# Patient Record
Sex: FEMALE | Race: WHITE | HISPANIC OR LATINO | Employment: OTHER | URBAN - METROPOLITAN AREA
[De-identification: names, ages, dates, MRNs, and addresses within clinical notes are randomized per-mention and may not be internally consistent; named-entity substitution may affect disease eponyms.]

---

## 2017-08-23 ENCOUNTER — HOSPITAL ENCOUNTER (EMERGENCY)
Facility: HOSPITAL | Age: 42
Discharge: HOME | End: 2017-08-24
Attending: EMERGENCY MEDICINE | Admitting: EMERGENCY MEDICINE

## 2017-08-23 DIAGNOSIS — M79.18 MUSCULOSKELETAL PAIN: Primary | ICD-10-CM

## 2017-08-23 DIAGNOSIS — R51.9 OCCIPITAL HEADACHE: ICD-10-CM

## 2017-08-23 PROCEDURE — 96372 THER/PROPH/DIAG INJ SC/IM: CPT

## 2017-08-23 RX ORDER — KETOROLAC TROMETHAMINE 30 MG/ML
30 INJECTION, SOLUTION INTRAMUSCULAR; INTRAVENOUS ONCE
Status: COMPLETED | OUTPATIENT
Start: 2017-08-23 | End: 2017-08-23

## 2017-08-23 RX ORDER — METHOCARBAMOL 500 MG/1
1000 TABLET, FILM COATED ORAL ONCE
Status: COMPLETED | OUTPATIENT
Start: 2017-08-23 | End: 2017-08-23

## 2017-08-23 RX ORDER — NAPROXEN 500 MG/1
500 TABLET ORAL 2 TIMES DAILY PRN
Qty: 20 TABLET | Refills: 0 | Status: SHIPPED | OUTPATIENT
Start: 2017-08-23 | End: 2017-11-02

## 2017-08-23 RX ORDER — METHOCARBAMOL 500 MG/1
1000 TABLET, FILM COATED ORAL EVERY 8 HOURS PRN
Qty: 30 TABLET | Refills: 0 | Status: SHIPPED | OUTPATIENT
Start: 2017-08-23 | End: 2017-11-02

## 2017-08-23 RX ADMIN — KETOROLAC TROMETHAMINE 30 MG: 30 INJECTION, SOLUTION INTRAMUSCULAR at 23:40

## 2017-08-23 RX ADMIN — METHOCARBAMOL 1000 MG: 500 TABLET ORAL at 23:38

## 2017-08-24 VITALS
SYSTOLIC BLOOD PRESSURE: 115 MMHG | TEMPERATURE: 97.9 F | OXYGEN SATURATION: 98 % | DIASTOLIC BLOOD PRESSURE: 62 MMHG | RESPIRATION RATE: 16 BRPM | HEART RATE: 70 BPM

## 2017-08-24 PROCEDURE — 99283 EMERGENCY DEPT VISIT LOW MDM: CPT

## 2017-09-07 ENCOUNTER — GENERIC CONVERSION - ENCOUNTER (OUTPATIENT)
Dept: OTHER | Facility: OTHER | Age: 42
End: 2017-09-07

## 2017-09-07 DIAGNOSIS — R53.83 OTHER FATIGUE: ICD-10-CM

## 2017-09-07 DIAGNOSIS — R51 HEADACHE(784.0): ICD-10-CM

## 2017-09-08 ENCOUNTER — TRANSCRIBE ORDERS (OUTPATIENT)
Dept: LAB | Facility: CLINIC | Age: 42
End: 2017-09-08

## 2017-09-08 ENCOUNTER — APPOINTMENT (OUTPATIENT)
Dept: LAB | Facility: CLINIC | Age: 42
End: 2017-09-08

## 2017-09-08 DIAGNOSIS — R51 HEADACHE(784.0): ICD-10-CM

## 2017-09-08 DIAGNOSIS — R53.83 OTHER FATIGUE: ICD-10-CM

## 2017-09-08 LAB
ALBUMIN SERPL BCP-MCNC: 3.6 G/DL (ref 3.5–5)
ALP SERPL-CCNC: 69 U/L (ref 46–116)
ALT SERPL W P-5'-P-CCNC: 19 U/L (ref 12–78)
ANION GAP SERPL CALCULATED.3IONS-SCNC: 7 MMOL/L (ref 4–13)
AST SERPL W P-5'-P-CCNC: 15 U/L (ref 5–45)
BASOPHILS # BLD AUTO: 0.03 THOUSANDS/ΜL (ref 0–0.1)
BASOPHILS NFR BLD AUTO: 1 % (ref 0–1)
BILIRUB SERPL-MCNC: 0.28 MG/DL (ref 0.2–1)
BUN SERPL-MCNC: 15 MG/DL (ref 5–25)
CALCIUM SERPL-MCNC: 8.7 MG/DL (ref 8.3–10.1)
CHLORIDE SERPL-SCNC: 106 MMOL/L (ref 100–108)
CO2 SERPL-SCNC: 27 MMOL/L (ref 21–32)
CREAT SERPL-MCNC: 0.65 MG/DL (ref 0.6–1.3)
EOSINOPHIL # BLD AUTO: 0.08 THOUSAND/ΜL (ref 0–0.61)
EOSINOPHIL NFR BLD AUTO: 1 % (ref 0–6)
ERYTHROCYTE [DISTWIDTH] IN BLOOD BY AUTOMATED COUNT: 12.7 % (ref 11.6–15.1)
GFR SERPL CREATININE-BSD FRML MDRD: 110 ML/MIN/1.73SQ M
GLUCOSE P FAST SERPL-MCNC: 89 MG/DL (ref 65–99)
HCT VFR BLD AUTO: 37 % (ref 34.8–46.1)
HGB BLD-MCNC: 12.3 G/DL (ref 11.5–15.4)
LYMPHOCYTES # BLD AUTO: 2.43 THOUSANDS/ΜL (ref 0.6–4.47)
LYMPHOCYTES NFR BLD AUTO: 37 % (ref 14–44)
MCH RBC QN AUTO: 32.8 PG (ref 26.8–34.3)
MCHC RBC AUTO-ENTMCNC: 33.2 G/DL (ref 31.4–37.4)
MCV RBC AUTO: 99 FL (ref 82–98)
MONOCYTES # BLD AUTO: 0.38 THOUSAND/ΜL (ref 0.17–1.22)
MONOCYTES NFR BLD AUTO: 6 % (ref 4–12)
NEUTROPHILS # BLD AUTO: 3.63 THOUSANDS/ΜL (ref 1.85–7.62)
NEUTS SEG NFR BLD AUTO: 55 % (ref 43–75)
NRBC BLD AUTO-RTO: 0 /100 WBCS
PLATELET # BLD AUTO: 373 THOUSANDS/UL (ref 149–390)
PMV BLD AUTO: 10.1 FL (ref 8.9–12.7)
POTASSIUM SERPL-SCNC: 3.8 MMOL/L (ref 3.5–5.3)
PROT SERPL-MCNC: 6.7 G/DL (ref 6.4–8.2)
RBC # BLD AUTO: 3.75 MILLION/UL (ref 3.81–5.12)
SODIUM SERPL-SCNC: 140 MMOL/L (ref 136–145)
TSH SERPL DL<=0.05 MIU/L-ACNC: 2.12 UIU/ML (ref 0.36–3.74)
WBC # BLD AUTO: 6.56 THOUSAND/UL (ref 4.31–10.16)

## 2017-09-08 PROCEDURE — 36415 COLL VENOUS BLD VENIPUNCTURE: CPT

## 2017-09-08 PROCEDURE — 80053 COMPREHEN METABOLIC PANEL: CPT

## 2017-09-08 PROCEDURE — 85025 COMPLETE CBC W/AUTO DIFF WBC: CPT

## 2017-09-08 PROCEDURE — 84443 ASSAY THYROID STIM HORMONE: CPT

## 2017-09-11 ENCOUNTER — GENERIC CONVERSION - ENCOUNTER (OUTPATIENT)
Dept: OTHER | Facility: OTHER | Age: 42
End: 2017-09-11

## 2017-10-17 ENCOUNTER — ALLSCRIPTS OFFICE VISIT (OUTPATIENT)
Dept: OTHER | Facility: OTHER | Age: 42
End: 2017-10-17

## 2017-10-17 DIAGNOSIS — Z12.31 ENCOUNTER FOR SCREENING MAMMOGRAM FOR MALIGNANT NEOPLASM OF BREAST: ICD-10-CM

## 2017-10-21 NOTE — PROGRESS NOTES
Assessment  1  Encounter for gynecological examination () (Z01 419)   2  Encounter for screening mammogram for breast cancer (V7 12) (Z12 31)    Discussion/Summary  healthy adult female Currently, she eats an adequate diet and has an adequate exercise regimen  the risks and benefits of cervical cancer screening were discussed cervical cancer screening is current next cervical cancer screening is due  Breast cancer screening: the risks and benefits of breast cancer screening were discussed, self breast exam technique was taught, monthly self breast exam was advised and mammogram has been ordered  Colorectal cancer screening: colorectal cancer screening is not indicated  Osteoporosis screening: bone mineral density testing is not indicated  Advice and education were given regarding nutrition, aerobic exercise, weight bearing exercise, reproductive health, contraception and fall risk reduction  44 y/o  here for annual exam Annual well woman exam   - Last pap 3/2015 resulted NILM and HR HPV negative   Next pap due   - Breast exam WNL   - MMG ordered Maintenance   - Declines flu vaccine today 1 year or sooner as needed  The patient has the current Goals: Annual exam  The patent has the current Barriers: None  Patient is able to Self-Care  Chief Complaint  Patient here for annual check up      History of Present Illness  HPI: 44 y/o  here for annual exam  Last pap 3/2015 resulting NILM and HR HPV negative  Next due 3/2020  Last mammogram 2016 with additional views of right breast that resulted an enlarged lymph node  Due for repeat screening today  LMP 10/2/17 occurs every 28-30 days lasting 5 days  Not sexually active plans to use condoms  GYN HM, Adult Female [facility] The patient is being seen for a gynecology evaluation  The last health maintenance visit was 1 year(s) ago  Social History: Household members include 1 son(s)  She is unmarried   Work status: not currently employed  The patient has never smoked cigarettes  She reports never drinking alcohol  She has never used illicit drugs  General Health: The patient's health since the last visit is described as good  She has regular dental visits  -- She denies vision problems  Vision care includes no need for vision correction-- and-- having regular eye examinations  -- She denies hearing loss  Immunizations status: up to date  Lifestyle:  She does not have a healthy diet  -- She does not have any weight concerns  -- She exercises regularly  She exercises 7 times per week  Exercise includes strength training -- She does not use tobacco -- She denies alcohol use  -- She denies drug use  Reproductive health: the patient is perimenopausal--   she reports no menstrual problems  Menstrual history:  age at menarche was 15  LMP: the last menstrual period was 10/2/17  Recent menstrual periods: bleeding has been normal  The cycles have been regular-- and-- occur approximately every 28-30 days  The duration of her recent periods has been regular-- and-- usually last 5 days  -- she uses no contraception  -- she is not sexually active  -- pregnancy history: G 2P 2,-- 2    Screening: cancer screening reviewed and current  metabolic screening reviewed and current  risk screening reviewed and current  Review of Systems  no pelvic pain,-- no pelvic pressure,-- no vaginal pain,-- no vaginal discharge-- and-- no dysuria  Constitutional: No fever, no chills, feels well, no tiredness, no recent weight gain or loss  ENT: no ear ache, no loss of hearing, no nosebleeds or nasal discharge, no sore throat or hoarseness  Cardiovascular: no complaints of slow or fast heart rate, no chest pain, no palpitations, no leg claudication or lower extremity edema  Respiratory: no complaints of shortness of breath, no wheezing, no dyspnea on exertion, no orthopnea or PND  Breasts: no complaints of breast pain, breast lump or nipple discharge  Gastrointestinal: no complaints of abdominal pain, no constipation, no nausea or diarrhea, no vomiting, no bloody stools  Genitourinary: no complaints of dysuria, no incontinence, no pelvic pain, no dysmenorrhea, no vaginal discharge or abnormal vaginal bleeding  Musculoskeletal: no complaints of arthralgia, no myalgia, no joint swelling or stiffness, no limb pain or swelling  Integumentary: no complaints of skin rash or lesion, no itching or dry skin, no skin wounds  Neurological: no complaints of headache, no confusion, no numbness or tingling, no dizziness or fainting  Over the past 2 weeks, how often have you been bothered by the following problems? 1 ) Little interest or pleasure in doing things? Not at all    2 ) Feeling down, depressed or hopeless? Not at all    3 ) Trouble falling asleep or sleeping too much? Not at all    4 ) Feeling tired or having little energy? Not at all    5 ) Poor appetite or overeating? Not at all    6 ) Feeling bad about yourself, or that you are a failure, or have let yourself or your family down? Not at all    7 ) Trouble concentrating on things, such as reading a newspaper or watching television? Not at all    8 ) Moving or speaking so slowly that other people could have noticed, or the opposite, moving or speaking faster than usual? Not at all    9 ) Thoughts that you would be better off dead or of hurting yourself in some way? Not at all  Score 0     ROS reviewed  OB History  Pregnancy History (Brief):   Prior pregnancies: : 2  Para: 2 (full-term)-- and-- 2 (living)   Delivery type: 1 vaginal,-- 1  section       Active Problems  1  Encounter for gynecological examination (V72 31) (Z01 419)   2  Encounter for screening mammogram for breast cancer (V76 12) (Z12 31)   3  Fatigue (780 79) (R53 83)   4  Headache (784 0) (R51)   5  Influenza vaccine needed (V04 81) (Z23)   6  Nausea with vomiting (787 01) (R11 2)   7   Vitamin D deficiency (268 9) (E55 9)    Past Medical History   · History of Depression (311) (F32 9)   · History of Varicose Veins Of Lower Extremities (454 9)    The active problems and past medical history were reviewed and updated today  Surgical History   · History of  Section   · History of Hernia Repair    The surgical history was reviewed and updated today  Family History  Mother    · Denied: Family history of substance abuse   · No family history of mental disorder  Father    · Denied: Family history of substance abuse   · No family history of mental disorder  Family History    · Family history of Diabetes Mellitus (V18 0)   · Family history of Pure Hypercholesterolemia    The family history was reviewed and updated today  Social History   · Denied: History of Drug use   · Never A Smoker   · Never Drank Alcohol   · No preference on Protestant beliefs  The social history was reviewed and updated today  Current Meds   1  No Reported Medications Recorded    Allergies  1  No Known Drug Allergies    Vitals   Recorded: [id number] 09:43AM   Heart Rate 68   Systolic 026, LUE, Sitting   Diastolic 71, LUE, Sitting   Height 5 ft 3 in   Weight 157 lb    BMI Calculated 27 81   BSA Calculated 1 74   LMP [id number]   Pain Scale 0     Physical Exam    Constitutional   General appearance: No acute distress, well appearing and well nourished  Neck   Neck: Normal, supple, trachea midline, no masses  Thyroid: Normal, no thyromegaly  Pulmonary   Respiratory effort: No increased work of breathing or signs of respiratory distress  Auscultation of lungs: Clear to auscultation  Cardiovascular   Auscultation of heart: Normal rate and rhythm, normal S1 and S2, no murmurs  Peripheral vascular exam: Normal pulses Throughout  Genitourinary   External genitalia: Normal and no lesions appreciated  Vagina: Normal, no lesions or dryness appreciated      Urethral meatus: Normal     Bladder: Normal, soft, non-tender and no prolapse or masses appreciated  Cervix: Normal, no palpable masses  Examination of the cervix revealed no lesions  A Pap smear was not performed  Bimanual exam findings include no cervical motion tenderness  Uterus: Normal, non-tender, not enlarged, and no palpable masses  Adnexa/parametria: Normal, non-tender and no fullness or masses appreciated  Chest   Breasts: Normal and no dimpling or skin changes noted  Abdomen   Abdomen: Normal, non-tender, and no organomegaly noted  Lymphatic   Palpation of lymph nodes in neck, axillae, groin and/or other locations: No lymphadenopathy or masses noted  Skin   Skin and subcutaneous tissue: Normal skin turgor and no rashes  Psychiatric   Orientation to person, place, and time: Normal     Mood and affect: Normal        Results/Data  PHQ-9 Adult Depression Screening [id number] 09:49AM User, [facility]     Test Name Result Flag Reference   PHQ-9 Adult Depression Score 0     Over the last two weeks, how often have you been bothered by any of the following problems? Little interest or pleasure in doing things: Not at all - 0  Feeling down, depressed, or hopeless: Not at all - 0  Trouble falling or staying asleep, or sleeping too much: Not at all - 0  Feeling tired or having little energy: Not at all - 0  Poor appetite or over eating: Not at all - 0  Feeling bad about yourself - or that you are a failure or have let yourself or your family down: Not at all - 0  Trouble concentrating on things, such as reading the newspaper or watching television: Not at all - 0  Moving or speaking so slowly that other people could have noticed   Or the opposite -  being so fidgety or restless that you have been moving around a lot more than usual: Not at all - 0  Thoughts that you would be better off dead, or of hurting yourself in some way: Not at all - 0   PHQ-9 Adult Depression Screening Negative     PHQ-9 Difficulty Level Not difficult at all     PHQ-9 Severity No Depression Attending Note  Collaborating Physician Note: Collaborating Note: I agree with the Advanced Practitioner note   I discussed the case with the Advanced Practitioner and reviewed the AP note      Signatures   Electronically signed by : Candelario Fagan; Oct 17 2017 10:10AM EST                       (Author)    Electronically signed by : STACI Li ; Oct 20 2017 11:06PM EST                       (Author)

## 2017-11-02 ENCOUNTER — HOSPITAL ENCOUNTER (EMERGENCY)
Facility: HOSPITAL | Age: 42
Discharge: HOME | End: 2017-11-02
Attending: EMERGENCY MEDICINE | Admitting: EMERGENCY MEDICINE

## 2017-11-02 VITALS
TEMPERATURE: 97.5 F | OXYGEN SATURATION: 100 % | WEIGHT: 159.5 LBS | HEART RATE: 66 BPM | RESPIRATION RATE: 16 BRPM | DIASTOLIC BLOOD PRESSURE: 51 MMHG | SYSTOLIC BLOOD PRESSURE: 102 MMHG

## 2017-11-02 DIAGNOSIS — R51.9 HEADACHE: ICD-10-CM

## 2017-11-02 DIAGNOSIS — R42 DIZZINESS: Primary | ICD-10-CM

## 2017-11-02 PROCEDURE — 96375 TX/PRO/DX INJ NEW DRUG ADDON: CPT

## 2017-11-02 PROCEDURE — 96374 THER/PROPH/DIAG INJ IV PUSH: CPT

## 2017-11-02 PROCEDURE — 99284 EMERGENCY DEPT VISIT MOD MDM: CPT

## 2017-11-02 PROCEDURE — 96361 HYDRATE IV INFUSION ADD-ON: CPT

## 2017-11-02 RX ORDER — MECLIZINE HYDROCHLORIDE 25 MG/1
25 TABLET ORAL 3 TIMES DAILY PRN
Qty: 10 TABLET | Refills: 0 | Status: SHIPPED | OUTPATIENT
Start: 2017-11-02 | End: 2019-03-11

## 2017-11-02 RX ORDER — MECLIZINE HCL 12.5 MG/1
25 TABLET ORAL ONCE
Status: COMPLETED | OUTPATIENT
Start: 2017-11-02 | End: 2017-11-02

## 2017-11-02 RX ORDER — KETOROLAC TROMETHAMINE 30 MG/ML
15 INJECTION, SOLUTION INTRAMUSCULAR; INTRAVENOUS ONCE
Status: COMPLETED | OUTPATIENT
Start: 2017-11-02 | End: 2017-11-02

## 2017-11-02 RX ORDER — METOCLOPRAMIDE HYDROCHLORIDE 5 MG/ML
10 INJECTION INTRAMUSCULAR; INTRAVENOUS ONCE
Status: COMPLETED | OUTPATIENT
Start: 2017-11-02 | End: 2017-11-02

## 2017-11-02 RX ADMIN — MECLIZINE HCL 25 MG: 12.5 TABLET ORAL at 10:03

## 2017-11-02 RX ADMIN — KETOROLAC TROMETHAMINE 15 MG: 30 INJECTION, SOLUTION INTRAMUSCULAR at 09:59

## 2017-11-02 RX ADMIN — METOCLOPRAMIDE 10 MG: 5 INJECTION, SOLUTION INTRAMUSCULAR; INTRAVENOUS at 10:01

## 2017-11-02 RX ADMIN — SODIUM CHLORIDE 1000 ML: 0.9 INJECTION, SOLUTION INTRAVENOUS at 09:56

## 2017-11-02 NOTE — ED NOTES
ACE*COMM # [id number] used for translation  Pt reports started to feel dizzy since yesterday  Pt reports "got so dizzy she lost her balance and fell " Pt denies hitting head or LOC  Pt reports continued headache and dizziness since then  Pt reports "feels like the room is spinning" with nausea, vomiting and diarrhea   Pt denies CP and SOB      Isaías Lynn RN  11/02/17 1426

## 2017-11-02 NOTE — ED PROVIDER NOTES
History  Chief Complaint   Patient presents with    Dizziness     Started yesterday  Pt states she's had a headache for "many months "  She reports being seen by her PCP multiple times for it and was told "it's from stress "  Headache is diffuse, "It's in my brain "  Denies focal neuro deficits  History provided by:  Patient   used: Yes ([employer] [id number])    Dizziness   Quality:  Room spinning  Onset quality:  Sudden  Duration:  1 day  Timing:  Intermittent  Chronicity:  Recurrent  Context: head movement    Relieved by:  Being still  Worsened by:  Nothing  Ineffective treatments:  None tried  Associated symptoms: headaches ("for many months"), nausea and vomiting    Associated symptoms: no weakness        None       Past Medical History:   Diagnosis Date    No known health problems        Past Surgical History:   Procedure Laterality Date     SECTION      OVARIAN CYST SURGERY         History reviewed  No pertinent family history  I have reviewed and agree with the history as documented  Social History   Substance Use Topics    Smoking status: Never Smoker    Smokeless tobacco: Never Used    Alcohol use No        Review of Systems   Constitutional: Negative for chills and fever  HENT: Negative for congestion, rhinorrhea, sinus pressure and sore throat  Eyes: Negative for photophobia, pain, redness and visual disturbance  Respiratory: Negative for cough  Gastrointestinal: Positive for nausea and vomiting  Musculoskeletal: Negative for myalgias, neck pain and neck stiffness  Skin: Negative for rash  Neurological: Positive for dizziness and headaches ("for many months")  Negative for facial asymmetry, speech difficulty, weakness, light-headedness and numbness  Psychiatric/Behavioral: Negative for confusion  All other systems reviewed and are negative        Physical Exam  ED Triage Vitals [17 0915]   Temperature Pulse Respirations Blood Pressure SpO2   97 5 °F (36 4 °C) 72 16 118/56 95 %      Temp Source Heart Rate Source Patient Position - Orthostatic VS BP Location FiO2 (%)   Oral -- Sitting Right arm --      Pain Score       8           Orthostatic Vital Signs  Vitals:    11/02/17 0915   BP: 118/56   Pulse: 72   Patient Position - Orthostatic VS: Sitting       Physical Exam   Constitutional: She is oriented to person, place, and time  Vital signs are normal  She appears well-developed and well-nourished  Non-toxic appearance  She does not have a sickly appearance  She does not appear ill  No distress  HENT:   Head: Normocephalic and atraumatic  Right Ear: Tympanic membrane normal    Left Ear: Tympanic membrane normal    Nose: Nose normal    Mouth/Throat: Oropharynx is clear and moist and mucous membranes are normal  No oropharyngeal exudate  Eyes: Conjunctivae and EOM are normal  Pupils are equal, round, and reactive to light  Neck: Normal range of motion and full passive range of motion without pain  Neck supple  No neck rigidity  No Brudzinski's sign and no Kernig's sign noted  Cardiovascular: Normal rate, regular rhythm, normal heart sounds and intact distal pulses  Exam reveals no gallop and no friction rub  No murmur heard  Pulmonary/Chest: Effort normal and breath sounds normal  No respiratory distress  She has no wheezes  She has no rales  Abdominal: Soft  Bowel sounds are normal  She exhibits no distension  There is no tenderness  There is no rigidity, no rebound and no guarding  Lymphadenopathy:     She has no cervical adenopathy  Neurological: She is alert and oriented to person, place, and time  She has normal strength  No cranial nerve deficit or sensory deficit  She exhibits normal muscle tone  Gait normal    Skin: Skin is warm and dry  No ecchymosis, no petechiae and no rash noted  She is not diaphoretic  No pallor  Nursing note and vitals reviewed        ED Medications  Medications   sodium chloride 0 9 % bolus 1,000 mL (0 mL Intravenous Stopped 11/2/17 1042)   ketorolac (TORADOL) 30 mg/mL injection 15 mg (15 mg Intravenous Given 11/2/17 0959)   metoclopramide (REGLAN) injection 10 mg (10 mg Intravenous Given 11/2/17 1001)   meclizine (ANTIVERT) tablet 25 mg (25 mg Oral Given 11/2/17 1003)       Diagnostic Studies  Results Reviewed     None                 No orders to display              Procedures  Procedures       Phone Contacts  ED Phone Contact    ED Course  ED Course as of Nov 02 1102   u Nov 02, 2017   1100 Pt states she feels better  MDM  Number of Diagnoses or Management Options  Dizziness:   Headache:   Diagnosis management comments: Headache/Dizziness without focal deficits - Will give symptomatic tx  CritCare Time    Disposition  Final diagnoses:   Dizziness   Headache     Time reflects when diagnosis was documented in both MDM as applicable and the Disposition within this note     Time User Action Codes Description Comment    11/2/2017 11:01 AM Mindy Cagle [R42] Dizziness     11/2/2017 11:01 AM Mindy Cagle [R51] Headache       ED Disposition     ED Disposition Condition Comment    Discharge  [facility] discharge to home/self care  Condition at discharge: Good        Follow-up Information     Follow up With Specialties Details Why Contact Info    Tram Thompson PA-C Family Medicine Schedule an appointment as soon as possible for a visit  [address]          Patient's Medications   Discharge Prescriptions    MECLIZINE (ANTIVERT) 25 MG TABLET    Take 1 tablet by mouth 3 (three) times a day as needed for dizziness       Start Date: 11/2/2017 End Date: --       Order Dose: 25 mg       Quantity: 10 tablet    Refills: 0     No discharge procedures on file      ED Provider  Electronically Signed by           Staci Mcconnell 24, DO  11/02/17 1102

## 2017-11-02 NOTE — DISCHARGE INSTRUCTIONS
Mareos   LO QUE NECESITA SABER:   El mareo es la sensación de falta de equilibrio o inestabilidad  Las causas comunes del mareo son un desequilibrio del líquido del oído interno o la falta de oxígeno en treadwell she  [employer] ser [employer] (durar 3 días o menos) o crónicos (durar más de 3 días)  Usted puede llegar a tener episodios de [employer] que javed de segundos a unas horas  INSTRUCCIONES SOBRE EL DINA HOSPITALARIA:   Regrese a la mary lou de emergencias si:   · Usted tiene dolor de [place] y rigidez en el thao  · Usted tiene escalofríos con temblores y [place]  · Usted vomita amira y [place] vez sin [place]  · Treadwell vómito o deposiciones están pate o negras  · Usted tiene dolor en el pecho, espalda o abdomen  · Usted tiene adormecimiento, sobre todo en la ahri, brazos o piernas  · Usted tiene dificultad para  los brazos o las piernas  · Usted está confundido  Pregúntele a treadwell Anabelle Byes vitaminas y minerales son adecuados para usted  · Usted tiene fiebre  · Ashley síntomas no mejoran con el tratamiento  · Usted tiene preguntas o inquietudes acerca de treadwell condición o cuidado  [place] de treadwell síntomas:   · No maneje   u opere maquinaria pesada cuando esté mareado  · [place] lentamente  cuando esté sentado o acostado  · [employer] suficiente líquidos  Los líquidos ayudan a evitar la deshidratación  Pregunte cuánto líquido debe nahun cada día y cuáles líquidos son los más adecuados para usted  Acuda a ashley consultas de control con treadwell médico según le indicaron  Anote ashley preguntas para que se acuerde de hacerlas linda ashley visitas  © 2017 [address] Information is for End User's use only and may not be sold, redistributed or otherwise used for commercial purposes  All illustrations and images included in CareNotes® are the copyrighted property of A D A M , Inc  or [place]  Esta información es sólo para uso en educación   Treadwell intención no es darle un consejo médico sobre enfermedades o tratamientos  Colsulte con arechiga Khloe Luis Ers farmacéutico antes de seguir cualquier régimen médico para saber si es seguro y efectivo para usted

## 2018-01-13 VITALS
DIASTOLIC BLOOD PRESSURE: 71 MMHG | BODY MASS INDEX: 27.82 KG/M2 | HEIGHT: 63 IN | SYSTOLIC BLOOD PRESSURE: 102 MMHG | HEART RATE: 68 BPM | WEIGHT: 157 LBS

## 2018-01-13 NOTE — PROGRESS NOTES
Assessment    1  Headache (784 0) (R51)    Plan  Influenza vaccine needed    · Stop: Fluarix    Discussion/Summary    Lab results were reviewed with patient and essentially normal with a slightly elevated HgA1C  Recommended that she review her carbohydrate intake and read food labels  She was encouraged to exercise  She was encouraged to resume taking vitamin D3 supplement  She was encouraged to check with her pharmacy regarding the cost of the butalbital, prescribed at the last visit, which is likely considerably lower than the sumatriptan and pharmacy only gave her a total cost of $195 for the two medications that she was unable to afford  She was encouraged to have an eye exam   I spoke with our financial counselor, Orquidea Streeter, who confirmed that the patient does have [facility] with [facility]  He contacted the neurology clinic, in conjunction with the patient, regarding her referral to neurology for her headaches  Possible side effects of new medications were reviewed with the patient/guardian today  The treatment plan was reviewed with the patient/guardian  The patient/guardian understands and agrees with the treatment plan   The patient was counseled regarding instructions for management, risk factor reductions, patient and family education, impressions, risks and benefits of treatment options, importance of compliance with treatment  Chief Complaint  F/U blood work results      History of Present Illness  MynewMD  #[id number] used for visit  Patient presents for review of lab results  Reports that she is still having headaches  She denies nausea/vomiting/dizziness  She states that she is not taking vitamin D3 supplements as recommended in 2015  She states that she is not taking oral contraceptives, which made her feel ill  She was unable to purchase medications prescribed at last visit  She has no medical insurance  She takes Excedrin Migraine, which is not helping much with her headaches  She still has blurry vision and pain in her eyes  She has not had an eye exam as recommended at last visit  She was unable to schedule a visit with [facility] neurology clinic because she was told she had to pay up front and she was unable to do that  She states that she has [facility] with [facility]  Review of Systems    Constitutional: no fever and no chills  Eyes: as noted in HPI    ENT: no complaints of earache, no loss of hearing, no nose bleeds, no nasal discharge, no sore throat, no hoarseness  Cardiovascular: no chest pain and no palpitations  Respiratory: No complaints of shortness of breath, no wheezing, no cough, no SOB on exertion, no orthopnea, no PND  Gastrointestinal: No complaints of abdominal pain, no constipation, no nausea or vomiting, no diarrhea, no bloody stools  Musculoskeletal: No complaints of arthralgias, no myalgias, no joint swelling or stiffness, no limb pain or swelling  Integumentary: No complaints of skin rash or lesions, no itching, no skin wounds, no breast pain or lump  Neurological: as noted in HPI  Psychiatric: Not suicidal, no sleep disturbance, no anxiety or depression, no change in personality, no emotional problems  Endocrine: No complaints of proptosis, no hot flashes, no muscle weakness, no deepening of the voice, no feelings of weakness  Hematologic/Lymphatic: No complaints of swollen glands, no swollen glands in the neck, does not bleed easily, does not bruise easily  Active Problems    1  Headache (784 0) (R51)   2  Vitamin D deficiency (268 9) (E55 9)    Past Medical History    1  History of Depression (311) (F32 9)   2  History of Varicose Veins Of Lower Extremities (454 9)    The active problems and past medical history were reviewed and updated today  Surgical History    1  History of  Section   2  History of Hernia Repair    The surgical history was reviewed and updated today  Family History    1   Denied: Family history of substance abuse   2  No family history of mental disorder    3  Denied: Family history of substance abuse   4  No family history of mental disorder    5  Family history of Diabetes Mellitus (V18 0)   6  Family history of Hypercholesterolemia    The family history was reviewed and updated today  Social History    · Denied: History of Drug use   · Never A Smoker   · Never Drank Alcohol   · No preference on Rastafarian beliefs  The social history was reviewed and updated today  Current Meds   1  Butalbital-APAP-Caffeine -40 MG Oral Capsule; 1-2 tablets every 4-6 hours as   needed for severe headache  Maximum 8 in a week; Therapy: 60VKE5202 to (Last Rx:30Xgo4110)  Requested for: 61RWD6167 Ordered   2  SUMAtriptan Succinate 50 MG Oral Tablet; TAKE 1 TABLET FOR MIGRAINE RELIEF  MAY   REPEAT EVERY 2 HOURS  MAX 200MG/DAY (no mas que cuatro in [facility]); Therapy: 47WON9223 to (Evaluate:38Lcu6785)  Requested for: 87XSD3743; Last   Rx:56Huq4078 Ordered   3  Vitamin D3 5000 UNIT Oral Tablet; TAKE 1 TABLET DAILY AS DIRECTED; Therapy: 80JNM2388 to [phone])  Requested for: 36AED0770; Last   Rx:28Lag2713 Ordered    The medication list was reviewed and updated today  Allergies    1  No Known Drug Allergies    Vitals  Vital Signs [Data Includes: Current Encounter]    Recorded: 39ZFA9377 08:46AM   Temperature 98 1 F   Heart Rate 86   Respiration 18   Systolic 643   Diastolic 64   Height 5 ft 3 in   Weight 156 lb    BMI Calculated 27 63   BSA Calculated 1 74   LMP [id number]     Physical Exam    Constitutional   General appearance: No acute distress, well appearing and well nourished  Eyes   Conjunctiva and lids: Abnormal   Conjunctiva slightly injected bilaterally with no drainage or tearing noted  Pupils and irises: Equal, round and reactive to light  EOMs intact     Ears, Nose, Mouth, and Throat   External inspection of ears and nose: Normal     Otoscopic examination: Tympanic membranes translucent with normal light reflex  Canals patent without erythema  Nasal mucosa, septum, and turbinates: Normal without edema or erythema  Oropharynx: Normal with no erythema, edema, exudate or lesions  Pulmonary   Respiratory effort: No increased work of breathing or signs of respiratory distress  Auscultation of lungs: Clear to auscultation  Cardiovascular   Auscultation of heart: Normal rate and rhythm, normal S1 and S2, without murmurs  Musculoskeletal   Gait and station: Normal     Skin   Skin and subcutaneous tissue: Normal without rashes or lesions  Neurologic   Reflexes: 2+ and symmetric  Sensation: No sensory loss  Psychiatric   Orientation to person, place, and time: Normal     Mood and affect: Normal          Results/Data  Results   (1) CBC/PLT/DIFF 08ZFH2403 10:47AM Winter Pinto Order Number: [id number]    TW Order Number: [id number]     Test Name Result Flag Reference   WBC COUNT 9 42 Thousand/uL  4 31-10 16   RBC COUNT 4 09 Million/uL  3 81-5 12   HEMOGLOBIN 13 3 g/dL  11 5-15 4   HEMATOCRIT 40 1 %  34 8-46  1   MCV 98 fL  82-98   MCH 32 5 pg  26 8-34 3   MCHC 33 2 g/dL  31 4-37 4   RDW 12 4 %  11 6-15 1   MPV 10 2 fL  8 9-12 7   PLATELET COUNT 277 Thousands/uL  149-390   nRBC AUTOMATED 0 /100 WBCs     NEUTROPHILS RELATIVE PERCENT 65 %  43-75   LYMPHOCYTES RELATIVE PERCENT 28 %  14-44   MONOCYTES RELATIVE PERCENT 5 %  4-12   EOSINOPHILS RELATIVE PERCENT 1 %  0-6   BASOPHILS RELATIVE PERCENT 1 %  0-1   NEUTROPHILS ABSOLUTE COUNT 6 22 Thousands/µL  1 85-7 62   LYMPHOCYTES ABSOLUTE COUNT 2 65 Thousands/µL  0 60-4 47   MONOCYTES ABSOLUTE COUNT 0 43 Thousand/µL  0 17-1 22   EOSINOPHILS ABSOLUTE COUNT 0 05 Thousand/µL  0 00-0 61   BASOPHILS ABSOLUTE COUNT 0 05 Thousands/µL  0 00-0 10     (1) COMPREHENSIVE METABOLIC PANEL [id number] 14:15OD Юлия Thomas    Order Number: [id number]      National Kidney Disease Education Program recommendations are as follows:  GFR calculation is accurate only with a steady state creatinine  Chronic Kidney disease less than 60 ml/min/1 73 sq  meters  Kidney failure less than 15 ml/min/1 73 sq  meters  Test Name Result Flag Reference   GLUCOSE,RANDM 92 mg/dL     If the patient is fasting, the ADA then defines impaired fasting glucose as > 100 mg/dL and diabetes as > or equal to 123 mg/dL  SODIUM 138 mmol/L  136-145   POTASSIUM 4 0 mmol/L  3 5-5 3   CHLORIDE 105 mmol/L  100-108   CARBON DIOXIDE 27 mmol/L  21-32   ANION GAP (CALC) 6 mmol/L  4-13   BLOOD UREA NITROGEN 10 mg/dL  5-25   CREATININE 0 58 mg/dL L 0 60-1 30   CALCIUM 8 7 mg/dL  8 3-10 1   BILI, TOTAL 0 44 mg/dL  0 20-1 00   ALK PHOSPHATAS 85 U/L     ALT (SGPT) 25 U/L  12-78   AST(SGOT) 15 U/L  5-45   ALBUMIN 4 0 g/dL  3 5-5 0   TOTAL PROTEIN 6 9 g/dL  6 4-8 2   eGFR Non-African American      >60 0 ml/min/1 73sq m     (1) HEMOGLOBIN A1C [id number] 10:47AM Cheryl MCKEON Order Number: [id number]      5 7-6 4% impaired fasting glucose  >=6 5% diagnosis of diabetes    Falsely low levels are seen in conditions linked to short RBC life span-  hemolytic anemia, and splenomegaly  Falsely elevated levels are seen in situations where there is an increased production of RBC- receipt of erythropoietin or blood transfusions  Adopted from ADA-Clinical Practice Recommendations     Test Name Result Flag Reference   HEMOGLOBIN A1C 5 7 % H 4 0-5 6   EST  AVG   GLUCOSE 117 mg/dl       Signatures   Electronically signed by : Teresa Harmon; Mar  5 2016 12:48PM EST                       (Author)    Electronically signed by : STACI Lilly ; Mar  7 2016  1:08PM EST

## 2018-01-18 NOTE — RESULT NOTES
Verified Results  (1) CBC/PLT/DIFF 60Oxe4975 08:08AM Rishabh Virk Order Number: [id number]     Test Name Result Flag Reference   WBC COUNT 6 56 Thousand/uL  4 31-10 16   RBC COUNT 3 75 Million/uL L 3 81-5 12   HEMOGLOBIN 12 3 g/dL  11 5-15 4   HEMATOCRIT 37 0 %  34 8-46  1   MCV 99 fL H 82-98   MCH 32 8 pg  26 8-34 3   MCHC 33 2 g/dL  31 4-37 4   RDW 12 7 %  11 6-15 1   MPV 10 1 fL  8 9-12 7   PLATELET COUNT 744 Thousands/uL  149-390   nRBC AUTOMATED 0 /100 WBCs     NEUTROPHILS RELATIVE PERCENT 55 %  43-75   LYMPHOCYTES RELATIVE PERCENT 37 %  14-44   MONOCYTES RELATIVE PERCENT 6 %  4-12   EOSINOPHILS RELATIVE PERCENT 1 %  0-6   BASOPHILS RELATIVE PERCENT 1 %  0-1   NEUTROPHILS ABSOLUTE COUNT 3 63 Thousands/? ??L  1 85-7 62   LYMPHOCYTES ABSOLUTE COUNT 2 43 Thousands/? ??L  0 60-4 47   MONOCYTES ABSOLUTE COUNT 0 38 Thousand/? ??L  0 17-1 22   EOSINOPHILS ABSOLUTE COUNT 0 08 Thousand/? ??L  0 00-0 61   BASOPHILS ABSOLUTE COUNT 0 03 Thousands/? ??L  0 00-0 10     (1) COMPREHENSIVE METABOLIC PANEL 63WOV9455 55:70RG Rishabh Virk Order Number: [id number]     Test Name Result Flag Reference   SODIUM 140 mmol/L  136-145   POTASSIUM 3 8 mmol/L  3 5-5 3   CHLORIDE 106 mmol/L  100-108   CARBON DIOXIDE 27 mmol/L  21-32   ANION GAP (CALC) 7 mmol/L  4-13   BLOOD UREA NITROGEN 15 mg/dL  5-25   CREATININE 0 65 mg/dL  0 60-1 30   Standardized to IDMS reference method   CALCIUM 8 7 mg/dL  8 3-10 1   BILI, TOTAL 0 28 mg/dL  0 20-1 00   ALK PHOSPHATAS 69 U/L     ALT (SGPT) 19 U/L  12-78   Specimen collection should occur prior to Sulfasalazine and/or Sulfapyridine administration due to the potential for falsely depressed results  AST(SGOT) 15 U/L  5-45   Specimen collection should occur prior to Sulfasalazine administration due to the potential for falsely depressed results     ALBUMIN 3 6 g/dL  3 5-5 0   TOTAL PROTEIN 6 7 g/dL  6 4-8 2   eGFR 110 ml/min/1 73sq m     National Kidney Disease Education Program recommendations are as follows:  GFR calculation is accurate only with a steady state creatinine  Chronic Kidney disease less than 60 ml/min/1 73 sq  meters  Kidney failure less than 15 ml/min/1 73 sq  meters  GLUCOSE FASTING 89 mg/dL  65-99   Specimen collection should occur prior to Sulfasalazine administration due to the potential for falsely depressed results  Specimen collection should occur prior to Sulfapyridine administration due to the potential for falsely elevated results  (1) TSH WITH FT4 REFLEX [id number] 08:08AM [facility] Order Number: [id number]     Test Name Result Flag Reference   TSH 2 120 uIU/mL  0 358-3 740   Patients undergoing fluorescein dye angiography may retain small amounts of fluorescein in the body for 48-72 hours post procedure  Samples containing fluorescein can produce falsely depressed TSH values  If the patient had this procedure,a specimen should be resubmitted post fluorescein clearance            The recommended reference ranges for TSH during pregnancy are as follows:  First trimester 0 1 to 2 5 uIU/mL  Second trimester  0 2 to 3 0 uIU/mL  Third trimester 0 3 to 3 0 uIU/m

## 2018-01-22 VITALS
BODY MASS INDEX: 27.82 KG/M2 | TEMPERATURE: 97.5 F | SYSTOLIC BLOOD PRESSURE: 110 MMHG | RESPIRATION RATE: 18 BRPM | OXYGEN SATURATION: 98 % | WEIGHT: 157 LBS | DIASTOLIC BLOOD PRESSURE: 80 MMHG | HEART RATE: 76 BPM | HEIGHT: 63 IN

## 2018-05-19 PROBLEM — R53.83 FATIGUE: Status: ACTIVE | Noted: 2017-09-07

## 2018-05-21 ENCOUNTER — OFFICE VISIT (OUTPATIENT)
Dept: FAMILY MEDICINE CLINIC | Facility: CLINIC | Age: 43
End: 2018-05-21

## 2018-05-21 VITALS
HEIGHT: 61 IN | OXYGEN SATURATION: 98 % | TEMPERATURE: 98.4 F | DIASTOLIC BLOOD PRESSURE: 70 MMHG | RESPIRATION RATE: 18 BRPM | BODY MASS INDEX: 30.4 KG/M2 | SYSTOLIC BLOOD PRESSURE: 110 MMHG | WEIGHT: 161 LBS | HEART RATE: 88 BPM

## 2018-05-21 DIAGNOSIS — R53.83 FATIGUE, UNSPECIFIED TYPE: ICD-10-CM

## 2018-05-21 DIAGNOSIS — M54.2 NECK PAIN: Primary | ICD-10-CM

## 2018-05-21 DIAGNOSIS — R42 VERTIGO: ICD-10-CM

## 2018-05-21 DIAGNOSIS — R51.9 INTRACTABLE HEADACHE, UNSPECIFIED CHRONICITY PATTERN, UNSPECIFIED HEADACHE TYPE: ICD-10-CM

## 2018-05-21 PROCEDURE — 99214 OFFICE O/P EST MOD 30 MIN: CPT | Performed by: PHYSICIAN ASSISTANT

## 2018-05-21 RX ORDER — NAPROXEN 500 MG/1
500 TABLET ORAL 2 TIMES DAILY WITH MEALS
Qty: 60 TABLET | Refills: 1 | Status: SHIPPED | OUTPATIENT
Start: 2018-05-21 | End: 2019-03-11 | Stop reason: SDUPTHER

## 2018-05-21 RX ORDER — CYCLOBENZAPRINE HCL 10 MG
10 TABLET ORAL 3 TIMES DAILY PRN
Qty: 30 TABLET | Refills: 1 | Status: SHIPPED | OUTPATIENT
Start: 2018-05-21 | End: 2019-06-10

## 2018-05-21 NOTE — PROGRESS NOTES
Assessment/Plan:    Patient Instructions   Heat to the cervical spine 3 times daily as needed for 10 min  Take naproxen with food, no alcohol  Avoid cyclobenzaprine of working or driving because the drowsy side effects  Proceed with physical therapy  The importance of doing the exercises at home has also been discussed  Labs from September have been reviewed again with her  CBC, TSH and CMP are normal   Follow-up if there is no improvement with physical therapy in 6-8 weeks or if any symptoms increase  M*Modal software was used to dictate this note  It may contain errors with dictating incorrect words/spelling  Please contact provider directly for any questions  Diagnoses and all orders for this visit:    Neck pain  -     Ambulatory referral to Physical Therapy; Future    Intractable headache, unspecified chronicity pattern, unspecified headache type  -     Ambulatory referral to Physical Therapy; Future    Vertigo  -     Ambulatory referral to Physical Therapy; Future    Fatigue, unspecified type          Subjective:      Patient ID: Giancarlo Barnes is a 43 y o  female  Patient presents today for evaluation of neck pain, pain in the back of her head, intermittent dizziness and fatigue which is been ongoing for a while  She was here in September with complaints of fatigue and headaches and had blood work done at that time including her thyroid, blood count and CMP which was normal at that time  She was not sure what blood work was completed  She gets intermittent dizziness  She states that she was cleaning a when she went to stand up after bending forward she felt like the room was spinning  She states she has been sleeping well because of her neck pain  She does get intermittent tingling in all of her fingers of both hands  She has tried Aleve over-the-counter with minimal relief          The following portions of the patient's history were reviewed and updated as appropriate:   She  has a past medical history of Depression; No known health problems; and Varicose veins of both lower extremities  She   Patient Active Problem List    Diagnosis Date Noted    Neck pain 2018    Vertigo 2018    Fatigue 2017    Vitamin D deficiency 09/15/2014    Headache 2013     She  has a past surgical history that includes  section; Ovarian cyst surgery; and Hernia repair  Her family history includes Diabetes in her other; Hyperlipidemia in her other  She  reports that she has never smoked  She has never used smokeless tobacco  She reports that she does not drink alcohol or use drugs  Current Outpatient Prescriptions   Medication Sig Dispense Refill    meclizine (ANTIVERT) 25 mg tablet Take 1 tablet by mouth 3 (three) times a day as needed for dizziness 10 tablet 0     No current facility-administered medications for this visit  Current Outpatient Prescriptions on File Prior to Visit   Medication Sig    meclizine (ANTIVERT) 25 mg tablet Take 1 tablet by mouth 3 (three) times a day as needed for dizziness     No current facility-administered medications on file prior to visit  She has No Known Allergies       Review of Systems   Constitutional: Positive for fatigue  Respiratory: Negative  Cardiovascular: Negative  Gastrointestinal: Negative  Musculoskeletal:        As stated in HPI   Neurological:        As stated in HPI         Objective:      /70   Pulse 88   Temp 98 4 °F (36 9 °C) (Tympanic)   Resp 18   Ht 5' 1" (1 549 m)   Wt 73 kg (161 lb)   LMP 2018   SpO2 98%   BMI 30 42 kg/m²          Physical Exam   Constitutional: She appears well-developed and well-nourished  No distress  HENT:   Head: Normocephalic and atraumatic  Right Ear: External ear normal    Left Ear: External ear normal    Mouth/Throat: Oropharynx is clear and moist  No oropharyngeal exudate  Neck: Neck supple  No thyromegaly present     Cardiovascular: Normal rate, regular rhythm, normal heart sounds and intact distal pulses  No murmur heard  Pulmonary/Chest: Effort normal and breath sounds normal  No respiratory distress  She has no wheezes  She has no rales  Abdominal: Soft  Bowel sounds are normal  There is no tenderness  Musculoskeletal:   Cervical spine:  She does have diffuse tenderness over the spine and trap region bilaterally  She does have reduced rotation especially to the left  Strength of the upper extremities is 5+/5 and equal bilaterally  Distal neurovascular status is intact  Lymphadenopathy:     She has no cervical adenopathy  Neurological: She is alert  Skin: Skin is warm  Psychiatric: She has a normal mood and affect   Her behavior is normal  Judgment and thought content normal

## 2018-05-21 NOTE — PATIENT INSTRUCTIONS
Heat to the cervical spine 3 times daily as needed for 10 min  Take naproxen with food, no alcohol  Avoid cyclobenzaprine of working or driving because the drowsy side effects  Proceed with physical therapy  The importance of doing the exercises at home has also been discussed  Labs from September have been reviewed again with her  CBC, TSH and CMP are normal   Follow-up if there is no improvement with physical therapy in 6-8 weeks or if any symptoms increase

## 2018-10-02 ENCOUNTER — HOSPITAL ENCOUNTER (EMERGENCY)
Facility: HOSPITAL | Age: 43
Discharge: HOME | End: 2018-10-02
Admitting: EMERGENCY MEDICINE

## 2018-10-02 VITALS
DIASTOLIC BLOOD PRESSURE: 61 MMHG | RESPIRATION RATE: 20 BRPM | OXYGEN SATURATION: 95 % | TEMPERATURE: 98.4 F | HEART RATE: 88 BPM | BODY MASS INDEX: 30.23 KG/M2 | WEIGHT: 160 LBS | SYSTOLIC BLOOD PRESSURE: 125 MMHG

## 2018-10-02 DIAGNOSIS — R09.82 PND (POST-NASAL DRIP): ICD-10-CM

## 2018-10-02 DIAGNOSIS — R05.9 COUGH: Primary | ICD-10-CM

## 2018-10-02 PROCEDURE — 99284 EMERGENCY DEPT VISIT MOD MDM: CPT

## 2018-10-02 PROCEDURE — 93005 ELECTROCARDIOGRAM TRACING: CPT

## 2018-10-03 NOTE — DISCHARGE INSTRUCTIONS
You do not appear to have an infection  Your symptoms appear to be from the weather changes  You have post nasal drip that is causing the scratchy throat  You have muscle pain from coughing  You are to take the claritin tablets and continue robitussin as needed  Use a throat drop  Increase water intake  Follow up with your PCP  Tos aguda   LO QUE NECESITA SABER:   Sherita Gimenez tos aguda puede durar hasta 3 semanas  Las causas comunes de amira tos aguda incluyen un resfriado, alergias o amira infección pulmonar  INSTRUCCIONES SOBRE EL DINA HOSPITALARIA:   Regrese a la mary lou de emergencias si:   · Tiene dificultad para respirar o le falta el aire  · Usted tose she o nota she en arechiga mucosidad  · Se desmaya, se siente débil o mareado  · Le duele el pecho cuando respira hondo o tose  · Tiene respiración silbante  Pregúntele a arechiga Cherylann Bimler vitaminas y minerales son adecuados para usted  · Usted tiene fiebre  · La tos dura más de 4 semanas  · Lea síntomas no mejoran con el tratamiento  · Usted tiene preguntas o inquietudes acerca de arechiga condición o cuidado  Medicamentos:   · Medicamentos,  para detener la tos, disminuir la inflamación de las vías respiratorias o ayudar a abrirlas  Los medicamentos también pueden despejar las vías respiratorias  Pregunte a arechiga médico qué medicamentos de venta jeremías puede nahun  Si tiene amira infección causada por bacterias, es posible que necesite antibióticos  · [place] lea medicamentos kandace se le haya indicado  Consulte con arechiga médico si usted christine que arechiga medicamento no le está ayudando o si presenta efectos secundarios  Infórmele si es alérgico a cualquier medicamento  Mantenga amira lista actualizada de los [place], las vitaminas y los productos herbales que javy  Incluya los siguientes datos de los medicamentos: cantidad, frecuencia y motivo de administración  Traiga con usted la lista o los envases de la píldoras a lea citas de seguimiento   Lleve la lista de los medicamentos con usted en cornelio de amira emergencia  El manejo de treadwell síntomas:   · No fume y permanezca lejos de otras personas que fuman  La nicotina y otros químicos contenidos en los cigarrillos y puros pueden causar daño pulmonar y empeorar treadwell tos  Pida información a treadwell médico si usted actualmente fuma y necesita ayuda para dejar de fumar  Los cigarrillos electrónicos o tabaco sin humo todavía contienen nicotina  Consulte con treadwell médico antes de [employer]  · Mango líquidos adicionales kandace se le indique  Los líquidos le ayudarán a aflojar y disolver la mucosidad para que la pueda expectorar  Los líquidos ayudarán a evitar la deshidratación  Los mejores líquidos para nahun son el agua, el jugo y el caldo  No tome líquidos que contienen cafeína  La cafeína puede aumentar el riesgo de deshidratación  Pregúntele a treadwell médico cuál es la cantidad de líquido que necesita ingerir a diario  · Repose según le indicaron  No realice actividades que empeoren la tos, kandace el ejercicio físico      · Use un humidificador o vaporizador  Use un humidificador de seema frío o un vaporizador para elevar la humedad en treadwell casa  [place] podría ayudarle a respirar más fácilmente y al mismo tiempo disminuir la tos  · Ingiera de 2 a 5 ml de [employer] al día  La miel puede ayudar a diluir los mocos y [employer] tos  · Utilice gotas o pastillas para la tos  Estas pueden ayudar a disminuir la irritación de la garganta y la tos  Acuda a ashley consultas de control con treadwell médico según le indicaron  Anote ashley preguntas para que se acuerde de hacerlas linda ashley visitas  © 2017 [facility] Information is for End User's use only and may not be sold, redistributed or otherwise used for commercial purposes  All illustrations and images included in CareNotes® are the copyrighted property of A D A M , Inc  or Urbano Travis  Esta información es sólo para uso en educación   Treadwell intención no es darle un consejo médico sobre enfermedades o tratamientos  Colsulte con arechiga Galindo Garg farmacéutico antes de seguir cualquier régimen médico para saber si es seguro y efectivo para usted  Goteo retronasal   CUIDADO AMBULATORIO:   El goteo retronasal  es amira condición que causa la acumulación de amira gran cantidad de moco en la garganta o la nariz  También se denomina síndrome de tos asociado a patología de la vía aérea superior debido a que la mucosidad provoca tos repetida  Usted puede tener dolor de garganta o hinchazón de los tejidos de la garganta  Podría sentir kandace que tiene amira protuberancia en la garganta  También puede sentir la necesidad de despejar la garganta a menudo  Comuníquese con arechiga médico si:   · Usted tiene dificultad para tragar debido a la mucosidad  · Usted tiene síntomas nuevos o estos empeoran, incluso después del [place]  · Usted tiene signos de [address], kandace mucosidad CHRISTINE o [place], o [place]  · Usted tiene preguntas o inquietudes acerca de arechiga condición o cuidado  El tratamiento  podría incluir cualquiera de los siguientes:  · Medicamentos,  pueden ser administrados para diluir la mucosidad  Puede que necesite tragar el medicamento o [employer] un dispositivo para limpiar los senos paranasales con un líquido que se arroja a chorros en la nariz  También podría necesitar aerosoles nasales para mantener la humedad de los tejidos en la nariz  Los medicamentos también pueden aliviar la congestión  Los medicamentos para la alergia pueden ayudar si los síntomas son provocados por alergias estacionales, kandace la fiebre del heno  Usted podría necesitar medicamentos para ayudar a controlar la enfermedad por reflujo gastroesofágico     · Antibióticos  podrían ser necesarios para tratar amira infección bacteriana  Controle el goteo retronasal:   · Use un humidificador o vaporizador    Use un humidificador de seema frío o un vaporizador para elevar la humedad en treadwell casa  [place] podría ayudarle a respirar con Carine Marilee  · [employer] líquidos kandace se le indique  Los líquidos ayudan a mantener humectadas ashley vías respiratorias y ayudarle a eliminar las flemas por medio de la tos  Pregunte cuánto líquido debe nahun cada día y cuáles líquidos son los más adecuados para usted  · Evite el aire frío y el aire seco y caliente  El aire frío o el aire seco pueden provocar goteo retronasal  Trate de permanecer dentro en días fríos, o de [employer] boca [place]  No permanezca mucho tiempo en áreas con aire seco y caliente  · No fume y evite el humo de [place]  La nicotina y otros químicos en los cigarrillos y cigarros pueden irritar la garganta y empeorar la tos  Pida información a teradwell médico si usted actualmente fuma y necesita ayuda para dejar de fumar  Los cigarrillos electrónicos o tabaco sin humo todavía contienen nicotina  Consulte con treadwell médico antes de [employer]  Acuda a ashley consultas de control con treadwell médico según le indicaron  Anote ashley preguntas para que se acuerde de hacerlas linda ashley visitas  © 2017 [address] [facility] Information is for End User's use only and may not be sold, redistributed or otherwise used for commercial purposes  All illustrations and images included in CareNotes® are the copyrighted property of A D A M , Inc  or Urbano Travis  Esta información es sólo para uso en educación  Treadwell intención no es darle un consejo médico sobre enfermedades o tratamientos  Colsulte con treadwell Gaylyn Harsh farmacéutico antes de seguir cualquier régimen médico para saber si es seguro y efectivo para usted

## 2018-10-03 NOTE — ED PROVIDER NOTES
History  Chief Complaint   Patient presents with    Chest Pain     patient c/o chest pain and sore throat that started a couple of days ago    Sore Throat     This is a 37year old female who states has had a scratchy throat, cough and chest and abdominal muscle soreness for the last 2 weeks  She states she has been taking tussin for 2 days and tylenol x 1 dose  She states that her mother came from [place] and brought her some amoxicillin, she took 1 dose and stopped  She denies fevers, chills, n/v/d  History provided by:  Patient and medical records   used: No    Chest Pain   Chest pain location: central chest   Pain quality comment:  Soreness   Pain radiates to:  Does not radiate  Pain radiates to the back: no    Pain severity:  Mild  Onset quality:  Gradual  Duration:  1 week  Progression:  Waxing and waning  Chronicity:  New  Context comment:  Coughing   Relieved by:  Nothing  Ineffective treatments: tylenol   Associated symptoms: cough    Sore Throat   Associated symptoms: chest pain and cough        Prior to Admission Medications   Prescriptions Last Dose Informant Patient Reported? Taking?    cyclobenzaprine (FLEXERIL) 10 mg tablet   No No   Sig: Take 1 tablet (10 mg total) by mouth 3 (three) times a day as needed for muscle spasms   meclizine (ANTIVERT) 25 mg tablet   No No   Sig: Take 1 tablet by mouth 3 (three) times a day as needed for dizziness   naproxen (NAPROSYN) 500 mg tablet   No No   Sig: Take 1 tablet (500 mg total) by mouth 2 (two) times a day with meals      Facility-Administered Medications: None       Past Medical History:   Diagnosis Date    Depression     No known health problems     Varicose veins of both lower extremities        Past Surgical History:   Procedure Laterality Date     SECTION      HERNIA REPAIR      OVARIAN CYST SURGERY         Family History   Problem Relation Age of Onset    Diabetes Other     Hyperlipidemia Other pure     I have reviewed and agree with the history as documented  Social History   Substance Use Topics    Smoking status: Never Smoker    Smokeless tobacco: Never Used    Alcohol use No        Review of Systems   Constitutional: Negative  HENT: Positive for sore throat  Eyes: Negative  Respiratory: Positive for cough  Cardiovascular: Positive for chest pain  Gastrointestinal: Negative  Physical Exam  Physical Exam   Constitutional: She is oriented to person, place, and time  She appears well-developed and well-nourished  No distress  HENT:   Head: Normocephalic and atraumatic  Right Ear: External ear normal    Left Ear: External ear normal    Nose: Nose normal    Posterior oropharyngeal + PND with mild redness  Pt has chronic throat clearing cough     Eyes: Pupils are equal, round, and reactive to light  EOM are normal    Neck: Normal range of motion  Neck supple  Cardiovascular: Normal rate, regular rhythm and normal heart sounds  Pulmonary/Chest: Effort normal and breath sounds normal  No respiratory distress  She has no wheezes  She has no rales  She exhibits no tenderness  Abdominal: Soft  Bowel sounds are normal    Musculoskeletal: Normal range of motion  Neurological: She is alert and oriented to person, place, and time  Skin: Skin is warm and dry  Capillary refill takes less than 2 seconds  She is not diaphoretic  Psychiatric: She has a normal mood and affect  Her behavior is normal  Judgment and thought content normal    Nursing note and vitals reviewed        Vital Signs  ED Triage Vitals [10/02/18 2043]   Temperature Pulse Respirations Blood Pressure SpO2   98 4 °F (36 9 °C) 88 20 125/61 95 %      Temp Source Heart Rate Source Patient Position - Orthostatic VS BP Location FiO2 (%)   Oral Monitor Sitting Right arm --      Pain Score       --           Vitals:    10/02/18 2043   BP: 125/61   Pulse: 88   Patient Position - Orthostatic VS: Sitting       Visual Acuity      ED Medications  Medications - No data to display    Diagnostic Studies  Results Reviewed     None                 No orders to display              Procedures  Procedures       Phone Contacts  ED Phone Contact    ED Course                               MDM  Number of Diagnoses or Management Options  Cough:   PND (post-nasal drip):   Diagnosis management comments: PND cough    Continue robitussin, take claritin    Pt verbalizes understanding of d/c instructions and follow up        Amount and/or Complexity of Data Reviewed  Review and summarize past medical records: yes      CritCare Time    Disposition  Final diagnoses:   Cough   PND (post-nasal drip)     Time reflects when diagnosis was documented in both MDM as applicable and the Disposition within this note     Time User Action Codes Description Comment    10/2/2018  9:09 PM Gela Patter Add [R05] Cough     10/2/2018  9:09 PM Gela Keyter Add [R09 82] PND (post-nasal drip)       ED Disposition     ED Disposition Condition Comment    Discharge  [facility] discharge to home/self care      Condition at discharge: Good        Follow-up Information     Follow up With Specialties Details Why Contact Info Additional Information    Tram Thompson PA-C Family Medicine, Physician Assistant Schedule an appointment as soon as possible for a visit in 2 days  [facility]  [facility] [address] [place] [address] Emergency Department Emergency Medicine  If symptoms worsen [facility]  [phone] [address] [place] [address] ED, [address] [place], [place], [address]          Discharge Medication List as of 10/2/2018  9:11 PM      CONTINUE these medications which have NOT CHANGED    Details   cyclobenzaprine (FLEXERIL) 10 mg tablet Take 1 tablet (10 mg total) by mouth 3 (three) times a day as needed for muscle spasms, Starting Mon 5/21/2018, Normal      meclizine (ANTIVERT) 25 mg tablet Take 1 tablet by mouth 3 (three) times a day as needed for dizziness, Starting Thu 11/2/2017, Print      naproxen (NAPROSYN) 500 mg tablet Take 1 tablet (500 mg total) by mouth 2 (two) times a day with meals, Starting Mon 5/21/2018, Normal           No discharge procedures on file      ED Provider  Electronically Signed by           Clinton Brady  10/02/18 8339

## 2018-10-04 LAB
ATRIAL RATE: 81 BPM
P AXIS: 36 DEGREES
PR INTERVAL: 138 MS
QRS AXIS: -6 DEGREES
QRSD INTERVAL: 100 MS
QT INTERVAL: 388 MS
QTC INTERVAL: 450 MS
T WAVE AXIS: 31 DEGREES
VENTRICULAR RATE: 81 BPM

## 2018-10-04 PROCEDURE — 93010 ELECTROCARDIOGRAM REPORT: CPT | Performed by: INTERNAL MEDICINE

## 2019-03-11 ENCOUNTER — OFFICE VISIT (OUTPATIENT)
Dept: FAMILY MEDICINE CLINIC | Facility: CLINIC | Age: 44
End: 2019-03-11

## 2019-03-11 VITALS
OXYGEN SATURATION: 95 % | SYSTOLIC BLOOD PRESSURE: 102 MMHG | HEIGHT: 61 IN | RESPIRATION RATE: 18 BRPM | HEART RATE: 98 BPM | TEMPERATURE: 98.6 F | BODY MASS INDEX: 29.45 KG/M2 | DIASTOLIC BLOOD PRESSURE: 64 MMHG | WEIGHT: 156 LBS

## 2019-03-11 DIAGNOSIS — J01.20 ACUTE NON-RECURRENT ETHMOIDAL SINUSITIS: Primary | ICD-10-CM

## 2019-03-11 DIAGNOSIS — M54.2 NECK PAIN: ICD-10-CM

## 2019-03-11 DIAGNOSIS — R21 RASH: ICD-10-CM

## 2019-03-11 PROCEDURE — 99214 OFFICE O/P EST MOD 30 MIN: CPT | Performed by: PHYSICIAN ASSISTANT

## 2019-03-11 RX ORDER — AMOXICILLIN 500 MG/1
1000 CAPSULE ORAL EVERY 8 HOURS SCHEDULED
Qty: 42 CAPSULE | Refills: 0 | Status: SHIPPED | OUTPATIENT
Start: 2019-03-11 | End: 2019-03-18

## 2019-03-11 RX ORDER — NAPROXEN 500 MG/1
500 TABLET ORAL 2 TIMES DAILY WITH MEALS
Qty: 60 TABLET | Refills: 1 | Status: SHIPPED | OUTPATIENT
Start: 2019-03-11 | End: 2019-06-10

## 2019-03-11 RX ORDER — PREDNISONE 10 MG/1
20 TABLET ORAL DAILY
Qty: 10 TABLET | Refills: 0 | Status: SHIPPED | OUTPATIENT
Start: 2019-03-11 | End: 2019-06-10

## 2019-03-11 NOTE — PATIENT INSTRUCTIONS
Sinusitis   LO QUE NECESITA SABER:   ¿Qué es la sinusitis? La sinusitis es la inflamación o infección de los senos paranasales  La mayoría de las veces es a causa de un virus  La sinusitis aguda podría durar hasta 12 semanas  La sinusitis crónica dura más de 12 semanas  La sinusitis recurrente significa que la padece 4 o más veces en 1 año  ¿Qué aumenta mi riesgo de tener sinusitis? · Las condiciones de enedina, kandace las infecciones de las vías respiratorias superiores, las [place], el asma o la fibrosis quística    · Las infecciones dentales o procedimientos, tales kandace las infecciones de encías, caries o un conducto radicular    · Fumar    · Las estructuras anormales de los senos paranasales, kandace bultos en la nariz, inflamación de las amígdalas o desviación del tabique nasal    · Un sistema inmunológico débil debido a las enfermedades kandace la diabetes o el VIH  ¿Cuáles son los signos y síntomas de la sinusitis? · Vidhi Wellborn o escalofríos    · Dolor, presión, enrojecimiento o inflamación alrededor de la frente, las mejillas o los ojos    · Secreción espesa de color amarillo o verenice que sale de la nariz    · Sensibilidad al tocarse el hernando encima de los senos paranasales    · Tos seca que ocurre mayormente por la noche o al [employer]    · Dolor de [place] y en el hernando que empeora al inclinarse hacia adelante    · Dolor en los dientes o al masticar  ¿Cómo se diagnostica la sinusitis? Arechiga médico lo examinará y le hará preguntas acerca de lea síntomas  Usará un espéculo nasal para revisar dentro de arechiga nariz  Un espéculo es un instrumento pequeño que se [place] para abrir las fosas nasales  Es posible que [place] de mucosidad de arechiga nariz muestre qué germen está provocando arechiga infección  ¿Cómo se trata la sinusitis? Lea síntomas podrían desaparecer por [employer]  Arechiga médico puede recomendar amira espera atenta hasta 10 días antes de iniciar el tratamiento con antibióticos   Es posible que usted necesite alguno de los siguientes:  · Acetaminofeno:  more el dolor y baja la fiebre  Está disponible sin receta médica  Pregunte la cantidad y la frecuencia con que debe tomarlos  [address]  Cindi las etiquetas de todos los demás medicamentos que esté usando para saber si también contienen acetaminofén, o pregunte a arechiga médico o farmacéutico  El acetaminofén puede causar daño en el hígado cuando no se javy de forma correcta  No use más de 4 gramos (4000 miligramos) en total de acetaminofeno en un día  · AINEs (Analgésicos antiinflamatorios no esteroides) kandace el ibuprofeno, ayudan a disminuir la inflamación, el dolor y la [place]  [place]ristin medicamento esta disponible con o sin amira receta médica  Los AINEs pueden causar sangrado estomacal o problemas renales en ciertas personas  Si usted javy un medicamento anticoagulante, siempre pregúntele a arechiga médico si los ZEINAB son seguros para usted  Siempre cindi la etiqueta de cristin medicamento y [place] instrucciones  · Los aerosoles nasales con esteroides  podrían ayudar a disminuir la inflamación de arechiga nariz y senos paranasales  · Los descongestionantes  ayudan a reducir la inflamación y a drenar la mucosidad en la nariz y los senos paranasales  Los descongestionantes podrían ayudarle a respirar más fácilmente  · Los antihistamínicos  ayudan a secar la mucosidad en arechiga [place]alonda [place] and [place] y a aliviar los estornudos  · Antibióticos  ayudan a tratar o prevenir infecciones bacteriales  ¿Cómo puedo controlar los síntomas? · Enjuague ashley senos paranasales  Use un aparato para enjuagar ashley fosas nasales con amira solución salina (agua salada) o agua destilada  No use agua de la llave  Campus ayudará a diluir la mucosidad en la nariz eliminando el polen y la suciedad  También ayudará a reducir la inflamación para que usted pueda respirar normalmente  Pregunte a arechiga médico con qué frecuencia hacerlo  · Respire vapor  [place] agua en un sartén hasta que salga vapor   Juanice Ahr cuenco y ag amira carpa con amira toalla maya  Respire profundamente por aproximadamente 20 minutos  Tenga cuidado de no acercarse demasiado al vapor o de quemarse  Ag esto 3 veces al día  Usted también puede respirar profundamente mientras javy amira ducha caliente  · Duerma con la Bari Agreste  Coloque amira almohada adicional debajo de arechiga mikey antes de dormir para ayudar a drenar lea senos paranasales  · [address] se le haya indicado  Pregunte a arechiga médico sobre la cantidad de líquido que necesita nahun todos los días y cuáles le recomienda  Los líquidos van a diluir la mucosidad en arechiga Jena Consuelo y [facility] a drenarla  Evite las bebidas que contienen alcohol o cafeína  · No fume y evite el humo de [place]  La nicotina y otros químicos en los cigarrillos y cigarros pueden empeorar lea síntomas  Pida información a arechiga médico si usted actualmente fuma y necesita ayuda para dejar de fumar  Los cigarrillos electrónicos o tabaco sin humo todavía contienen nicotina  Consulte con arechiga médico antes de [employer]  ¿Cómo puedo ayudar a evitar el contagio de los gérmenes que provocan la sinusitis? Lávese las isrrael frecuentemente con agua y Helena Dingwall  [employer] las isrrael después de usar el baño, cambiarle el pañal a un lloyd o estornudar  Lávese las isrrael antes de comer o preparar alimentos  ¿Cuándo palma buscar atención inmediata? · Arechiga calli y párpado están enrojecidos, inflamados y adoloridos  · Usted no puede abrir arechiga calli  · Usted tiene cambios en la visión, kandace visión doble  · Arechiga globo ocular sobresale o usted no puede  arechiga calli  · Usted tiene más sueño de lo normal o nota cambios en arechiga habilidad de pensar, moverse o hablar  · Usted tiene el thao rígido, fiebre o un zenia dolor de mikey  · Usted tiene inflamada la frente o el cuero cabelludo  ¿Cuándo palma comunicarme con mi médico?   · Lea síntomas no mejoran después de 3 días      · Lea síntomas no desaparecen después de 10 días  · Usted tiene náuseas y vómitos  · Le sangra la [place] and [place]  · Usted tiene preguntas o inquietudes acerca de pagan condición o cuidado  ACUERDOS SOBRE PAGAN CUIDADO:   Usted tiene el derecho de ayudar a planear pagan cuidado  Aprenda todo lo que pueda sobre pagan condición y kandace darle tratamiento  Discuta ashley opciones de tratamiento con ashley médicos para decidir el cuidado que usted desea recibir  Usted siempre tiene el derecho de rechazar el tratamiento  Esta información es sólo para uso en educación  Pagan intención no es darle un consejo médico sobre enfermedades o tratamientos  Colsulte con pagan Gill Horner farmacéutico antes de seguir cualquier régimen médico para saber si es seguro y efectivo para usted  © 2017 [address] [employer]  Information is for End User's use only and may not be sold, redistributed or otherwise used for commercial purposes  All illustrations and images included in CareNotes® are the copyrighted property of A LYNDA A STACI , Inc  or Urbano Travis

## 2019-03-11 NOTE — PROGRESS NOTES
Assessment/Plan:    No problem-specific Assessment & Plan notes found for this encounter  Problem List Items Addressed This Visit        Other    Neck pain    Relevant Medications    naproxen (NAPROSYN) 500 mg tablet      Other Visit Diagnoses     Acute non-recurrent ethmoidal sinusitis    -  Primary    Relevant Medications    amoxicillin (AMOXIL) 500 mg capsule    Rash        Relevant Medications    predniSONE 10 mg tablet            Subjective:      Patient ID: Anita Wong is a 37 y o  female  HPI  38 y/o F here for 2 weeks of cough, congestion, headache and sore throat  She has been having sinus pressure as well  NOc f/ now but she is getting feverish at night  Symtpoms are getting worse  She notes back pain with cough  She has tried OTC medications but they have not helped  NO GI or  complaints  She also has had a rash on her eyelids   It is mildly itchy  The following portions of the patient's history were reviewed and updated as appropriate: She  has a past medical history of Depression, No known health problems, and Varicose veins of both lower extremities  She   Patient Active Problem List    Diagnosis Date Noted    Neck pain 2018    Vertigo 2018    Fatigue 2017    Vitamin D deficiency 09/15/2014    Headache 2013     She  has a past surgical history that includes  section; Ovarian cyst surgery; and Hernia repair  Her family history includes Diabetes in her other; Hyperlipidemia in her other  She  reports that she has never smoked  She has never used smokeless tobacco  She reports that she does not drink alcohol or use drugs    Current Outpatient Medications   Medication Sig Dispense Refill    amoxicillin (AMOXIL) 500 mg capsule Take 2 capsules (1,000 mg total) by mouth every 8 (eight) hours for 7 days 42 capsule 0    cyclobenzaprine (FLEXERIL) 10 mg tablet Take 1 tablet (10 mg total) by mouth 3 (three) times a day as needed for muscle spasms 30 tablet 1    naproxen (NAPROSYN) 500 mg tablet Take 1 tablet (500 mg total) by mouth 2 (two) times a day with meals 60 tablet 1    predniSONE 10 mg tablet Take 2 tablets (20 mg total) by mouth daily 10 tablet 0     No current facility-administered medications for this visit  Current Outpatient Medications on File Prior to Visit   Medication Sig    cyclobenzaprine (FLEXERIL) 10 mg tablet Take 1 tablet (10 mg total) by mouth 3 (three) times a day as needed for muscle spasms     No current facility-administered medications on file prior to visit  She has No Known Allergies       Review of Systems      Objective:      /64 (BP Location: Right arm, Patient Position: Sitting, Cuff Size: Standard)   Pulse 98   Temp 98 6 °F (37 °C) (Tympanic)   Resp 18   Ht 5' 1" (1 549 m)   Wt 70 8 kg (156 lb)   LMP 02/13/2019 (Approximate)   SpO2 95%   Breastfeeding? No   BMI 29 48 kg/m²          Physical Exam   Constitutional: She is oriented to person, place, and time  She appears well-developed and well-nourished  No distress  HENT:   Head: Normocephalic and atraumatic  Right Ear: External ear normal    Left Ear: External ear normal    Mouth/Throat: No oropharyngeal exudate  Red turbinates     Eyes: Conjunctivae are normal    Neck: Normal range of motion  Neck supple  No thyromegaly present  Cardiovascular: Normal rate, regular rhythm and normal heart sounds  No murmur heard  Pulmonary/Chest: Effort normal and breath sounds normal  No respiratory distress  She has no wheezes  Lymphadenopathy:     She has no cervical adenopathy  Neurological: She is alert and oriented to person, place, and time  Skin: Rash (mild redness over both eyelids) noted  Psychiatric: She has a normal mood and affect  Her behavior is normal    Nursing note and vitals reviewed

## 2019-06-10 ENCOUNTER — OFFICE VISIT (OUTPATIENT)
Dept: OBGYN CLINIC | Facility: CLINIC | Age: 44
End: 2019-06-10

## 2019-06-10 VITALS
WEIGHT: 157 LBS | HEART RATE: 66 BPM | SYSTOLIC BLOOD PRESSURE: 122 MMHG | BODY MASS INDEX: 28.89 KG/M2 | DIASTOLIC BLOOD PRESSURE: 59 MMHG | HEIGHT: 62 IN

## 2019-06-10 DIAGNOSIS — Z12.39 BREAST CANCER SCREENING: ICD-10-CM

## 2019-06-10 DIAGNOSIS — Z01.419 WOMEN'S ANNUAL ROUTINE GYNECOLOGICAL EXAMINATION: Primary | ICD-10-CM

## 2019-06-10 PROCEDURE — 99396 PREV VISIT EST AGE 40-64: CPT | Performed by: NURSE PRACTITIONER

## 2019-07-08 ENCOUNTER — HOSPITAL ENCOUNTER (EMERGENCY)
Facility: HOSPITAL | Age: 44
Discharge: HOME | End: 2019-07-08

## 2019-07-08 VITALS
BODY MASS INDEX: 28.91 KG/M2 | WEIGHT: 158.07 LBS | TEMPERATURE: 98.3 F | HEART RATE: 88 BPM | SYSTOLIC BLOOD PRESSURE: 135 MMHG | RESPIRATION RATE: 16 BRPM | DIASTOLIC BLOOD PRESSURE: 63 MMHG | OXYGEN SATURATION: 97 %

## 2019-07-08 DIAGNOSIS — L30.8 OTHER ECZEMA: Primary | ICD-10-CM

## 2019-07-08 PROCEDURE — 99282 EMERGENCY DEPT VISIT SF MDM: CPT

## 2019-07-08 PROCEDURE — 99283 EMERGENCY DEPT VISIT LOW MDM: CPT | Performed by: PHYSICIAN ASSISTANT

## 2019-07-08 RX ORDER — DIAPER,BRIEF,INFANT-TODD,DISP
EACH MISCELLANEOUS
Qty: 15 G | Refills: 0 | Status: SHIPPED | OUTPATIENT
Start: 2019-07-08 | End: 2021-03-08 | Stop reason: ALTCHOICE

## 2019-07-08 NOTE — ED PROVIDER NOTES
History  Chief Complaint   Patient presents with    Rash     Pt reports itching and swelling of B/L eyelids x2 weeks, spreading to below nose, dneies new exposures  Pt 41 yo f with significant pmh depression and varicose veins here today c/o rash to b/l eyelids and nose x 2 weeks  Pt has not tried any otc medications or lotions for her sx  She states that her skin is very dry and itchy  She denies rash anywhere else beside the face         History provided by:  Patient   used: No    Rash   Location:  Face  Facial rash location:  L eyelid, R eyelid, L eye, R eye, nose and upper lip  Quality: dryness, itchiness, painful, peeling and scaling    Pain details:     Severity:  Mild    Onset quality:  Sudden    Duration:  2 weeks    Timing:  Constant    Progression:  Unchanged  Severity:  Moderate  Onset quality:  Sudden  Duration:  2 weeks  Timing:  Constant  Progression:  Worsening  Chronicity:  New  Relieved by:  None tried  Worsened by:  Nothing  Ineffective treatments:  None tried  Associated symptoms: no abdominal pain, no diarrhea, no fatigue, no fever, no headaches, no myalgias, no nausea, no shortness of breath, no sore throat, no URI, not vomiting and not wheezing        None       Past Medical History:   Diagnosis Date    Depression     No known health problems     Varicose veins of both lower extremities        Past Surgical History:   Procedure Laterality Date     SECTION      HERNIA REPAIR      OVARIAN CYST SURGERY         Family History   Problem Relation Age of Onset    Diabetes Other     Hyperlipidemia Other         pure    Diabetes Mother     Heart failure Father     No Known Problems Sister     No Known Problems Brother     No Known Problems Daughter     No Known Problems Brother     No Known Problems Brother     No Known Problems Brother     No Known Problems Brother     No Known Problems Son      I have reviewed and agree with the history as documented  Social History     Tobacco Use    Smoking status: Never Smoker    Smokeless tobacco: Never Used   Substance Use Topics    Alcohol use: No    Drug use: No        Review of Systems   Constitutional: Negative for chills, fatigue and fever  HENT: Negative for congestion, ear pain and sore throat  Respiratory: Negative for cough, shortness of breath and wheezing  Gastrointestinal: Negative for abdominal pain, diarrhea, nausea and vomiting  Musculoskeletal: Negative for myalgias  Skin: Positive for rash  Negative for color change  Neurological: Negative for light-headedness and headaches  Psychiatric/Behavioral: Negative for behavioral problems  The patient is not hyperactive  All other systems reviewed and are negative  Physical Exam  Physical Exam   Constitutional: She is oriented to person, place, and time  She appears well-developed and well-nourished  No distress  HENT:   Head: Atraumatic  Neck: Neck supple  Pulmonary/Chest: Effort normal  No respiratory distress  Musculoskeletal: She exhibits no edema  Neurological: She is alert and oriented to person, place, and time  Skin: Skin is warm and dry  Rash (dry scaling skin to the b/l eyelids, periorbital areas, nose and upper lip) noted  No erythema  No pallor  Psychiatric: She has a normal mood and affect  Her behavior is normal    Nursing note and vitals reviewed        Vital Signs  ED Triage Vitals [07/08/19 1147]   Temperature Pulse Respirations Blood Pressure SpO2   98 3 °F (36 8 °C) 88 16 135/63 97 %      Temp Source Heart Rate Source Patient Position - Orthostatic VS BP Location FiO2 (%)   Oral Monitor Sitting Right arm --      Pain Score       Worst Possible Pain           Vitals:    07/08/19 1147   BP: 135/63   Pulse: 88   Patient Position - Orthostatic VS: Sitting         Visual Acuity      ED Medications  Medications - No data to display    Diagnostic Studies  Results Reviewed     None                 No orders to display              Procedures  Procedures       ED Course                               MDM    Disposition  Final diagnoses:   Other eczema     Time reflects when diagnosis was documented in both MDM as applicable and the Disposition within this note     Time User Action Codes Description Comment    7/8/2019 12:10 PM Farrar Abraham Add [L30 8] Other eczema       ED Disposition     ED Disposition Condition Date/Time Comment    Discharge Stable Mon Jul 8, 2019 12:10 PM [facility] discharge to home/self care  Follow-up Information     Follow up With Specialties Details Why Contact Info Additional [facility] In 1 week  [address] [place] [id number]  [address] [place] [address],[address] [place],   [facility], [place], [place], [address]          Patient's Medications   Discharge Prescriptions    HYDROCORTISONE 1 % CREAM    Apply to affected area 2 times daily for two weeks       Start Date: 7/8/2019  End Date: --       Order Dose: --       Quantity: 15 g    Refills: 0     No discharge procedures on file      ED Provider  Electronically Signed by           Cyrus Huerta PA-C  07/08/19 6962

## 2019-07-08 NOTE — DISCHARGE INSTRUCTIONS
Eczema   LO QUE NECESITA SABER:   ¿Qué es el eczema? El eczema o dermatitis atópica, es un sarpullido dhillon en la piel que causa comezón  Es más probable que usted lo desarrolle si arechiga padre o madre o algún miembro de arechiga jose sufre de eczema, asma o fiebre del heno  Esta es amira condición de larga duración que podría provocar brotes por el arelis de arechiga cindy  ¿Cuáles son los signos y síntomas del eczema? Usted podría tener parches en la piel secos, rojos y con comezón  Podría también tener protuberancias o ampollas que meaghan costra o supuran un líquido francine  Es posible que arechiga piel presente zonas gruesas, escamosas o duras y parecidas al cuero  ¿Qué provoca el eczema? Cualquier cosa que aumenta la sequedad o ashley deseos de rascarse es un irritante  Los irritantes podrían provocar un brote de eczema  Saniya Alisa son algunas de las causas mas comunes:  · Los ruby de corinna o duchas frecuentes  pueden provocar sequedad y comezón en la piel  · Cambios repentinos de temperatura , kandace el aire frío puede resecar la piel  El calor puede aumentar la sudoración  Ambos pueden provocar la comezón  · [employer] ácaros de polvo y la caspa de mascotas pueden empeorar ashley síntomas  El [place], moho, y humo de cigarrillo también podrían irritar arechiga piel  · Ciertos jabones, maquillaje y productos de limpieza  podrían molestar arechiga piel  Pregúntele a arechiga médico qué clase de productos suaves usted puede [employer]  · Estrés  podría ocasionar que empeore arechiga eczema  ¿Cómo se diagnostica el eczema? Infórmele a arechiga médico si usted sabe lo que provoca arechiga sarpullido  Él querrá saber si algún miembro de arechiga jose sufre de alergias, asma o eczema  No existen exámenes para diagnosticar el eczema  Es probable que arechiga médico le realice exámenes de alergias para determinar si irritan arechiga eczema  ¿Cómo se trata el eczema? El eczema no tiene [place]   La meta del tratamiento es aliviar arechiga dolor y comezón y agregarle humedad a treadwell piel  Ashley síntomas deben [employer] de 3 semanas de [place]  Usted podría necesitar lo siguiente:  · Medicamentos, , kandace los inmunosupresores, ayudan a aliviar la comezón, el enrojecimiento, el dolor y la inflamación  Se pueden administrar en forma de cremas o pastillas  Es probable que usted también reciba antihistamínicos para reducir la comezón o antibióticos si tiene amira infección en la piel  · Fototerapia , o suzanne ultravioleta, podría ayudar a sanar treadwell piel  También se conoce kandace terapia de [place]  ¿Cómo puedo controlar el eczema? · No se rasque  Dése palmaditas o presione la piel para aliviar la comezón  Los síntomas empeorarán si usted se rasca  Mantenga ashley uñas cortas para que no se desgarre la piel si se rasca  · Mantenga treadwell piel húmeda  Aplique amira loción, crema o pomada en treadwell piel después de un baño o amira ducha cuando la piel todavía está húmeda  Pregunte a treadwell médico que usar y con qué frecuencia  · [place] amira ducha o báñese  con agua tibia linda 10 minutos o menos  Use jabón suave  Pregúntele a treadwell médico cuál jabón es adecuado para usted  · Use ropa de algodón  Use ropa holgada de algodón o mezclas de algodón  Evite la ropa de leilani  · Use un humidificador  para añadir humedad en el aire de treadwell hogar  · Evite cambios de temperatura , especialmente actividades que le producen sudoración excesiva porque esto le puede ocasionar comezón  Retire las [employer] de treadwell cama si usted se acalora mientras duerme  · Evite los alérgenos, polvos e irritantes de la piel  Las mascotas no deben ser permitidas dentro de treadwell casa  No use perfume, suavizante de ropa o maquillaje que le produzca amira sensación de ardor o comezón  ¿Cuándo palma buscar atención inmediata? · Usted tiene fiebre o nota chet rojizas subiendo por treadwell Rhonda Mercedes o mosesna  · Treadwell sarpullido se ve más inflamado, dhillon o caliente    ¿Cuándo palma comunicarme con mi médico?   · La mayor parte de treadwell piel está Edith Pap, inflamada, adolorida y [place] con escamas  · Usted desarrolla costras pate, sangrientas y dolorosas  · Pagan piel se ampolla y expulsa pus ochoa o amarillo  · Tiene alguna pregunta acerca de pagan condición o cuidado  ACUERDOS SOBRE PAGAN CUIDADO:   Usted tiene el derecho de ayudar a planear pagan cuidado  Aprenda todo lo que pueda sobre pagan condición y kandace darle tratamiento  Discuta ashley opciones de tratamiento con ashley médicos para decidir el cuidado que usted desea recibir  Usted siempre tiene el derecho de rechazar el tratamiento  Esta información es sólo para uso en educación  Pagan intención no es darle un consejo médico sobre enfermedades o tratamientos  Colsulte con pagan Khloe Boers farmacéutico antes de seguir cualquier régimen médico para saber si es seguro y efectivo para usted  © 2017 [address] [employer] Information is for End User's use only and may not be sold, redistributed or otherwise used for commercial purposes  All illustrations and images included in CareNotes® are the copyrighted property of A D A M , Inc  or Urbano Trvais

## 2019-10-01 ENCOUNTER — TELEPHONE (OUTPATIENT)
Dept: OBGYN CLINIC | Facility: CLINIC | Age: 44
End: 2019-10-01

## 2019-10-01 DIAGNOSIS — Z12.39 BREAST CANCER SCREENING: Primary | ICD-10-CM

## 2019-10-01 NOTE — TELEPHONE ENCOUNTER
Pat from [facility] called regarding 3d mammogram for pt    Fax 3 [phone]

## 2019-12-02 ENCOUNTER — HOSPITAL ENCOUNTER (OUTPATIENT)
Dept: MAMMOGRAPHY | Facility: MEDICAL CENTER | Age: 44
Discharge: HOME | End: 2019-12-02

## 2019-12-02 VITALS — HEIGHT: 62 IN | WEIGHT: 158 LBS | BODY MASS INDEX: 29.08 KG/M2

## 2019-12-02 DIAGNOSIS — Z12.39 BREAST CANCER SCREENING: ICD-10-CM

## 2019-12-02 PROCEDURE — 77063 BREAST TOMOSYNTHESIS BI: CPT

## 2019-12-02 PROCEDURE — 77067 SCR MAMMO BI INCL CAD: CPT

## 2019-12-31 ENCOUNTER — HOSPITAL ENCOUNTER (OUTPATIENT)
Dept: MAMMOGRAPHY | Facility: CLINIC | Age: 44
Discharge: HOME | End: 2019-12-31

## 2019-12-31 VITALS — BODY MASS INDEX: 28.71 KG/M2 | HEIGHT: 62 IN | WEIGHT: 156 LBS

## 2019-12-31 DIAGNOSIS — R92.8 ABNORMAL MAMMOGRAM: ICD-10-CM

## 2019-12-31 PROCEDURE — G0279 TOMOSYNTHESIS, MAMMO: HCPCS

## 2019-12-31 PROCEDURE — 77065 DX MAMMO INCL CAD UNI: CPT

## 2019-12-31 PROCEDURE — 76642 ULTRASOUND BREAST LIMITED: CPT

## 2020-07-26 ENCOUNTER — HOSPITAL ENCOUNTER (EMERGENCY)
Facility: HOSPITAL | Age: 45
Discharge: HOME | End: 2020-07-26
Attending: EMERGENCY MEDICINE | Admitting: EMERGENCY MEDICINE

## 2020-07-26 VITALS
HEART RATE: 82 BPM | WEIGHT: 164.02 LBS | OXYGEN SATURATION: 93 % | BODY MASS INDEX: 30 KG/M2 | SYSTOLIC BLOOD PRESSURE: 118 MMHG | RESPIRATION RATE: 16 BRPM | TEMPERATURE: 98.5 F | DIASTOLIC BLOOD PRESSURE: 67 MMHG

## 2020-07-26 DIAGNOSIS — M72.2 PLANTAR FASCIITIS: Primary | ICD-10-CM

## 2020-07-26 PROCEDURE — 99284 EMERGENCY DEPT VISIT MOD MDM: CPT | Performed by: PHYSICIAN ASSISTANT

## 2020-07-26 PROCEDURE — 99283 EMERGENCY DEPT VISIT LOW MDM: CPT

## 2020-07-26 RX ORDER — NAPROXEN 500 MG/1
500 TABLET ORAL 2 TIMES DAILY WITH MEALS
Qty: 30 TABLET | Refills: 0 | Status: SHIPPED | OUTPATIENT
Start: 2020-07-26 | End: 2021-03-08 | Stop reason: ALTCHOICE

## 2020-07-26 NOTE — ED PROVIDER NOTES
History  Chief Complaint   Patient presents with    Foot Pain     right heel pain and bilat knee pain denies injury, also reports HA, neck pain and fatigue  History provided by:  Patient  Foot Injury - Major   Location:  Foot  Foot location:  R foot  Pain details:     Quality:  Aching and shooting    Severity:  Moderate    Timing:  Intermittent    Progression:  Waxing and waning  Associated symptoms: no fatigue        Prior to Admission Medications   Prescriptions Last Dose Informant Patient Reported? Taking?   hydrocortisone 1 % cream   No No   Sig: Apply to affected area 2 times daily for two weeks      Facility-Administered Medications: None       Past Medical History:   Diagnosis Date    Depression     No known health problems     Varicose veins of both lower extremities        Past Surgical History:   Procedure Laterality Date     SECTION      HERNIA REPAIR      OVARIAN CYST SURGERY         Family History   Problem Relation Age of Onset    Diabetes Other     Hyperlipidemia Other         pure    Diabetes Mother     Heart failure Father     No Known Problems Sister     No Known Problems Brother     No Known Problems Daughter     No Known Problems Brother     No Known Problems Brother     No Known Problems Brother     No Known Problems Brother     No Known Problems Son     No Known Problems Maternal Aunt     No Known Problems Maternal Aunt     No Known Problems Maternal Aunt     No Known Problems Paternal Aunt     No Known Problems Paternal Aunt      I have reviewed and agree with the history as documented  E-Cigarette/Vaping     E-Cigarette/Vaping Substances     Social History     Tobacco Use    Smoking status: Never Smoker    Smokeless tobacco: Never Used   Substance Use Topics    Alcohol use: No    Drug use: No       Review of Systems   Constitutional: Negative for activity change, appetite change and fatigue     HENT: Negative for nosebleeds, sneezing, sore throat, trouble swallowing and voice change  Eyes: Negative for photophobia, pain and visual disturbance  Respiratory: Negative for apnea, choking and stridor  Cardiovascular: Negative for palpitations and leg swelling  Gastrointestinal: Negative for anal bleeding and constipation  Endocrine: Negative for cold intolerance, heat intolerance, polydipsia and polyphagia  Genitourinary: Negative for decreased urine volume, enuresis, frequency, genital sores and urgency  Musculoskeletal: Negative for joint swelling and myalgias  Allergic/Immunologic: Negative for environmental allergies and food allergies  Neurological: Negative for tremors, seizures, speech difficulty and weakness  Hematological: Negative for adenopathy  Psychiatric/Behavioral: Negative for behavioral problems, decreased concentration, dysphoric mood and hallucinations  All other systems reviewed and are negative  Physical Exam  Physical Exam   Constitutional: She is oriented to person, place, and time  She appears well-developed and well-nourished  No distress  HENT:   Head: Normocephalic and atraumatic  Right Ear: External ear normal    Left Ear: External ear normal    Nose: Nose normal    Mouth/Throat: Oropharynx is clear and moist    Eyes: Pupils are equal, round, and reactive to light  Conjunctivae and EOM are normal    Neck: Normal range of motion  Neck supple  Cardiovascular: Normal rate, regular rhythm and normal heart sounds  Exam reveals no gallop and no friction rub  No murmur heard  Pulmonary/Chest: Effort normal and breath sounds normal  No respiratory distress  She has no wheezes  Abdominal: Soft  Bowel sounds are normal    Musculoskeletal: She exhibits tenderness  Pain at the right heel with palpation  Distal NV exam is grossly intact  Neurological: She is alert and oriented to person, place, and time  Skin: Skin is warm and dry  She is not diaphoretic     Psychiatric: She has a normal mood and affect  Her behavior is normal    Vitals reviewed  Vital Signs  ED Triage Vitals   Temperature Pulse Respirations Blood Pressure SpO2   07/26/20 1833 07/26/20 1833 07/26/20 1833 07/26/20 1834 07/26/20 1834   98 5 °F (36 9 °C) 82 16 118/67 93 %      Temp Source Heart Rate Source Patient Position - Orthostatic VS BP Location FiO2 (%)   07/26/20 1833 -- 07/26/20 1833 07/26/20 1833 --   Temporal  Sitting Right arm       Pain Score       07/26/20 1834       5           Vitals:    07/26/20 1833 07/26/20 1834   BP:  118/67   Pulse: 82    Patient Position - Orthostatic VS: Sitting Sitting         Visual Acuity      ED Medications  Medications - No data to display    Diagnostic Studies  Results Reviewed     None                 No orders to display              Procedures  Procedures         ED Course       US AUDIT      Most Recent Value   Initial Alcohol Screen: US AUDIT-C    1  How often do you have a drink containing alcohol?  0 Filed at: 07/26/2020 1833   2  How many drinks containing alcohol do you have on a typical day you are drinking? 0 Filed at: 07/26/2020 1833   3b  FEMALE Any Age, or MALE 65+: How often do you have 4 or more drinks on one occassion? 0 Filed at: 07/26/2020 1833   Audit-C Score  0 Filed at: 07/26/2020 1833                  ANI/DAST-10      Most Recent Value   How many times in the past year have you    Used an illegal drug or used a prescription medication for non-medical reasons? Never Filed at: 07/26/2020 1833                                MDM      Disposition  Final diagnoses:   Plantar fasciitis     Time reflects when diagnosis was documented in both MDM as applicable and the Disposition within this note     Time User Action Codes Description Comment    7/26/2020  7:48 PM Carole Hernandez Add [M72 2] Plantar fasciitis       ED Disposition     ED Disposition Condition Date/Time Comment    Discharge Stable Sun Jul 26, 2020  7:48 PM Trevor Curry discharge to home/self care  Follow-up Information     Follow up With Specialties Details Why Contact Info    Federico Ponce DPM Podiatry, Wound Care Schedule an appointment as soon as possible for a visit   [address]  [phone]            Patient's Medications   Discharge Prescriptions    NAPROXEN (NAPROSYN) 500 MG TABLET    Take 1 tablet (500 mg total) by mouth 2 (two) times a day with meals       Start Date: 7/26/2020 End Date: --       Order Dose: 500 mg       Quantity: 30 tablet    Refills: 0     No discharge procedures on file      PDMP Review     None          ED Provider  Electronically Signed by           Edwin Coleman PA-C  07/26/20 4785

## 2020-11-16 ENCOUNTER — APPOINTMENT (OUTPATIENT)
Dept: RADIOLOGY | Facility: MEDICAL CENTER | Age: 45
End: 2020-11-16

## 2020-11-16 ENCOUNTER — OFFICE VISIT (OUTPATIENT)
Dept: OBGYN CLINIC | Facility: MEDICAL CENTER | Age: 45
End: 2020-11-16

## 2020-11-16 VITALS
WEIGHT: 160 LBS | DIASTOLIC BLOOD PRESSURE: 79 MMHG | HEART RATE: 79 BPM | SYSTOLIC BLOOD PRESSURE: 131 MMHG | BODY MASS INDEX: 29.26 KG/M2

## 2020-11-16 DIAGNOSIS — R10.31 RIGHT GROIN PAIN: ICD-10-CM

## 2020-11-16 DIAGNOSIS — M72.2 PLANTAR FASCIITIS OF RIGHT FOOT: Primary | ICD-10-CM

## 2020-11-16 PROCEDURE — 20605 DRAIN/INJ JOINT/BURSA W/O US: CPT | Performed by: EMERGENCY MEDICINE

## 2020-11-16 PROCEDURE — 99203 OFFICE O/P NEW LOW 30 MIN: CPT | Performed by: EMERGENCY MEDICINE

## 2020-11-16 PROCEDURE — 73502 X-RAY EXAM HIP UNI 2-3 VIEWS: CPT

## 2020-11-16 RX ORDER — BETAMETHASONE SODIUM PHOSPHATE AND BETAMETHASONE ACETATE 3; 3 MG/ML; MG/ML
6 INJECTION, SUSPENSION INTRA-ARTICULAR; INTRALESIONAL; INTRAMUSCULAR; SOFT TISSUE
Status: COMPLETED | OUTPATIENT
Start: 2020-11-16 | End: 2020-11-16

## 2020-11-16 RX ORDER — LIDOCAINE HYDROCHLORIDE 10 MG/ML
1 INJECTION, SOLUTION INFILTRATION; PERINEURAL
Status: COMPLETED | OUTPATIENT
Start: 2020-11-16 | End: 2020-11-16

## 2020-11-16 RX ADMIN — LIDOCAINE HYDROCHLORIDE 1 ML: 10 INJECTION, SOLUTION INFILTRATION; PERINEURAL at 16:09

## 2020-11-16 RX ADMIN — BETAMETHASONE SODIUM PHOSPHATE AND BETAMETHASONE ACETATE 6 MG: 3; 3 INJECTION, SUSPENSION INTRA-ARTICULAR; INTRALESIONAL; INTRAMUSCULAR; SOFT TISSUE at 16:09

## 2020-12-14 ENCOUNTER — OFFICE VISIT (OUTPATIENT)
Dept: OBGYN CLINIC | Facility: MEDICAL CENTER | Age: 45
End: 2020-12-14

## 2020-12-14 VITALS
DIASTOLIC BLOOD PRESSURE: 74 MMHG | SYSTOLIC BLOOD PRESSURE: 112 MMHG | HEIGHT: 61 IN | WEIGHT: 160 LBS | HEART RATE: 73 BPM | BODY MASS INDEX: 30.21 KG/M2

## 2020-12-14 DIAGNOSIS — R10.31 RIGHT GROIN PAIN: ICD-10-CM

## 2020-12-14 DIAGNOSIS — R10.31 GROIN PAIN, RIGHT: ICD-10-CM

## 2020-12-14 DIAGNOSIS — M72.2 PLANTAR FASCIITIS OF RIGHT FOOT: Primary | ICD-10-CM

## 2020-12-14 PROCEDURE — 99213 OFFICE O/P EST LOW 20 MIN: CPT | Performed by: EMERGENCY MEDICINE

## 2020-12-14 RX ORDER — MELOXICAM 7.5 MG/1
TABLET ORAL
Qty: 30 TABLET | Refills: 1 | Status: SHIPPED | OUTPATIENT
Start: 2020-12-14 | End: 2021-01-11 | Stop reason: SDUPTHER

## 2021-01-11 ENCOUNTER — OFFICE VISIT (OUTPATIENT)
Dept: OBGYN CLINIC | Facility: MEDICAL CENTER | Age: 46
End: 2021-01-11

## 2021-01-11 VITALS
BODY MASS INDEX: 30.21 KG/M2 | HEART RATE: 69 BPM | SYSTOLIC BLOOD PRESSURE: 132 MMHG | DIASTOLIC BLOOD PRESSURE: 70 MMHG | WEIGHT: 160 LBS | HEIGHT: 61 IN

## 2021-01-11 DIAGNOSIS — R10.31 RIGHT GROIN PAIN: ICD-10-CM

## 2021-01-11 DIAGNOSIS — M72.2 PLANTAR FASCIITIS OF RIGHT FOOT: Primary | ICD-10-CM

## 2021-01-11 PROCEDURE — 99213 OFFICE O/P EST LOW 20 MIN: CPT | Performed by: EMERGENCY MEDICINE

## 2021-01-11 RX ORDER — MELOXICAM 7.5 MG/1
TABLET ORAL
Qty: 30 TABLET | Refills: 1 | Status: SHIPPED | OUTPATIENT
Start: 2021-01-11 | End: 2021-03-08 | Stop reason: ALTCHOICE

## 2021-01-11 NOTE — PATIENT INSTRUCTIONS
Fascitis plantar   LO QUE NECESITA SABER:   El reposo, el hielo y los soportes para pies pueden ayudar a arechiga fascia plantar a sanar  Es posible que usted necesite medicamentos para disminuir la hinchazón y el dolor  Un fisioterapeuta puede enseñarle ejercicios que contribuyan a mejorar arechiga movimiento y fuerza, y a aliviar el dolor  INSTRUCCIONES SOBRE EL DINA HOSPITALARIA:   Comuníquese con arechiga médico o terapeuta si:  · Arechiga dolor e inflamación aumentan    · Presenta dolor de rodilla, cadera o espalda  · Usted tiene preguntas o inquietudes acerca de arechiga condición o cuidado  Medicamentos: Es posible que usted necesite alguno de los siguientes:  · Acetaminofén more el dolor y baja la fiebre  Está disponible sin receta médica  Pregunte qué cantidad debe darle a arechiga lloyd y con qué frecuencia  [facility]  Cindi las etiquetas de todos los demás medicamentos que esté tomando arechiga hijo para saber si también contienen acetaminofén, o pregunte a arechiga médico o farmacéutico  El acetaminofén puede causar daño en el hígado cuando no se javy de forma correcta  · [place], kandace el ibuprofeno, [facility] a disminuir la inflamación, el dolor y la [place]  Los ZEINAB pueden causar sangrado estomacal o problemas renales en ciertas personas  Si usted javy un medicamento anticoagulante, siempre pregúntele a arechiga médico si los ZEINAB son seguros para usted  Siempre cindi la etiqueta de shena medicamento y [place] instrucciones  · [place] ashley medicamentos kandace se le haya indicado  Consulte con arechiga médico si usted christine que arechiga medicamento no le está ayudando o si presenta efectos secundarios  Infórmele si es alérgico a algún medicamento  Mantenga amira lista actualizada de los [place], las vitaminas y los productos herbales que javy  Incluya los siguientes datos de los medicamentos: cantidad, frecuencia y motivo de administración  Traiga con usted la lista o los envases de las píldoras a ashley citas de seguimiento   Lleve la [facility] medicamentos con usted en cornelio de amira emergencia  Cuidados personales:  · Use la férula o las plantillas kandace se le indique  Es posible que usted necesite usar amira férula por la noche para mantener el pie estirado mientras duerme  [place] ayudará a evitar el dolor margo temprano por la [place]  Las plantillas ayudarán a disminuir la tensión en la fascia plantar al caminar o hacer ejercicio  · Repose según le indicaron  Descanse tanto kandace sea posible para disminuir la hinchazón y evitar un daño mayor  Pregunte a arechiga médico cuándo puede retomar ashley [employer]  · Aplique hielo sobre la fascia plantar  El hielo ayuda a evitar daño al tejido y a disminuir la inflamación y el dolor  Llene amira botella de agua con agua y congélela  Envuelva amira toalla alrededor de la botella o cúbrala con amira [place]  Ruede la botella del agua bajo el pie por 10 minutos en la mañana y después del [place]  · Masaje de la fascia plantar kandace le indicaron  [place] podría ayudar a disminuir la hinchazón y el dolor  Ruede amira pelota de golf debajo de los pies linda 10 minutos  Ag esto 3 veces al día  · [place]aya a terapia física según indicaciones  Un fisioterapeuta le puede enseñar ejercicios para ayudarle a mejorar el movimiento y la fuerza, y para disminuir el dolor  Evite la fascitis plantar:  · Mantenga un peso saludable  [place] ayudará a diminuir la [place]  Consulte con arechiga médico cuánto debería pesar  Pida que le ayude a crear un plan para bajar de peso si usted tiene sobrepeso  · Ag ejercicios de bajo impacto  Los ejercicios de bajo impacto disminuyen la tensión en la fascia plantar  Por ejemplo, nadar o andar en bicicleta  · Comience nuevas actividades lentamente  Aumente gradualmente la intensidad y la duración  · Use calzado que le quede eva y que brinde soporte al arco  Reemplace el calzado antes de que la plantilla o el amortiguador de golpes se desgaste   Evite caminar o pararse descalzo o en sandalias por largos períodos de tiempo  · Realice ejercicios de calentamiento antes de hacer amira actividad física  Pregunte a arechiga médico cómo estirar los músculos de la fascia plantar y la pantorilla  Acuda a ashley consultas de control con arechiga médico o podólogo según le indicaron: Anote ashley preguntas para que se acuerde de hacerlas linda ashley visitas  © [address] Information is for End User's use only and may not be sold, redistributed or otherwise used for commercial purposes  All illustrations and images included in CareNotes® are the copyrighted property of [employer]  or [address] [place] [address] es sólo para uso en educación  Arechiga intención no es darle un consejo médico sobre enfermedades o tratamientos  Colsulte con arechiga Eduardo Mcdonnell farmacéutico antes de seguir cualquier régimen médico para saber si es seguro y efectivo para usted

## 2021-01-11 NOTE — PROGRESS NOTES
Assessment/Plan:    Diagnoses and all orders for this visit:    Plantar fasciitis of right foot  -     meloxicam (MOBIC) 7 5 mg tablet; 1-2 tabs PO daily PRN pain    Right groin pain  -     Steroid Injection; Future    Patient without health insurance wishes to proceed with diagnostic and therapeutic steroid injection right hip for groin pain  Xrays wnl  Patient declined PT  Heel cups, continue ice, meloxicam and stretches for right foot  Return if symptoms worsen or fail to improve  Chief Complaint:     Chief Complaint   Patient presents with    Right Foot - Follow-up       Subjective:   Patient ID: Valdez Alejandra is a 39 y o  female  Patient returns with mild improvement of the right foot, however still with right groin pain  She is unable to start PT as she does not have insurance  She does get benefit from meloxicam   She works in [employer] and does a lot of standing  Her right heel pain is sharp, usually worse with first couple steps after sitting or out of bed  Previous note:  Patient returns s/p PF steroid injection right foot with no improvement, taking motrin  Pain is consistent with PF  She has been ice massaging with water bottle  She also continues with right groin pain worse with lifting her leg such as when she is in the shower trying to shave  Denies any back pain  She also complains of diffuse pain and aches she needs to establish with PCP    Initial note:   NP presents for chronic right foot/heel pain denies injury however patient does stand for 12 hour shifts at a [facility]  She was recently evaluated with Xrays of the right foot provided naproxen  Pain is worse with first few steps after sitting/lying down  Throbbing pain  She also c/o right groin pain no injury worse with movement of the hip particularly abduction      Review of Systems    The following portions of the patient's chart were reviewed and updated as appropriate:    Allergy:  No Known Allergies      Past Medical History:   Diagnosis Date    Depression     No known health problems     Varicose veins of both lower extremities        Past Surgical History:   Procedure Laterality Date     SECTION      HERNIA REPAIR      OVARIAN CYST SURGERY         Social History     Socioeconomic History    Marital status: Single     Spouse name: Not on file    Number of children: Not on file    Years of education: Not on file    Highest education level: Not on file   Occupational History    Not on file   Social Needs    Financial resource strain: Not on file    Food insecurity     Worry: Not on file     Inability: Not on file    Transportation needs     Medical: Not on file     Non-medical: Not on file   Tobacco Use    Smoking status: Never Smoker    Smokeless tobacco: Never Used   Substance and Sexual Activity    Alcohol use: No    Drug use: No    Sexual activity: Not Currently     Partners: Male   Lifestyle    Physical activity     Days per week: Not on file     Minutes per session: Not on file    Stress: Not on file   Relationships    Social connections     Talks on phone: Not on file     Gets together: Not on file     Attends Restorationism service: Not on file     Active member of club or organization: Not on file     Attends meetings of clubs or organizations: Not on file     Relationship status: Not on file    Intimate partner violence     Fear of current or ex partner: Not on file     Emotionally abused: Not on file     Physically abused: Not on file     Forced sexual activity: Not on file   Other Topics Concern    Not on file   Social History Narrative    No preference on Restorationism beliefs       Family History   Problem Relation Age of Onset    Diabetes Other     Hyperlipidemia Other         pure    Diabetes Mother     Heart failure Father     No Known Problems Sister     No Known Problems Brother     No Known Problems Daughter     No Known Problems Brother     No Known Problems Brother     No Known Problems Brother     No Known Problems Brother     No Known Problems Son     No Known Problems Maternal Aunt     No Known Problems Maternal Aunt     No Known Problems Maternal Aunt     No Known Problems Paternal Aunt     No Known Problems Paternal Aunt        Medications:    Current Outpatient Medications:     meloxicam (MOBIC) 7 5 mg tablet, 1-2 tabs PO daily PRN pain, Disp: 30 tablet, Rfl: 1    hydrocortisone 1 % cream, Apply to affected area 2 times daily for two weeks (Patient not taking: Reported on 12/14/2020), Disp: 15 g, Rfl: 0    naproxen (NAPROSYN) 500 mg tablet, Take 1 tablet (500 mg total) by mouth 2 (two) times a day with meals (Patient not taking: Reported on 12/14/2020), Disp: 30 tablet, Rfl: 0    Patient Active Problem List   Diagnosis    Vitamin D deficiency    Headache    Fatigue    Neck pain    Vertigo       Objective:  /70   Pulse 69   Ht 5' 1" (1 549 m)   Wt 72 6 kg (160 lb)   BMI 30 23 kg/m²     Right Hip Exam     Comments:  Right groin pain with PROM            Physical Exam  Vitals signs reviewed  Constitutional:       Appearance: She is well-developed  HENT:      Head: Normocephalic and atraumatic  Eyes:      Conjunctiva/sclera: Conjunctivae normal    Neck:      Musculoskeletal: Normal range of motion and neck supple  Cardiovascular:      Rate and Rhythm: Normal rate  Pulmonary:      Effort: Pulmonary effort is normal  No respiratory distress  Skin:     General: Skin is warm and dry  Neurological:      Mental Status: She is alert and oriented to person, place, and time  Psychiatric:         Behavior: Behavior normal            Neurologic Exam     Mental Status   Oriented to person, place, and time  Procedures    I have personally reviewed the written report of the pertinent studies

## 2021-01-21 ENCOUNTER — TRANSCRIBE ORDERS (OUTPATIENT)
Dept: ADMINISTRATIVE | Facility: HOSPITAL | Age: 46
End: 2021-01-21

## 2021-01-21 DIAGNOSIS — R10.31 RIGHT GROIN PAIN: Primary | ICD-10-CM

## 2021-02-02 NOTE — NURSING NOTE
Call placed to patient [facility] [place] and complete consultation with patient, in patient's native language of [place]ian  Patient is having fluoroscopy guided right hip injection  Reviewed with patient her allergies , no current anticoagulant medication present per patient , also discussed the pre and post procedure expectations  Patient made aware of need for  post procedure  Reminded patient of location and time expected for procedure, Patient expressed understanding by verbalizing and repeating instructions

## 2021-02-10 ENCOUNTER — HOSPITAL ENCOUNTER (OUTPATIENT)
Dept: RADIOLOGY | Facility: HOSPITAL | Age: 46
Discharge: HOME | End: 2021-02-10
Attending: EMERGENCY MEDICINE

## 2021-02-10 DIAGNOSIS — R10.31 RIGHT GROIN PAIN: ICD-10-CM

## 2021-02-10 PROCEDURE — 77002 NEEDLE LOCALIZATION BY XRAY: CPT

## 2021-02-10 PROCEDURE — 20610 DRAIN/INJ JOINT/BURSA W/O US: CPT

## 2021-02-10 RX ORDER — LIDOCAINE HYDROCHLORIDE 10 MG/ML
5 INJECTION, SOLUTION EPIDURAL; INFILTRATION; INTRACAUDAL; PERINEURAL
Status: DISCONTINUED | OUTPATIENT
Start: 2021-02-10 | End: 2021-02-11 | Stop reason: HOSPADM

## 2021-02-10 RX ORDER — BUPIVACAINE HYDROCHLORIDE 2.5 MG/ML
2 INJECTION, SOLUTION EPIDURAL; INFILTRATION; INTRACAUDAL
Status: DISCONTINUED | OUTPATIENT
Start: 2021-02-10 | End: 2021-02-11 | Stop reason: HOSPADM

## 2021-02-10 RX ORDER — METHYLPREDNISOLONE ACETATE 80 MG/ML
80 INJECTION, SUSPENSION INTRA-ARTICULAR; INTRALESIONAL; INTRAMUSCULAR; SOFT TISSUE
Status: DISCONTINUED | OUTPATIENT
Start: 2021-02-10 | End: 2021-02-11 | Stop reason: HOSPADM

## 2021-02-10 RX ADMIN — IOHEXOL 2 ML: 300 INJECTION, SOLUTION INTRAVENOUS at 08:40

## 2021-03-08 ENCOUNTER — ANNUAL EXAM (OUTPATIENT)
Dept: OBGYN CLINIC | Facility: CLINIC | Age: 46
End: 2021-03-08

## 2021-03-08 VITALS
HEART RATE: 77 BPM | DIASTOLIC BLOOD PRESSURE: 62 MMHG | SYSTOLIC BLOOD PRESSURE: 123 MMHG | WEIGHT: 177 LBS | HEIGHT: 61 IN | BODY MASS INDEX: 33.42 KG/M2

## 2021-03-08 DIAGNOSIS — Z12.4 SCREENING FOR CERVICAL CANCER: ICD-10-CM

## 2021-03-08 DIAGNOSIS — Z01.419 WELL WOMAN EXAM: Primary | ICD-10-CM

## 2021-03-08 DIAGNOSIS — Z11.3 SCREEN FOR STD (SEXUALLY TRANSMITTED DISEASE): ICD-10-CM

## 2021-03-08 DIAGNOSIS — Z72.51 HIGH RISK HETEROSEXUAL BEHAVIOR: ICD-10-CM

## 2021-03-08 PROCEDURE — 87491 CHLMYD TRACH DNA AMP PROBE: CPT | Performed by: OBSTETRICS & GYNECOLOGY

## 2021-03-08 PROCEDURE — 99396 PREV VISIT EST AGE 40-64: CPT | Performed by: OBSTETRICS & GYNECOLOGY

## 2021-03-08 PROCEDURE — 87591 N.GONORRHOEAE DNA AMP PROB: CPT | Performed by: OBSTETRICS & GYNECOLOGY

## 2021-03-08 PROCEDURE — G0145 SCR C/V CYTO,THINLAYER,RESCR: HCPCS | Performed by: OBSTETRICS & GYNECOLOGY

## 2021-03-08 PROCEDURE — 87624 HPV HI-RISK TYP POOLED RSLT: CPT | Performed by: OBSTETRICS & GYNECOLOGY

## 2021-03-08 NOTE — PROGRESS NOTES
ASSESSMENT & PLAN: Adithya Rider is a 39 y o  [id number] with normal gynecologic exam     1   Routine well woman exam done today  2   Pap and HPV:Pap with HPV was done today  Current ASCCP Guidelines reviewed  3   Mammogram ordered  Recommend yearly mammography  4   The patient accepted STD testing  Gonorrhea, RPR, chlamydia, HIV and HPV testing performed  Safe sex practices have been discussed  5  The patient is not sexually active  She refused contraception and options have been discussed  6  The following were reviewed in today's visit: menopause, osteoporosis, adequate intake of calcium and vitamin D, exercise and healthy diet  7  Patient to return to office in 12 months for annual exam    8  Wrist pain that sounds like carpal tunnel: patient referred to PCP  All questions have been answered to her satisfaction  CC:  Annual Gynecologic Examination    HPI: Adithya Rider is a 39 y o  [id number] who presents for annual gynecologic examination  She states that she has no GYN related complaints  She has the following concerns:  Wrist pain that wakes her up at night  She states that she would like to lose weight and gain energy but that she does not have time to eat healthy  Period: q28 days, flow for 4 days - in the last year, it has become more irregular, doesn't know when her mother underwent menopause    There is no family history of cancer  She does not report hot flashes, night sweats, other symptoms of menopause  She does not report urinary incontinence  Health Maintenance:    She exercises 0 days per week  She wears her seatbelt routinely  She does perform regular monthly self breast exams  She has not detected any lumps  She feels safe at home  She does not follow a balanced diet      She does not use tobacco    Past Medical History:   Diagnosis Date              Varicose veins of both lower extremities        Past Surgical History:   Procedure Laterality Date     SECTION      FL INJECTION RIGHT HIP (NON ARTHROGRAM)  2/10/2021    HERNIA REPAIR      OVARIAN CYST SURGERY         Past OB/Gyn History: [id number]    History of sexually transmitted infection No  Patient is not currently sexually active: heterosexual  Birth control: nothing since   Future fertility: is not desired      Last Pap  2015 :  no abnormalities;  HPV negative    OB History    Para Term  AB Living   3 3 2 0 0 2   SAB TAB Ectopic Multiple Live Births   0 0 0 0 0      # Outcome Date GA Lbr Thomas/2nd Weight Sex Delivery Anes PTL Lv   3 Term            2 Term            1 Para      CS-Unspec          Family History  Family History   Problem Relation Age of Onset    Diabetes Other     Hyperlipidemia Other         pure    Diabetes Mother     Heart failure Father     No Known Problems Sister     No Known Problems Brother     No Known Problems Daughter     No Known Problems Brother     No Known Problems Brother     No Known Problems Brother     No Known Problems Brother     No Known Problems Son     No Known Problems Maternal Aunt     No Known Problems Maternal Aunt     No Known Problems Maternal Aunt     No Known Problems Paternal Aunt     No Known Problems Paternal Aunt        Social History:  Social History     Socioeconomic History    Marital status: Single     Spouse name: Not on file    Number of children: Not on file    Years of education: Not on file    Highest education level: Not on file   Occupational History    Not on file   Social Needs    Financial resource strain: Not on file    Food insecurity     Worry: Not on file     Inability: Not on file    Transportation needs     Medical: Not on file     Non-medical: Not on file   Tobacco Use    Smoking status: Never Smoker    Smokeless tobacco: Never Used   Substance and Sexual Activity    Alcohol use: No    Drug use: No    Sexual activity: Not Currently     Partners: Male   Lifestyle    Physical activity     Days per week: Not on file     Minutes per session: Not on file    Stress: Not on file   Relationships    Social connections     Talks on phone: Not on file     Gets together: Not on file     Attends Mosque service: Not on file     Active member of club or organization: Not on file     Attends meetings of clubs or organizations: Not on file     Relationship status: Not on file    Intimate partner violence     Fear of current or ex partner: Not on file     Emotionally abused: Not on file     Physically abused: Not on file     Forced sexual activity: Not on file   Other Topics Concern    Not on file   Social History Narrative    No preference on Mosque beliefs     Presently lives with her children  Patient is   Patient is currently employed at a [employer]  Allergies:  No Known Allergies    Medications:    Current Outpatient Medications: None    Review of Systems:  Denies fevers, chills, unintentional weight loss, excessive fatigue, chest pain, shortness of breath, abdominal pain, nausea, vomiting, urinary incontinence, urinary frequency, vaginal bleeding, vaginal discharge  All other systems negative unless otherwise stated  Physical Exam:  Vitals:    03/08/21 0905   BP: 123/62   Pulse: 77       GEN: The patient was alert and oriented x3, pleasant well-appearing female in no acute distress  HEENT:  Unremarkable, no anterior or posterior lymphadenopathy, no thyromegaly  CV:  Regular rate and rhythm, normal S1 and S2, no murmurs  RESP:  Clear to auscultation bilaterally, no wheezes, rales or rhonchi  BREAST:  Symmetric breasts with no palpable breast masses or obvious breast lesions  She has no retractions or nipple discharge  She has no axillary abnormalities or palpable masses  GI:  Soft, nontender, non-distended  MSK: bilateral lower extremities are nontender, no edema  : Normal appearing external female genitalia, normal appearing urethral meatus   On sterile speculum exam, normal appearing vaginal epithelium, no vaginal discharge, no bleeding, grossly normal appearing cervix  On bimanual exam, no cervical motion tenderness; uterus is smooth, mobile, nontender 6 weeks size  No tenderness or fullness in the bilateral adnexa  Significant cystocele appreciated

## 2021-03-09 LAB
C TRACH DNA SPEC QL NAA+PROBE: NEGATIVE
N GONORRHOEA DNA SPEC QL NAA+PROBE: NEGATIVE

## 2021-03-10 LAB
HPV HR 12 DNA CVX QL NAA+PROBE: NEGATIVE
HPV16 DNA CVX QL NAA+PROBE: NEGATIVE
HPV18 DNA CVX QL NAA+PROBE: NEGATIVE

## 2021-03-13 LAB
LAB AP GYN PRIMARY INTERPRETATION: NORMAL
Lab: NORMAL

## 2021-03-22 ENCOUNTER — APPOINTMENT (OUTPATIENT)
Dept: LAB | Facility: CLINIC | Age: 46
End: 2021-03-22

## 2021-03-22 ENCOUNTER — OFFICE VISIT (OUTPATIENT)
Dept: FAMILY MEDICINE CLINIC | Facility: CLINIC | Age: 46
End: 2021-03-22

## 2021-03-22 VITALS
DIASTOLIC BLOOD PRESSURE: 66 MMHG | SYSTOLIC BLOOD PRESSURE: 100 MMHG | WEIGHT: 176 LBS | BODY MASS INDEX: 33.25 KG/M2 | RESPIRATION RATE: 16 BRPM | HEART RATE: 78 BPM | OXYGEN SATURATION: 99 % | TEMPERATURE: 98.6 F

## 2021-03-22 DIAGNOSIS — R53.83 FATIGUE, UNSPECIFIED TYPE: ICD-10-CM

## 2021-03-22 DIAGNOSIS — Z01.419 WELL WOMAN EXAM: ICD-10-CM

## 2021-03-22 DIAGNOSIS — H11.002 PTERYGIUM EYE, LEFT: ICD-10-CM

## 2021-03-22 DIAGNOSIS — G89.29 CHRONIC PAIN OF BOTH KNEES: ICD-10-CM

## 2021-03-22 DIAGNOSIS — Z59.89 DOES NOT HAVE HEALTH INSURANCE: ICD-10-CM

## 2021-03-22 DIAGNOSIS — M25.562 CHRONIC PAIN OF BOTH KNEES: ICD-10-CM

## 2021-03-22 DIAGNOSIS — M25.561 CHRONIC PAIN OF BOTH KNEES: ICD-10-CM

## 2021-03-22 DIAGNOSIS — Z23 NEED FOR VACCINATION: Primary | ICD-10-CM

## 2021-03-22 PROBLEM — Z59.71 DOES NOT HAVE HEALTH INSURANCE: Status: ACTIVE | Noted: 2021-03-22

## 2021-03-22 LAB
ANION GAP SERPL CALCULATED.3IONS-SCNC: 4 MMOL/L (ref 4–13)
BASOPHILS # BLD AUTO: 0.08 THOUSANDS/ΜL (ref 0–0.1)
BASOPHILS NFR BLD AUTO: 1 % (ref 0–1)
BUN SERPL-MCNC: 16 MG/DL (ref 5–25)
CALCIUM SERPL-MCNC: 8.8 MG/DL (ref 8.3–10.1)
CHLORIDE SERPL-SCNC: 112 MMOL/L (ref 100–108)
CHOLEST SERPL-MCNC: 191 MG/DL (ref 50–200)
CO2 SERPL-SCNC: 24 MMOL/L (ref 21–32)
CREAT SERPL-MCNC: 0.65 MG/DL (ref 0.6–1.3)
EOSINOPHIL # BLD AUTO: 0.06 THOUSAND/ΜL (ref 0–0.61)
EOSINOPHIL NFR BLD AUTO: 1 % (ref 0–6)
ERYTHROCYTE [DISTWIDTH] IN BLOOD BY AUTOMATED COUNT: 12.6 % (ref 11.6–15.1)
EST. AVERAGE GLUCOSE BLD GHB EST-MCNC: 123 MG/DL
GFR SERPL CREATININE-BSD FRML MDRD: 108 ML/MIN/1.73SQ M
GLUCOSE P FAST SERPL-MCNC: 92 MG/DL (ref 65–99)
HBA1C MFR BLD: 5.9 %
HCT VFR BLD AUTO: 40 % (ref 34.8–46.1)
HCV AB SER QL: NORMAL
HDLC SERPL-MCNC: 75 MG/DL
HGB BLD-MCNC: 13 G/DL (ref 11.5–15.4)
IMM GRANULOCYTES # BLD AUTO: 0.02 THOUSAND/UL (ref 0–0.2)
IMM GRANULOCYTES NFR BLD AUTO: 0 % (ref 0–2)
LDLC SERPL CALC-MCNC: 104 MG/DL (ref 0–100)
LYMPHOCYTES # BLD AUTO: 2.31 THOUSANDS/ΜL (ref 0.6–4.47)
LYMPHOCYTES NFR BLD AUTO: 32 % (ref 14–44)
MCH RBC QN AUTO: 32.8 PG (ref 26.8–34.3)
MCHC RBC AUTO-ENTMCNC: 32.5 G/DL (ref 31.4–37.4)
MCV RBC AUTO: 101 FL (ref 82–98)
MONOCYTES # BLD AUTO: 0.46 THOUSAND/ΜL (ref 0.17–1.22)
MONOCYTES NFR BLD AUTO: 6 % (ref 4–12)
NEUTROPHILS # BLD AUTO: 4.38 THOUSANDS/ΜL (ref 1.85–7.62)
NEUTS SEG NFR BLD AUTO: 60 % (ref 43–75)
NONHDLC SERPL-MCNC: 116 MG/DL
NRBC BLD AUTO-RTO: 0 /100 WBCS
PLATELET # BLD AUTO: 365 THOUSANDS/UL (ref 149–390)
PMV BLD AUTO: 10 FL (ref 8.9–12.7)
POTASSIUM SERPL-SCNC: 3.9 MMOL/L (ref 3.5–5.3)
RBC # BLD AUTO: 3.96 MILLION/UL (ref 3.81–5.12)
SODIUM SERPL-SCNC: 140 MMOL/L (ref 136–145)
TRIGL SERPL-MCNC: 59 MG/DL
TSH SERPL DL<=0.05 MIU/L-ACNC: 3.73 UIU/ML (ref 0.36–3.74)
WBC # BLD AUTO: 7.31 THOUSAND/UL (ref 4.31–10.16)

## 2021-03-22 PROCEDURE — 86803 HEPATITIS C AB TEST: CPT

## 2021-03-22 PROCEDURE — 80061 LIPID PANEL: CPT

## 2021-03-22 PROCEDURE — 87389 HIV-1 AG W/HIV-1&-2 AB AG IA: CPT

## 2021-03-22 PROCEDURE — 80048 BASIC METABOLIC PNL TOTAL CA: CPT

## 2021-03-22 PROCEDURE — 83036 HEMOGLOBIN GLYCOSYLATED A1C: CPT

## 2021-03-22 PROCEDURE — 84443 ASSAY THYROID STIM HORMONE: CPT

## 2021-03-22 PROCEDURE — 99214 OFFICE O/P EST MOD 30 MIN: CPT | Performed by: NURSE PRACTITIONER

## 2021-03-22 PROCEDURE — 86592 SYPHILIS TEST NON-TREP QUAL: CPT

## 2021-03-22 PROCEDURE — 36415 COLL VENOUS BLD VENIPUNCTURE: CPT

## 2021-03-22 PROCEDURE — 85025 COMPLETE CBC W/AUTO DIFF WBC: CPT

## 2021-03-22 RX ORDER — METHYLPREDNISOLONE 4 MG/1
TABLET ORAL
Qty: 21 EACH | Refills: 0 | Status: SHIPPED | OUTPATIENT
Start: 2021-03-22 | End: 2021-08-09

## 2021-03-22 RX ORDER — IBUPROFEN 800 MG/1
800 TABLET ORAL EVERY 6 HOURS PRN
Qty: 30 TABLET | Refills: 0 | Status: SHIPPED | OUTPATIENT
Start: 2021-03-22 | End: 2021-08-09 | Stop reason: SDUPTHER

## 2021-03-22 RX ORDER — SENNOSIDES 8.6 MG
650 CAPSULE ORAL EVERY 8 HOURS PRN
Qty: 30 TABLET | Refills: 0 | Status: SHIPPED | OUTPATIENT
Start: 2021-03-22 | End: 2021-08-09

## 2021-03-22 SDOH — ECONOMIC STABILITY - INCOME SECURITY: OTHER PROBLEMS RELATED TO HOUSING AND ECONOMIC CIRCUMSTANCES: Z59.89

## 2021-03-22 NOTE — ASSESSMENT & PLAN NOTE
Likely mechanical/overuse due to strenuous work plus weak joints, primarily knees  Follows w/ortho for hip pain  She doesn't want injections for knees as she gets for hips  -Tylenol or Ibuprofen as RX  -Medrol dose pack for acute pain causing lack of sleep   -Strict advice to go to PT  -Handout on exercises provided

## 2021-03-22 NOTE — PROGRESS NOTES
Assessment/Plan:    Problem List Items Addressed This Visit        Other    Fatigue     Last FBG was 139, will check A1C  Reviewed diet - poor  Counseling and handout provided  Other routine labs pending  Relevant Orders    Lipid panel    TSH, 3rd generation with Free T4 reflex    Hemoglobin N5O    Basic metabolic panel    CBC and differential    Chronic pain of both knees     Likely mechanical/overuse due to strenuous work plus weak joints, primarily knees  Follows w/ortho for hip pain  She doesn't want injections for knees as she gets for hips  -Tylenol or Ibuprofen as RX  -Medrol dose pack for acute pain causing lack of sleep   -Strict advice to go to PT  -Handout on exercises provided  Relevant Medications    methylPREDNISolone 4 MG tablet therapy pack    acetaminophen (TYLENOL) 650 mg CR tablet    ibuprofen (MOTRIN) 800 mg tablet    Does not have health insurance     Advised to speak w/fin counselor in office today  Relevant Orders    Ambulatory referral to financial counseling program    Pterygium eye, left     Beginning to "bother" patient, cause discomfort, no pain  Referral to ophthalmology  Relevant Orders    Ambulatory referral to Ophthalmology      Other Visit Diagnoses     Need for vaccination    -  Primary    Relevant Orders    influenza vaccine, quadrivalent, 0 5 mL, preservative-free, for adult and pediatric patients 6 mos+ (AFLURIA, FLUARIX, FLULAVAL, FLUZONE)            Return in about 3 months (around 6/22/2021) for Recheck  Subjective:     HPI: Kelly Melendez is a 39 y o  female who  has a past medical history of Depression, No known health problems, and Varicose veins of both lower extremities  who presented to the office today for an evaluation of bilateral leg pain  Patient works 12 hour shifts on her feet at a [employer] shop  She states she has bone pain  She denies any swelling, redness, abnormal warmth, or history of blood clots    She is not on hormonal contraception  She has not taken anything for this pain  She does, however, follow with Orthopedics and receives injections for hip pain  She expressly states she does not want injections for her leg pain/knee pain  She rates the pain as a 7 or 8/10  The pain disturbs her sleep  She denies any trauma and states this pain is chronic  The pain has however gotten worse lately because it has never caused her to not be able to sleep before  She feels like her bones throb  Of note, she has not followed up with physical therapy as ordered by the orthopedist   She has no other complaints at this time  The following portions of the patient's history were reviewed and updated as appropriate: allergies, current medications, past family history, past medical history, past social history, past surgical history, and problem list     No current outpatient medications on file prior to visit  No current facility-administered medications on file prior to visit  Review of Systems   Constitutional: Negative for fever and unexpected weight change  HENT: Negative for congestion, ear pain, rhinorrhea and sore throat  Eyes: Negative for visual disturbance  Respiratory: Negative for cough and shortness of breath  Cardiovascular: Negative for chest pain  Gastrointestinal: Negative for abdominal pain, blood in stool, constipation, diarrhea, nausea and vomiting  Endocrine: Negative  Genitourinary: Negative for dysuria and hematuria  Musculoskeletal: Positive for arthralgias  Negative for back pain  B/l knee pain, hip pain   Skin: Negative for rash  Allergic/Immunologic: Negative  Neurological: Negative for dizziness, syncope, light-headedness and headaches  Hematological: Negative  Psychiatric/Behavioral: Negative  All other systems reviewed and are negative  BMI Counseling: Body mass index is 33 25 kg/m²   The BMI is above normal  Nutrition recommendations include decreasing portion sizes, encouraging healthy choices of fruits and vegetables, decreasing fast food intake, consuming healthier snacks, limiting drinks that contain sugar, moderation in carbohydrate intake, increasing intake of lean protein, reducing intake of saturated and trans fat and reducing intake of cholesterol  Exercise recommendations include moderate physical activity 150 minutes/week, exercising 3-5 times per week and obtaining a gym membership  Objective:    /66 (BP Location: Left arm, Patient Position: Sitting, Cuff Size: Standard)   Pulse 78   Temp 98 6 °F (37 °C)   Resp 16   Wt 79 8 kg (176 lb)   LMP  (LMP Unknown)   SpO2 99%   BMI 33 25 kg/m²     Physical Exam  Vitals signs reviewed  Constitutional:       General: She is not in acute distress  Appearance: Normal appearance  HENT:      Head: Normocephalic  Right Ear: Tympanic membrane, ear canal and external ear normal  There is no impacted cerumen  Left Ear: Tympanic membrane, ear canal and external ear normal  There is no impacted cerumen  Nose: Nose normal  No congestion  Mouth/Throat:      Mouth: Mucous membranes are moist    Eyes:      Extraocular Movements: Extraocular movements intact  Conjunctiva/sclera: Conjunctivae normal       Pupils: Pupils are equal, round, and reactive to light  Neck:      Musculoskeletal: Normal range of motion and neck supple  No muscular tenderness  Cardiovascular:      Rate and Rhythm: Normal rate and regular rhythm  Pulses: Normal pulses  Heart sounds: Normal heart sounds  No murmur  No friction rub  No gallop  Pulmonary:      Effort: Pulmonary effort is normal       Breath sounds: Normal breath sounds  No wheezing  Abdominal:      General: Bowel sounds are normal       Palpations: Abdomen is soft  Tenderness: There is no abdominal tenderness  Musculoskeletal: Normal range of motion        Right knee: She exhibits no swelling and no erythema  Tenderness found  Left knee: She exhibits no swelling, no erythema and normal patellar mobility  Tenderness found  Right lower leg: No edema  Left lower leg: No edema  Comments: Positive Lachman's and anterior drawer tests left > right   Skin:     General: Skin is warm and dry  Capillary Refill: Capillary refill takes less than 2 seconds  Neurological:      General: No focal deficit present  Mental Status: She is alert and oriented to person, place, and time  Psychiatric:         Mood and Affect: Mood normal          Behavior: Behavior normal          JEANETH Louise  03/22/21  9:13 AM    Patient Instructions     Ejercicios para la rodilla   CUIDADO AMBULATORIO:   Lo que necesita saber acerca de los ejercicios para la rodilla: Los ejercicios para la rodilla ayudan a fortalecer los músculos alrededor de arechiga rodilla  Unos músculos dangelo pueden ayudar a reducir el dolor y disminuir el riesgo de sufrir amira lesión en el futuro  Los ejercicios para la rodilla también pueden ayudarlo a recuperarse después de Jonne Gong Kina Arroyo  · Empiece despacio  Estos son Myna Darren básicos  Pregúntele a arechiga médico si usted necesita acudir con un fisioterapeuta para que le indique ejercicios más avanzado  A medida que se sienta más zenia, es posible que pueda realizar más series de cada ejercicio o añadir pesas  · Deténgase si siente dolor  Es normal que sienta cierta molestia al principio  Practicar los ejercicios con regularidad ayudará a disminuir arechiga incomodidad con el paso del [place]  · Realice los [address] estevan  Ga esto para [address] se mantengan dangelo  · Entre en calor antes de hacer los ejercicios para la rodilla  Use amira bicicleta estacionaria o camine linda 5 a 10 minutos para que ashley músculos entren en calor      Cómo realizar estiramientos de la rodilla de forma oj: Siempre ejecute los ejercicios de calentamiento antes de hacer cualquier ejercicio de acondicionamiento  Ag estos ejercicios de estiramiento amira vez más después de hacer los ejercicios de fortalecimiento  Realice estos ejercicios de estiramiento entre 4 o 5 días a la semana o según se lo indicaron  · [employer], calentamiento para la pantorrilla: De frente a amira pared, coloque ambas isrrael abiertas contra la pared, o agárrese del espaldar de amira silla para mantener el equilibrio  Mantenga las rodillas ligeramente dobladas  Dé un gran paso para atrás con amira pierna  Deje arechiga otra pierna directamente debajo de usted  [address] y presione con ashley caderas hacia adelante  Sostenga el estiramiento por 30 segundos  Cambie de pierna  Repita shena ejercicio 2 o 3 veces con cada pierna  · Estiramiento de los cuádriceps de pie: [employer], coloque amira mano contra amira pared, o agárrese del espaldar de amira silla para mantener el equilibrio  Cargue el peso de arechiga cuerpo en amira pierna, flexione la rodilla de la otra pierna y tómese del tobillo  Acerque el tobillo a ashley nalgas  Sostenga el estiramiento de 30 a 60 segundos  Cambie de pierna  Repita shena ejercicio 2 o 3 veces con cada pierna  · [place]ado, estiramiento del tendón de la corva, parte posterior del muslo: Siéntese en amira superficie plana en el piso con las dos piernas enfrente de usted  No flexione ashley dedos de los pies ni los ponga en puntas  Coloque las [place] de ashley isrrael en el piso y deslice ashley isrrael hacia adelante hasta que usted sienta amira resistencia o un estiramiento leve  Debe mantener la espalda derecha  Sostenga el estiramiento por 30 segundos  Repita 2 o 3 veces  Cómo realizar ejercicios de fortalecimiento de la rodilla de manera jo: Realice estos ejercicios 4 o 5 días a la semana o según le indicaron    · Medias sentadillas de pie: Párese con los pies a la distancia de ashley hombros  Lleve arechiga espalda contra amira pared o agárrese del espaldar de amira silla para mantener el equilibrio, si es necesario  [id number] y baje unas 10 pulgadas lentamente, kandace si fuera a sentarse en amira silla  [place] de arechiga cuerpo debería encontrarse mayormente sobre ashley talones  Sostenga las sentadillas por 5 segundos, luego regresa a la posición inicial  Realice 3 series de 10 sentadillas para fortalecer los glúteos y los muslos  · De pie flexión de los músculos isquiotibiales: De frente a amira pared, coloque ambas isrrael abiertas contra la pared, o agárrese del espaldar de amira silla para mantener el equilibrio  Cargue el peso de arechiga cuerpo en amira pierna, levante otra pierna y lleve arechiga talón tan cerca de los glúteos kandace pueda  Sostenga por 5 segundos y baje arechiga pierna  Realice 2 series de 10 flexiones con cada pierna  Cristin ejercicio fortalece el músculo de la parte posterior de arechiga muslo  · De pie levantamiento de las pantorillas o gémelos: De frente a amira pared, coloque ambas isrrael abiertas contra la pared, o agárrese del espaldar de amira silla para mantener el equilibrio  Póngase de pie derecho, y no se bri hacia adelante  Coloque todo arechiga peso sobre amira jacque pierna levantando el otro pie del suelo  Levante el talón del pie que está en el piso tan alto kandace pueda y [address]  Realice 2 series de 10 levantamientos de pantorilla con cada pierna para fortalecer los músculos de la pantorilla  · Enderezar la pierna y levantarla: Acuéstese boca abajo con las piernas estiradas  [place]ruce ashley brazos enfrente de usted y descanse la frente sobre ashley brazos cruzados  Apriete el músculo de arechiga pierna y levántela tanto kandace pueda  Sostenga por 5 segundos, y luego baje arechiga pierna  Realice 2 series de 10 levantamientos con cada pierna para fortalecer ashley glúteos  · Sentado, levantamiento de pierna: Siéntese en amira silla  Despacio enderece y levante amira pierna  Contraiga el músculo de arechiga muslo y sostenga por 5 segundos  Relaje y regrese arechiga pie al piso   Realice 2 series de 10 levantamientos con cada pierna  [place] le ayuda a fortalecer el músculo anterior de arechiga muslo  Comuníquese con arechiga médico si:  · Usted tiene un nuevo dolor o el dolor CALI  · Usted tiene preguntas o inquietudes acerca de arechiga condición o cuidado  © Copyright [facility] [address] Information is for End User's use only and may not be sold, redistributed or otherwise used for commercial purposes  All illustrations and images included in CareNotes® are the copyrighted property of [employer]  or [address] [place] [address] es sólo para uso en educación  Arechiga intención no es darle un consejo médico sobre enfermedades o tratamientos  Colsulte con arechiga [place] farmacéutico antes de seguir cualquier régimen médico para saber si es seguro y efectivo para usted

## 2021-03-22 NOTE — PATIENT INSTRUCTIONS
Ejercicios para la rodilla   CUIDADO AMBULATORIO:   Lo que necesita saber acerca de los ejercicios para la rodilla: Los ejercicios para la rodilla ayudan a fortalecer los músculos alrededor de arechiga rodilla  Unos músculos dangelo pueden ayudar a reducir el dolor y disminuir el riesgo de sufrir amira lesión en el futuro  Los ejercicios para la rodilla también pueden ayudarlo a recuperarse después de [facility] Edwinio Mcardle  · Empiece despacio  Estos son Darlene Niece básicos  Pregúntele a arechiga médico si usted necesita acudir con un fisioterapeuta para que le indique ejercicios más avanzado  A medida que se sienta más zenia, es posible que pueda realizar más series de cada ejercicio o añadir pesas  · Deténgase si siente dolor  Es normal que sienta cierta molestia al principio  Practicar los ejercicios con regularidad ayudará a disminuir arechiga incomodidad con el paso del [place]  · Realice los [address] estevan  Ag esto para [address] se mantengan dangelo  · Entre en calor antes de hacer los ejercicios para la rodilla  Use amira bicicleta estacionaria o camine linda 5 a 10 minutos para que ashley músculos entren en calor  Cómo realizar estiramientos de la rodilla de forma jo: Siempre ejecute los ejercicios de calentamiento antes de hacer cualquier ejercicio de acondicionamiento  Ag estos ejercicios de estiramiento amira vez más después de hacer los ejercicios de fortalecimiento  Realice estos ejercicios de estiramiento entre 4 o 5 días a la semana o según se lo indicaron  · [employer], calentamiento para la pantorrilla: De frente a amira pared, coloque ambas isrrael abiertas contra la pared, o agárrese del espaldar de amira silla para mantener el equilibrio  Mantenga las rodillas ligeramente dobladas  Dé un gran paso para atrás con amira pierna  Deje arechiga otra pierna directamente debajo de trino [place] y presione con ashley caderas hacia adelante   Sostenga el estiramiento por 30 segundos  Cambie de pierna  Repita shena ejercicio 2 o 3 veces con cada pierna  · Estiramiento de los cuádriceps de pie: [employer], coloque amira mano contra amira pared, o agárrese del espaldar de amira silla para mantener el equilibrio  Cargue el peso de arechiga cuerpo en amira pierna, flexione la rodilla de la otra pierna y tómese del tobillo  Acerque el tobillo a ashley nalgas  Sostenga el estiramiento de 30 a 60 segundos  Cambie de pierna  Repita shena ejercicio 2 o 3 veces con cada pierna  · Sentado, estiramiento del tendón de la corva, parte posterior del muslo: Siéntese en amira superficie plana en el piso con las dos piernas enfrente de usted  No flexione ashley dedos de los pies ni los ponga en puntas  Coloque las holland de ashley isrrael en el piso y deslice ashley isrrael hacia adelante hasta que usted sienta amira resistencia o un estiramiento leve  Debe mantener la espalda derecha  Sostenga el estiramiento por 30 segundos  Repita 2 o 3 veces  Cómo realizar ejercicios de fortalecimiento de la rodilla de manera jo: Realice estos ejercicios 4 o 5 días a la semana o según le indicaron  · Medias sentadillas de pie: Párese con los pies a la distancia de ashley hombros  Lleve arechiga espalda contra amira pared o agárrese del espaldar de amira silla para mantener el equilibrio, si es necesario  [id number] y baje unas 10 pulgadas lentamente, kandace si fuera a sentarse en amira silla  [place] de arechiga cuerpo debería encontrarse mayormente sobre ashley talones  Sostenga las sentadillas por 5 segundos, luego regresa a la posición inicial  Realice 3 series de 10 sentadillas para fortalecer los glúteos y los muslos  · De pie flexión de los músculos isquiotibiales: De frente a amira pared, coloque ambas isrrael abiertas contra la pared, o agárrese del espaldar de amira silla para mantener el equilibrio  Cargue el peso de arechiga cuerpo en amira pierna, levante otra pierna y lleve arechiga talón tan cerca de los glúteos kandace pueda   Sostenga por 5 segundos y baje arechiga pierna  Realice 2 series de 10 flexiones con cada pierna  Cristin ejercicio fortalece el músculo de la parte posterior de arechiga muslo  · De pie levantamiento de las pantorillas o gémelos: De frente a amira pared, coloque ambas isrrael abiertas contra la pared, o agárrese del espaldar de amira silla para mantener el equilibrio  Póngase de pie derecho, y no se bri hacia adelante  Coloque todo arechiga peso sobre amira jacque pierna levantando el otro pie del suelo  Levante el talón del pie que está en el piso tan alto kandace pueda y [address]  Realice 2 series de 10 levantamientos de pantorilla con cada pierna para fortalecer los músculos de la pantorilla  · Enderezar la pierna y levantarla: Acuéstese boca abajo con las piernas estiradas  Cruce ashley brazos enfrente de usted y descanse la frente sobre ashley brazos cruzados  Apriete el músculo de arechiga pierna y levántela tanto kandace pueda  Sostenga por 5 segundos, y luego baje arechiga pierna  Realice 2 series de 10 levantamientos con cada pierna para fortalecer ashley glúteos  · Sentado, levantamiento de pierna: Siéntese en amira silla  Despacio enderece y levante amira pierna  Contraiga el músculo de arechiga muslo y sostenga por 5 segundos  Relaje y regrese arechiga pie al piso  Realice 2 series de 10 levantamientos con cada pierna  [place] le ayuda a fortalecer el músculo anterior de arechiga muslo  Comuníquese con arechiga médico si:  · Usted tiene un nuevo dolor o el dolor CALI  · Usted tiene preguntas o inquietudes acerca de arechiga condición o cuidado  © Copyright [address] Information is for End User's use only and may not be sold, redistributed or otherwise used for commercial purposes  All illustrations and images included in CareNotes® are the copyrighted property of A D A M [employer]  or [address] [place] [address] es sólo para uso en educación  Arechiga intención no es darle un consejo médico sobre enfermedades o tratamientos   Colsulte con arechiga Cassia Cuff o farmacéutico antes de seguir cualquier régimen médico para saber si es seguro y efectivo para usted

## 2021-03-22 NOTE — ASSESSMENT & PLAN NOTE
Last FBG was 139, will check A1C  Reviewed diet - poor  Counseling and handout provided  Other routine labs pending

## 2021-03-23 LAB
HIV 1+2 AB+HIV1 P24 AG SERPL QL IA: NORMAL
RPR SER QL: NORMAL

## 2021-03-25 ENCOUNTER — TELEPHONE (OUTPATIENT)
Dept: OBGYN CLINIC | Facility: CLINIC | Age: 46
End: 2021-03-25

## 2021-03-25 NOTE — TELEPHONE ENCOUNTER
----- Message from Ascencion Jimenez MD sent at 3/23/2021 12:55 PM EDT -----  Please let her know that her pap smear was negative      Thanks   ----- Message -----  From: Lab, Background User  Sent: 3/9/2021  11:17 PM EDT  To: Ascencion Jimenez MD

## 2021-04-12 ENCOUNTER — HOSPITAL ENCOUNTER (OUTPATIENT)
Dept: MAMMOGRAPHY | Facility: MEDICAL CENTER | Age: 46
Discharge: HOME | End: 2021-04-12

## 2021-04-12 VITALS — HEIGHT: 61 IN | BODY MASS INDEX: 33.23 KG/M2 | WEIGHT: 176 LBS

## 2021-04-12 DIAGNOSIS — Z01.419 WELL WOMAN EXAM: ICD-10-CM

## 2021-04-12 PROCEDURE — 77063 BREAST TOMOSYNTHESIS BI: CPT

## 2021-04-12 PROCEDURE — 77067 SCR MAMMO BI INCL CAD: CPT

## 2021-08-09 ENCOUNTER — OFFICE VISIT (OUTPATIENT)
Dept: FAMILY MEDICINE CLINIC | Facility: CLINIC | Age: 46
End: 2021-08-09

## 2021-08-09 VITALS
TEMPERATURE: 97.3 F | BODY MASS INDEX: 30.43 KG/M2 | RESPIRATION RATE: 18 BRPM | HEART RATE: 82 BPM | HEIGHT: 61 IN | WEIGHT: 161.2 LBS | OXYGEN SATURATION: 98 % | DIASTOLIC BLOOD PRESSURE: 64 MMHG | SYSTOLIC BLOOD PRESSURE: 122 MMHG

## 2021-08-09 DIAGNOSIS — M25.40 PAINFUL SWELLING OF JOINT: ICD-10-CM

## 2021-08-09 DIAGNOSIS — G56.01 CARPAL TUNNEL SYNDROME ON RIGHT: ICD-10-CM

## 2021-08-09 DIAGNOSIS — Z59.89 DOES NOT HAVE HEALTH INSURANCE: ICD-10-CM

## 2021-08-09 DIAGNOSIS — Z78.9 TELEPHONE LANGUAGE INTERPRETER SERVICE REQUIRED: ICD-10-CM

## 2021-08-09 DIAGNOSIS — M25.561 CHRONIC PAIN OF BOTH KNEES: Primary | ICD-10-CM

## 2021-08-09 DIAGNOSIS — M25.562 CHRONIC PAIN OF BOTH KNEES: Primary | ICD-10-CM

## 2021-08-09 DIAGNOSIS — G89.29 CHRONIC PAIN OF BOTH KNEES: Primary | ICD-10-CM

## 2021-08-09 DIAGNOSIS — M54.2 NECK PAIN: ICD-10-CM

## 2021-08-09 PROCEDURE — 99214 OFFICE O/P EST MOD 30 MIN: CPT | Performed by: NURSE PRACTITIONER

## 2021-08-09 RX ORDER — METHYLPREDNISOLONE 4 MG/1
TABLET ORAL
Qty: 21 EACH | Refills: 0 | Status: SHIPPED | OUTPATIENT
Start: 2021-08-09 | End: 2021-10-14 | Stop reason: ALTCHOICE

## 2021-08-09 RX ORDER — MELOXICAM 15 MG/1
15 TABLET ORAL DAILY
Qty: 30 TABLET | Refills: 5 | Status: SHIPPED | OUTPATIENT
Start: 2021-08-09 | End: 2021-10-14 | Stop reason: ALTCHOICE

## 2021-08-09 RX ORDER — IBUPROFEN 800 MG/1
800 TABLET ORAL EVERY 6 HOURS PRN
Qty: 30 TABLET | Refills: 0 | Status: SHIPPED | OUTPATIENT
Start: 2021-08-09 | End: 2021-10-14 | Stop reason: ALTCHOICE

## 2021-08-09 RX ORDER — ACETAMINOPHEN 500 MG
1000 TABLET ORAL EVERY 6 HOURS PRN
Qty: 90 TABLET | Refills: 3 | Status: SHIPPED | OUTPATIENT
Start: 2021-08-09

## 2021-08-09 SDOH — ECONOMIC STABILITY - INCOME SECURITY: OTHER PROBLEMS RELATED TO HOUSING AND ECONOMIC CIRCUMSTANCES: Z59.89

## 2021-08-09 NOTE — PROGRESS NOTES
Assessment/Plan:    Problem List Items Addressed This Visit        Nervous and Auditory    Carpal tunnel syndrome on right     Decreased ROM of right thumb, painful to palpation, no warmth or erythema, not likely gout, may be related to chronic joint inflammatory condition as her neck, knees, hips, feet constantly have a pain/swelling     -r/o RF  -take burst of medrol dose gil  -rest, ice, advised wear brace during work         Relevant Medications    methylPREDNISolone 4 MG tablet therapy pack       Other    Does not have health insurance    Relevant Orders    Ambulatory referral to financial counseling program    Neck pain     Obvious swelling but no thyromegaly, lymphadenopathy, or erythema     -needs PT  - to speak w/financial counselor to initiate insurance application TODAY  -TRIAL DICLOFENAC GEL   - rotate Tylenol 1000 mg and ibuprofen 800mg         Relevant Medications    meloxicam (MOBIC) 15 mg tablet    Other Relevant Orders    Ambulatory referral to Pain Management    Ambulatory referral to Orthopedic Surgery    Chronic pain of both knees - Primary     -has not followed up with Orthopedics, new referral placed today   -Rotate Tylenol 1000 mg and ibuprofen 800   -Must start PT, to speak with financial counselor today   - r/o RF w/bloodwork  - continue exercises, ice rest elevation         Relevant Medications    meloxicam (MOBIC) 15 mg tablet    acetaminophen (TYLENOL) 500 mg tablet    ibuprofen (MOTRIN) 800 mg tablet    Other Relevant Orders    Ambulatory referral to Pain Management    Ambulatory referral to Orthopedic Surgery      Other Visit Diagnoses     Painful swelling of joint        Relevant Medications    meloxicam (MOBIC) 15 mg tablet    Diclofenac Sodium (VOLTAREN) 1 %    Other Relevant Orders    RF Screen w/ Reflex to Titer    Ambulatory referral to Pain Management    Telephone  service required                Return if symptoms worsen or fail to improve      A chart review was performed and previous primary care visit notes were reviewed  All applicable imaging studies were reviewed and images were reviewed personally  All applicable laboratory studies were reviewed personally  Care everywhere review was performed if  available and all pertinent notes were reviewed  Subjective:     HPI: Chandana Chopra is a 39 y o  female who has a past medical history of Depression and Varicose veins of both lower extremities  who presented to the office today for a follow up for bilateral leg pain, hand pain, neck pain  Patient works 12 hour shifts on her feet at a [employer] shop  She states she has bone pain  She denies any swelling, redness, abnormal warmth, or history of blood clots  She is not on hormonal contraception  She has not taken anything for this pain besides the steroids prescribed at last encounter, which provided no relief  She does, however, follow with Orthopedics and received injections for hip pain  She expressly states she does not want injections for her leg pain/knee pain  She rates the pain as a 7 or 8/10  The pain disturbs her sleep  She denies any trauma and states this pain is chronic  The pain has however gotten worse lately because it has never caused her to not be able to sleep before  She feels like her bones throb  Of note, she has not followed up with physical therapy as ordered by the orthopedist     She states she has not received any follow-up from financial counselors regarding eligibility for insurance  This has been the reason she has not been able to start physical therapy        The following portions of the patient's history were reviewed and updated as appropriate: allergies, current medications, past family history, past medical history, past social history, past surgical history, and problem list     Current Outpatient Medications on File Prior to Visit   Medication Sig Dispense Refill    [DISCONTINUED] acetaminophen (TYLENOL) 650 mg CR tablet Take 1 tablet (650 mg total) by mouth every 8 (eight) hours as needed for mild pain 30 tablet 0    [DISCONTINUED] ibuprofen (MOTRIN) 800 mg tablet Take 1 tablet (800 mg total) by mouth every 6 (six) hours as needed for mild pain 30 tablet 0    [DISCONTINUED] methylPREDNISolone 4 MG tablet therapy pack Use as directed on package 21 each 0     No current facility-administered medications on file prior to visit  Review of Systems   Constitutional: Negative for fever and unexpected weight change  HENT: Negative for congestion, ear pain, rhinorrhea and sore throat  Eyes: Negative for visual disturbance  Respiratory: Negative for cough and shortness of breath  Cardiovascular: Negative for chest pain  Gastrointestinal: Negative for abdominal pain, blood in stool, constipation, diarrhea, nausea and vomiting  Endocrine: Negative  Genitourinary: Negative for dysuria and hematuria  Musculoskeletal: Positive for arthralgias, back pain, gait problem, joint swelling, myalgias, neck pain and neck stiffness  B/l knee pain, hip pain   Skin: Negative for rash  Allergic/Immunologic: Negative  Neurological: Negative for dizziness, syncope, light-headedness and headaches  Hematological: Negative  Psychiatric/Behavioral: Negative  All other systems reviewed and are negative  Objective:    /64 (BP Location: Left arm, Patient Position: Sitting, Cuff Size: Standard)   Pulse 82   Temp (!) 97 3 °F (36 3 °C) (Temporal)   Resp 18   Ht 5' 1" (1 549 m)   Wt 73 1 kg (161 lb 3 2 oz)   LMP 08/03/2021 (Exact Date)   SpO2 98%   BMI 30 46 kg/m²     Physical Exam  Vitals reviewed  Constitutional:       General: She is not in acute distress  Appearance: Normal appearance  HENT:      Head: Normocephalic  Right Ear: Tympanic membrane, ear canal and external ear normal  There is no impacted cerumen        Left Ear: Tympanic membrane, ear canal and external ear normal  There is no impacted cerumen  Nose: Nose normal  No congestion  Mouth/Throat:      Mouth: Mucous membranes are moist    Eyes:      Extraocular Movements: Extraocular movements intact  Conjunctiva/sclera: Conjunctivae normal       Pupils: Pupils are equal, round, and reactive to light  Cardiovascular:      Rate and Rhythm: Normal rate and regular rhythm  Pulses: Normal pulses  Heart sounds: Normal heart sounds  No murmur heard  No friction rub  No gallop  Pulmonary:      Effort: Pulmonary effort is normal       Breath sounds: Normal breath sounds  No wheezing  Abdominal:      General: Bowel sounds are normal       Palpations: Abdomen is soft  Tenderness: There is no abdominal tenderness  Musculoskeletal:      Right wrist: Tenderness present  No swelling  Decreased range of motion  Right hand: Tenderness present  Decreased range of motion  Decreased strength of finger abduction and wrist extension  Cervical back: Neck supple  Swelling and bony tenderness present  No erythema  No muscular tenderness  Decreased range of motion  Right knee: No swelling or erythema  Tenderness present  Left knee: No swelling or erythema  Tenderness present  Normal patellar mobility  Right lower leg: No edema  Left lower leg: No edema  Skin:     General: Skin is warm and dry  Capillary Refill: Capillary refill takes less than 2 seconds  Neurological:      General: No focal deficit present  Mental Status: She is alert and oriented to person, place, and time  Psychiatric:         Mood and Affect: Mood normal          Behavior: Behavior normal          JEANETH Stratton  08/09/21  2:04 PM    Patient Instructions     Ejercicios para la rodilla   CUIDADO AMBULATORIO:   Lo que necesita saber acerca de los ejercicios para la rodilla: Los ejercicios para la rodilla ayudan a fortalecer los músculos alrededor de arechiga rodilla   Unos músculos dangelo pueden ayudar a reducir el dolor y disminuir el riesgo de sufrir amira lesión en el futuro  Los ejercicios para la rodilla también pueden ayudarlo a recuperarse después de [facility] Afia Heath  · Empiece despacio  Estos son [place] básicos  Pregúntele a arechiga médico si usted necesita acudir con un fisioterapeuta para que le indique ejercicios más avanzado  A medida que se sienta más zenia, es posible que pueda realizar más series de cada ejercicio o añadir pesas  · Deténgase si siente dolor  Es normal que sienta cierta molestia al principio  Practicar los ejercicios con regularidad ayudará a disminuir arechiga incomodidad con el paso del Dimitri  · Realice los [address] piernas  Ag esto para [address] se mantengan dangelo  · Entre en calor antes de hacer los ejercicios para la rodilla  Use amira bicicleta estacionaria o camine linda 5 a 10 minutos para que ashley músculos entren en calor  Cómo realizar estiramientos de la rodilla de forma jo: Siempre ejecute los ejercicios de calentamiento antes de hacer cualquier ejercicio de acondicionamiento  Ag estos ejercicios de estiramiento amira vez más después de hacer los ejercicios de fortalecimiento  Realice estos ejercicios de estiramiento entre 4 o 5 días a la semana o según se lo indicaron  · [employer], calentamiento para la pantorrilla: De frente a amira pared, coloque ambas isrrael abiertas contra la pared, o agárrese del espaldar de amira silla para mantener el equilibrio  Mantenga las rodillas ligeramente dobladas  Dé un gran paso para atrás con amira pierna  Deje arechiga otra pierna directamente debajo de usted  Rocio Robbins y presione con ashley caderas hacia adelante  Sostenga el estiramiento por 30 segundos  Cambie de fabián  Repita shena ejercicio 2 o 3 veces con cada piergabriella           · Estiramiento de los cuádriceps de pie: [employer], coloque amira mano contra amira pared, o agárrese del espaldar de amira silla para mantener el equilibrio  Cargue el peso de arechiga cuerpo en amira pierna, flexione la rodilla de la otra pierna y tómese del tobillo  Acerque el tobillo a ashley nalgas  Sostenga el estiramiento de 30 a 60 segundos  Cambie de pierna  Repita shena ejercicio 2 o 3 veces con cada pierna  · Sentado, estiramiento del tendón de la corva, parte posterior del muslo: Siéntese en amira superficie plana en el piso con las dos piernas enfrente de usted  No flexione ashley dedos de los pies ni los ponga en puntas  Coloque las [place] de ashley isrrael en el piso y deslice ashley isrrael hacia adelante hasta que usted sienta amira resistencia o un estiramiento leve  Debe mantener la espalda derecha  Sostenga el estiramiento por 30 segundos  Repita 2 o 3 veces  Cómo realizar ejercicios de fortalecimiento de la rodilla de manera jo: Realice estos ejercicios 4 o 5 días a la semana o según le indicaron  · Medias sentadillas de pie: Párese con los pies a la distancia de ashley hombros  Lleve arechiga espalda contra amira pared o agárrese del espaldar de amira silla para mantener el equilibrio, si es necesario  [id number] y baje unas 10 pulgadas lentamente, kandace si fuera a sentarse en amira silla  [place] de arechiga cuerpo debería encontrarse mayormente sobre ashley talones  Sostenga las sentadillas por 5 segundos, luego regresa a la posición inicial  Realice 3 series de 10 sentadillas para fortalecer los glúteos y los muslos  · De pie flexión de los músculos isquiotibiales: De frente a amira pared, coloque ambas isrrael abiertas contra la pared, o agárrese del espaldar de amira silla para mantener el equilibrio  Cargue el peso de arechiga cuerpo en amira pierna, levante otra pierna y lleve arechiga talón tan cerca de los glúteos kandace pueda  Sostenga por 5 segundos y baje arechiga pierna  Realice 2 series de 10 flexiones con cada pierna  Shena ejercicio fortalece el músculo de la parte posterior de arechiga muslo           · De pie levantamiento de las pantorillas o gémelos: De frente a [place] pared, coloque ambas isrrael abiertas contra la pared, o agárrese del espaldar de amira silla para mantener el equilibrio  Póngase de pie derecho, y no se bri hacia adelante  Coloque todo arechiga peso sobre amira jacque pierna levantando el otro pie del suelo  Levante el talón del pie que está en el piso tan alto kandace pueda y [place]  Realice 2 series de 10 levantamientos de pantorilla con cada pierna para fortalecer los músculos de la pantorilla  · Enderezar la pierna y levantarla: Acuéstese boca abajo con las piernas estiradas  Cruce ashley brazos enfrente de usted y descanse la frente sobre ashley brazos cruzados  Apriete el músculo de arechiga pierna y levántela tanto kandace pueda  Sostenga por 5 segundos, y luego baje arechiga pierna  Realice 2 series de 10 levantamientos con cada pierna para fortalecer ashley glúteos  · Sentado, levantamiento de pierna: Siéntese en amira silla  Despacio enderece y levante amira pierna  Contraiga el músculo de arechiga muslo y sostenga por 5 segundos  Relaje y regrese arechiga pie al piso  Realice 2 series de 10 levantamientos con cada pierna  [place] le ayuda a fortalecer el músculo anterior de arechiga muslo  Comuníquese con arechiga médico si:  · Usted tiene un nuevo dolor o el dolor CALI  · Usted tiene preguntas o inquietudes acerca de arechiga condición o cuidado  © Copyright [employer] 2021 Information is for End User's use only and may not be sold, redistributed or otherwise used for commercial purposes  All illustrations and images included in CareNotes® are the copyrighted property of A D A M , [facility]  or [address] [place] [address] es sólo para uso en educación  Arechiga intención no es darle un consejo médico sobre enfermedades o tratamientos  Colsulte con arechiga Bary Irina farmacéutico antes de seguir cualquier régimen médico para saber si es seguro y efectivo para usted

## 2021-08-09 NOTE — PATIENT INSTRUCTIONS
Ejercicios para la rodilla   CUIDADO AMBULATORIO:   Lo que necesita saber acerca de los ejercicios para la rodilla: Los ejercicios para la rodilla ayudan a fortalecer los músculos alrededor de arechiga rodilla  Unos músculos dangelo pueden ayudar a reducir el dolor y disminuir el riesgo de sufrir amira lesión en el futuro  Los ejercicios para la rodilla también pueden ayudarlo a recuperarse después de [place]  · Empiece despacio  Estos son Jamila Rosmery básicos  Pregúntele a arechiga médico si usted necesita acudir con un fisioterapeuta para que le indique ejercicios más avanzado  A medida que se sienta más zenia, es posible que pueda realizar más series de cada ejercicio o añadir pesas  · Deténgase si siente dolor  Es normal que sienta cierta molestia al principio  Practicar los ejercicios con regularidad ayudará a disminuir arechiga incomodidad con el paso del Dimitri  · Realice los [address] estevan  Ag esto para [address] se mantengan dangelo  · Entre en calor antes de hacer los ejercicios para la rodilla  Use amira bicicleta estacionaria o camine linda 5 a 10 minutos para que ashley músculos entren en calor  Cómo realizar estiramientos de la rodilla de forma jo: Siempre ejecute los ejercicios de calentamiento antes de hacer cualquier ejercicio de acondicionamiento  Ag estos ejercicios de estiramiento amira vez más después de hacer los ejercicios de fortalecimiento  Realice estos ejercicios de estiramiento entre 4 o 5 días a la semana o según se lo indicaron  · [employer], calentamiento para la pantorrilla: De frente a amira pared, coloque ambas isrrael abiertas contra la pared, o agárrese del espaldar de amira silla para mantener el equilibrio  Mantenga las rodillas ligeramente dobladas  Dé un gran paso para atrás con amira pierna  Deje arechiga otra pierna directamente debajo de trino Robbins y presione con ashley caderas hacia adelante   Sostenga el estiramiento por 30 segundos  Cambie de pierna  Repita shena ejercicio 2 o 3 veces con cada pierna  · Estiramiento de los cuádriceps de pie: [employer], coloque amira mano contra amira pared, o agárrese del espaldar de amira silla para mantener el equilibrio  Cargue el peso de arechiga cuerpo en amira pierna, flexione la rodilla de la otra pierna y tómese del tobillo  Acerque el tobillo a ashley nalgas  Sostenga el estiramiento de 30 a 60 segundos  Cambie de pierna  Repita shena ejercicio 2 o 3 veces con cada pierna  · Sentado, estiramiento del tendón de la corva, parte posterior del muslo: Siéntese en amira superficie plana en el piso con las dos piernas enfrente de usted  No flexione ashley dedos de los pies ni los ponga en puntas  Coloque las holland de ashley isrrael en el piso y deslice ashley isrrael hacia adelante hasta que usted sienta amira resistencia o un estiramiento leve  Debe mantener la espalda derecha  Sostenga el estiramiento por 30 segundos  Repita 2 o 3 veces  Cómo realizar ejercicios de fortalecimiento de la rodilla de manera jo: Realice estos ejercicios 4 o 5 días a la semana o según le indicaron  · Medias sentadillas de pie: Párese con los pies a la distancia de ashley hombros  Lleve arechiga espalda contra amira pared o agárrese del espaldar de amira silla para mantener el equilibrio, si es necesario  [id number] y baje unas 10 pulgadas lentamente, kandace si fuera a sentarse en amira silla  [place] de arechiga cuerpo debería encontrarse mayormente sobre ashley talones  Sostenga las sentadillas por 5 segundos, luego regresa a la posición inicial  Realice 3 series de 10 sentadillas para fortalecer los glúteos y los muslos  · De pie flexión de los músculos isquiotibiales: De frente a amira pared, coloque ambas isrrael abiertas contra la pared, o agárrese del espaldar de amira silla para mantener el equilibrio  Cargue el peso de arechiga cuerpo en amira pierna, levante otra pierna y lleve arechiga talón tan cerca de los glúteos kandace pueda   Sostenga por 5 segundos y baje arechiga pierna  Realice 2 series de 10 flexiones con cada pierna  [place]ristin ejercicio fortalece el músculo de la parte posterior de arechiga muslo  · De pie levantamiento de las pantorillas o gémelos: De frente a amira pared, coloque ambas isrrael abiertas contra la pared, o agárrese del espaldar de amira silla para mantener el equilibrio  Póngase de pie derecho, y no se bri hacia adelante  Coloque todo arechiga peso sobre amira jacque pierna levantando el otro pie del suelo  Levante el talón del pie que está en el piso tan alto kandace adrianaeda y [place]  Realice 2 series de 10 levantamientos de pantorilla con cada pierna para fortalecer los músculos de la pantorilla  · Enderezar la pierna y levantarla: Acuéstese boca abajo con las piernas estiradas  Cruce ashley brazos enfrente de usted y descanse la frente sobre ashley brazos cruzados  Apriete el músculo de arechiga pierna y levántela tanto kandace pueda  Sostenga por 5 segundos, y luego baje arechiga pierna  Realice 2 series de 10 levantamientos con cada pierna para fortalecer ashley glúteos  · Sentado, levantamiento de pierna: Siéntese en amira silla  Despacio enderece y levante amira pierna  Contraiga el músculo de arechiga muslo y sostenga por 5 segundos  Relaje y regrese arechiga pie al piso  Realice 2 series de 10 levantamientos con cada pierna  [place] le ayuda a fortalecer el músculo anterior de arechiga muslo  Comuníquese con arechiga médico si:  · Usted tiene un nuevo dolor o el dolor CALI  · Usted tiene preguntas o inquietudes acerca de arechiga condición o cuidado  © Copyright [employer] 2021 Information is for End User's use only and may not be sold, redistributed or otherwise used for commercial purposes  All illustrations and images included in CareNotes® are the copyrighted property of [employer]  or [address] [place] [address] es sólo para uso en educación  Arechiga intención no es darle un consejo médico sobre enfermedades o tratamientos   Colsulte con arechiga Irven Ruby o farmacéutico antes de seguir cualquier régimen médico para saber si es seguro y efectivo para usted

## 2021-08-09 NOTE — ASSESSMENT & PLAN NOTE
Decreased ROM of right thumb, painful to palpation, no warmth or erythema, not likely gout, may be related to chronic joint inflammatory condition as her neck, knees, hips, feet constantly have a pain/swelling     -r/o RF  -take burst of medrol dose gil  -rest, ice, advised wear brace during work

## 2021-08-09 NOTE — ASSESSMENT & PLAN NOTE
Obvious swelling but no thyromegaly, lymphadenopathy, or erythema     -needs PT  - to speak w/financial counselor to initiate insurance application TODAY  -TRIAL DICLOFENAC GEL   - rotate Tylenol 1000 mg and ibuprofen 800mg

## 2021-08-09 NOTE — ASSESSMENT & PLAN NOTE
-has not followed up with Orthopedics, new referral placed today   -Rotate Tylenol 1000 mg and ibuprofen 800   -Must start PT, to speak with financial counselor today   - r/o RF w/bloodwork  - continue exercises, ice rest elevation

## 2021-09-08 ENCOUNTER — OFFICE VISIT (OUTPATIENT)
Dept: OBGYN CLINIC | Facility: MEDICAL CENTER | Age: 46
End: 2021-09-08

## 2021-09-08 ENCOUNTER — APPOINTMENT (OUTPATIENT)
Dept: RADIOLOGY | Facility: MEDICAL CENTER | Age: 46
End: 2021-09-08

## 2021-09-08 ENCOUNTER — APPOINTMENT (OUTPATIENT)
Dept: LAB | Facility: MEDICAL CENTER | Age: 46
End: 2021-09-08

## 2021-09-08 VITALS
HEART RATE: 73 BPM | WEIGHT: 160 LBS | DIASTOLIC BLOOD PRESSURE: 71 MMHG | BODY MASS INDEX: 30.23 KG/M2 | SYSTOLIC BLOOD PRESSURE: 107 MMHG

## 2021-09-08 DIAGNOSIS — M79.641 BILATERAL HAND PAIN: ICD-10-CM

## 2021-09-08 DIAGNOSIS — M25.562 CHRONIC PAIN OF BOTH KNEES: ICD-10-CM

## 2021-09-08 DIAGNOSIS — M25.561 CHRONIC PAIN OF BOTH KNEES: Primary | ICD-10-CM

## 2021-09-08 DIAGNOSIS — M25.40 PAINFUL SWELLING OF JOINT: ICD-10-CM

## 2021-09-08 DIAGNOSIS — G89.29 CHRONIC PAIN OF BOTH KNEES: ICD-10-CM

## 2021-09-08 DIAGNOSIS — M79.642 BILATERAL HAND PAIN: ICD-10-CM

## 2021-09-08 DIAGNOSIS — M25.562 CHRONIC PAIN OF BOTH KNEES: Primary | ICD-10-CM

## 2021-09-08 DIAGNOSIS — M25.561 CHRONIC PAIN OF BOTH KNEES: ICD-10-CM

## 2021-09-08 DIAGNOSIS — M54.2 NECK PAIN: ICD-10-CM

## 2021-09-08 DIAGNOSIS — G89.29 CHRONIC PAIN OF BOTH KNEES: Primary | ICD-10-CM

## 2021-09-08 DIAGNOSIS — M25.50 POLYARTHRALGIA: ICD-10-CM

## 2021-09-08 LAB
CRP SERPL QL: <3 MG/L
ERYTHROCYTE [SEDIMENTATION RATE] IN BLOOD: 19 MM/HOUR (ref 0–19)
RHEUMATOID FACT SER QL LA: NEGATIVE

## 2021-09-08 PROCEDURE — 73564 X-RAY EXAM KNEE 4 OR MORE: CPT

## 2021-09-08 PROCEDURE — 36415 COLL VENOUS BLD VENIPUNCTURE: CPT

## 2021-09-08 PROCEDURE — 86618 LYME DISEASE ANTIBODY: CPT

## 2021-09-08 PROCEDURE — 86140 C-REACTIVE PROTEIN: CPT

## 2021-09-08 PROCEDURE — 99213 OFFICE O/P EST LOW 20 MIN: CPT | Performed by: EMERGENCY MEDICINE

## 2021-09-08 PROCEDURE — 85652 RBC SED RATE AUTOMATED: CPT

## 2021-09-08 PROCEDURE — 86430 RHEUMATOID FACTOR TEST QUAL: CPT

## 2021-09-08 PROCEDURE — 86200 CCP ANTIBODY: CPT

## 2021-09-08 PROCEDURE — 86038 ANTINUCLEAR ANTIBODIES: CPT

## 2021-09-08 NOTE — PROGRESS NOTES
Assessment/Plan:    Diagnoses and all orders for this visit:    Chronic pain of both knees  -     XR knee 4+ vw right injury; Future  -     XR knee 4+ vw left injury; Future  -     Ambulatory referral to Orthopedic Surgery  -     C-reactive protein; Future  -     FILOMENA Screen w/ Reflex to Titer/Pattern; Future  -     Rheumatoid Factor; Future  -     Cyclic citrul peptide antibody, IgG; Future  -     Lyme Antibody Profile with reflex to WB; Future  -     Ambulatory referral to Rheumatology; Future  -     Sedimentation rate, automated; Future    Neck pain  -     Ambulatory referral to Orthopedic Surgery  -     C-reactive protein; Future  -     FILOMENA Screen w/ Reflex to Titer/Pattern; Future  -     Rheumatoid Factor; Future  -     Cyclic citrul peptide antibody, IgG; Future  -     Lyme Antibody Profile with reflex to WB; Future  -     Ambulatory referral to Rheumatology; Future  -     Sedimentation rate, automated; Future    Bilateral hand pain  -     C-reactive protein; Future  -     FILOMENA Screen w/ Reflex to Titer/Pattern; Future  -     Rheumatoid Factor; Future  -     Cyclic citrul peptide antibody, IgG; Future  -     Lyme Antibody Profile with reflex to WB; Future  -     Ambulatory referral to Rheumatology; Future  -     Sedimentation rate, automated; Future    Polyarthralgia  -     C-reactive protein; Future  -     FILOMENA Screen w/ Reflex to Titer/Pattern; Future  -     Rheumatoid Factor; Future  -     Cyclic citrul peptide antibody, IgG; Future  -     Lyme Antibody Profile with reflex to WB; Future  -     Ambulatory referral to Rheumatology; Future  -     Sedimentation rate, automated; Future    Patient presents with chronic polyarthralgia with symptoms concerning for systemic etiologies, will check screening lab work and refer to Rheum given her clinical picture  Return if symptoms worsen or fail to improve      Chief Complaint:     Chief Complaint   Patient presents with    Left Knee - Pain    Right Knee - Pain Subjective:   Patient ID: Carlos Vincent is a 55 y o  female  Patient returns to the office referred for b/l knee and neck pain  She also notes diffuse pains, yohan b/l hands including PIP joints and mostly now her right thumb  Symptoms worse in morning  Hx in  neg lab work screening for autoimmune and lyme  She did undergo Fluoroscopic guidance right hip steroid injection performed 2/10/21    Previous note 21: Patient returns with mild improvement of the right foot, however still with right groin pain  She is unable to start PT as she does not have insurance  She does get benefit from meloxicam   She works in [employer] and does a lot of standing  Her right heel pain is sharp, usually worse with first couple steps after sitting or out of bed  No improvement with steroid injection        Review of Systems    The following portions of the patient's chart were reviewed and updated as appropriate:    Allergy:  No Known Allergies      Past Medical History:   Diagnosis Date    Depression     No known health problems     Varicose veins of both lower extremities        Past Surgical History:   Procedure Laterality Date     SECTION      FL INJECTION RIGHT HIP (NON ARTHROGRAM)  2/10/2021    HERNIA REPAIR      OVARIAN CYST SURGERY         Social History     Socioeconomic History    Marital status: Single     Spouse name: Not on file    Number of children: Not on file    Years of education: Not on file    Highest education level: Not on file   Occupational History    Not on file   Tobacco Use    Smoking status: Never Smoker    Smokeless tobacco: Never Used   Vaping Use    Vaping Use: Never used   Substance and Sexual Activity    Alcohol use: No    Drug use: No    Sexual activity: Not Currently     Partners: Male   Other Topics Concern    Not on file   Social History Narrative    No preference on Hindu beliefs     Social Determinants of Health     Financial Resource Strain:     Difficulty of Paying Living Expenses:    Food Insecurity:     Worried About Running Out of Food in the Last Year:     [address] in the Last Year:    Transportation Needs:     Lack of Transportation (Medical):      Lack of Transportation (Non-Medical):    Physical Activity:     Days of Exercise per Week:     Minutes of Exercise per Session:    Stress:     Feeling of Stress :    Social Connections:     Frequency of Communication with Friends and Family:     Frequency of Social Gatherings with Friends and Family:     Attends Roman [employer] Services:     Active Member of Clubs or Organizations:     Attends Club or Organization Meetings:     Marital Status:    Intimate Partner Violence:     Fear of Current or Ex-Partner:     Emotionally Abused:     Physically Abused:     Sexually Abused:        Family History   Problem Relation Age of Onset    Diabetes Other     Hyperlipidemia Other         pure    Diabetes Mother     Heart failure Father     No Known Problems Sister     No Known Problems Brother     No Known Problems Daughter     No Known Problems Brother     No Known Problems Brother     No Known Problems Brother     No Known Problems Brother     No Known Problems Son     No Known Problems Maternal Aunt     No Known Problems Maternal Aunt     No Known Problems Maternal Aunt     No Known Problems Paternal Aunt     No Known Problems Paternal Aunt        Medications:    Current Outpatient Medications:     acetaminophen (TYLENOL) 500 mg tablet, Take 2 tablets (1,000 mg total) by mouth every 6 (six) hours as needed for moderate pain, Disp: 90 tablet, Rfl: 3    Diclofenac Sodium (VOLTAREN) 1 %, Apply 2 g topically 4 (four) times a day, Disp: 100 g, Rfl: 2    ibuprofen (MOTRIN) 800 mg tablet, Take 1 tablet (800 mg total) by mouth every 6 (six) hours as needed for mild pain, Disp: 30 tablet, Rfl: 0    meloxicam (MOBIC) 15 mg tablet, Take 1 tablet (15 mg total) by mouth daily (Patient not taking: Reported on 9/8/2021), Disp: 30 tablet, Rfl: 5    methylPREDNISolone 4 MG tablet therapy pack, Use as directed on package (Patient not taking: Reported on 9/8/2021), Disp: 21 each, Rfl: 0    Patient Active Problem List   Diagnosis    Vitamin D deficiency    Headache    Fatigue    Neck pain    Vertigo    Chronic pain of both knees    Does not have health insurance    Pterygium eye, left    Carpal tunnel syndrome on right       Objective:  /71   Pulse 73   Wt 72 6 kg (160 lb)   BMI 30 23 kg/m²     Right Hand Exam     Comments:  Tenderness APL and EPB as well as base of 1st MC and MCP joint no erythema            Physical Exam  Vitals reviewed  Constitutional:       Appearance: She is well-developed  HENT:      Head: Normocephalic and atraumatic  Eyes:      Conjunctiva/sclera: Conjunctivae normal    Cardiovascular:      Rate and Rhythm: Normal rate  Pulmonary:      Effort: Pulmonary effort is normal  No respiratory distress  Musculoskeletal:      Cervical back: Normal range of motion and neck supple  Skin:     General: Skin is warm and dry  Neurological:      Mental Status: She is alert and oriented to person, place, and time  Psychiatric:         Behavior: Behavior normal            Neurologic Exam     Mental Status   Oriented to person, place, and time  Procedures    I have personally reviewed pertinent films in PACS

## 2021-09-08 NOTE — PATIENT INSTRUCTIONS
Artralgia   LO QUE NECESITA SABER:   La artralgia es dolor en amira o más articulaciones, sebastian sin inflamación  El dolor podría ser de corta duración y mejorar dentro de 6 a 8 semanas  La artralgia puede ser amira señal temprana de artritis  La artralgia puede ser causada por amira condición médica kandace un trastorno hormonal o un tumor  Además podría ser la consecuencia de amira infección o [place] [employer]  INSTRUCCIONES SOBRE EL DINA HOSPITALARIA:   Medicamentos: A usted le pueden  prescribir los siguientes medicamentos:  · Acetaminofén more el dolor  Pregunte la cantidad y la frecuencia con que debe tomarlos  [address]  El acetaminofén puede causar daño en el hígado cuando no se javy de forma correcta  · ZEINAB disminuyen el dolor y previenen la inflamación  Consulte con arechiga médico cuál medicamento es el adecuado para usted  Pregunte cuánto nahun y cuándo  Tómelos kandace se le indique  Cuando no se viola de la [employer], los medicamentos antiinflamatorios no esteroides pueden causar sangrado estomacal y problemas renales  · La crema para el alivio del dolor more el dolor  [address]  · [place] medicamentos kandace se le haya indicado  Consulte con arechiga médico si usted christine que arechiga medicamento no le está ayudando o si presenta efectos secundarios  Infórmele si es alérgico a algún medicamento  Mantenga amira lista actualizada de los [place], las vitaminas y los productos herbales que javy  Incluya los siguientes datos de los medicamentos: cantidad, frecuencia y motivo de administración  Traiga con usted la lista o los envases de las píldoras a ashley citas de seguimiento  Lleve la lista de los medicamentos con usted en cornelio de amira emergencia  Ag amira jenna de control con arechiga médico o especialista kandace se lo indicaron: [place] preguntas para que se acuerde de hacerlas linda ashley visitas  Cuidados personales:  · Aplique calor para ayudar a reducir el dolor   Use amira compresa o envoltura caliente  Aplíquese calor de 20 a 30 minutos cada 2 horas linda los días que le indiquen  · Descanse lo más posible  Evite actividades que causan [employer] articulaciones  · Aplique hielo para ayudar a bajar la inflamación y el dolor  El hielo también puede contribuir a evitar el daño de los tejidos  Use amira compresa de hielo o ponga hielo triturado en amira bolsa de plástico  Rachna Grumet con amira toalla y póngasela en la articulación adolorida cada hora linda 15 o 20 minutos o según se le haya indicado  · Apoye la articulación con amira férula o envoltura elástica según indicaciones  · Eleve la articulación de [employer] que quede por encima del nivel de arechiga corazón tan seguido kandace pueda para ayudar a reducir la inflamación y el dolor  Use almohadas o cobijas dobladas para elevar la articulación de forma cómoda  · Pierda peso si tiene sobrepeso  El peso extra puede ejercer presión sobre aslhey articulaciones y causarle más dolor  Consulte con arechiga médico cuánto debería pesar  También pídale que le ayude a diseñar un buen plan de pérdida de peso  · El ejercicio con regularidad para ayudar a mejorar el movimiento de las articulaciones y disminuir el dolor  Pida más información acerca de un plan de ejercicio adecuado para usted  Los ejercicios de bajo impacto pueden ayudar a quitar algo de la presión en las articulaciones  Francesca Sep de ejercicios de bajo impacto son caminar, nadar y practicar aeróbicos acuáticos  Fisioterapia: Un fisioterapeuta le puede enseñar ejercicios para ayudarle a mejorar el movimiento y la fuerza, y para disminuir el dolor  Pregúntele a arechiga médico si la fisioterapia es apropiada para usted  Comuníquese con arechiga especialista o médico sí:  · Tiene fiebre  · Usted continúa sintiendo dolor en las articulaciones y el dolor no se le more con calor, hielo o medicamento  · Usted tiene dolor e inflamación alrededor de la articulación      · Usted tiene preguntas o inquietudes acerca de arechiga condición o cuidado  Regrese a la mary lou de emergencias si:  · Usted de repente siente un dolor severo cuando mueve la articulación  · Usted tiene fiebre y escalofríos dangelo  · Usted no puede  la articulación  · Usted pierde sensibilidad en el lado del cuerpo donde está la articulación adolorida  © Copyright [address] 2021 Information is for End User's use only and may not be sold, redistributed or otherwise used for commercial purposes  All illustrations and images included in CareNotes® are the copyrighted property of A D A M , Inc  or [address] [place] [address] es sólo para uso en educación  Arechiga intención no es darle un consejo médico sobre enfermedades o tratamientos  Colsulte con arechiga Catherne Alejo farmacéutico antes de seguir cualquier régimen médico para saber si es seguro y efectivo para usted

## 2021-09-09 LAB — B BURGDOR IGG+IGM SER-ACNC: -17

## 2021-09-10 LAB
CCP AB SER IA-ACNC: 1.2
RYE IGE QN: NEGATIVE

## 2021-09-27 ENCOUNTER — CLINICAL SUPPORT (OUTPATIENT)
Dept: DENTISTRY | Facility: CLINIC | Age: 46
End: 2021-09-27

## 2021-09-27 VITALS — DIASTOLIC BLOOD PRESSURE: 74 MMHG | TEMPERATURE: 97.3 F | HEART RATE: 77 BPM | SYSTOLIC BLOOD PRESSURE: 120 MMHG

## 2021-09-27 DIAGNOSIS — Z01.20 ENCOUNTER FOR DENTAL EXAMINATION: Primary | ICD-10-CM

## 2021-09-27 PROCEDURE — D1110 PROPHYLAXIS - ADULT: HCPCS | Performed by: DENTAL HYGIENIST

## 2021-09-27 PROCEDURE — D0274 BITEWINGS - 4 RADIOGRAPHIC IMAGES: HCPCS | Performed by: DENTAL HYGIENIST

## 2021-09-27 PROCEDURE — D0120 PERIODIC ORAL EVALUATION - ESTABLISHED PATIENT: HCPCS | Performed by: DENTAL HYGIENIST

## 2021-09-27 PROCEDURE — D1330 ORAL HYGIENE INSTRUCTIONS: HCPCS | Performed by: DENTAL HYGIENIST

## 2021-09-27 NOTE — PROGRESS NOTES
Adult Prophy     Exams:  Periodic exam and perio charted  Xrays:   4  BWX    Type of Treatment:  Adult Prophy - used Ultrasonic and Hand scaling,  Polished, Flossed  Reviewed OHI  Brush:  2X/day and Floss 1X/day  Recommended Listerine  / ACT  mouth rinses  EO/OCS Exams:  No significant findings  IO: No significant findings  Occlusion:   Class I  Oral Hygiene:  Fair   Plaque:  Light    Calculus:  Light  / Moderate  Sub and supra  Bleeding:  Light  / Moderate   Gingiva:    Red  / Spongy /  Inflamed  Stain:  nonePerio Charting:  Periocharting was completed and evaluated  1-5  mm w/  Lt to mod BUP   Perio Findings:  Chronic Moderate Gingivitis  Caries Findings:  #5 - D (W),  #19 - D decay at margin of crown - pt was informed that crown needs to be removed in order to get all the decay  Weingarten will be destroyed in the process and she will need a new crown  Will pre-auth # 19 crown    Caries Risk Assessment:  Moderate caries risk    Treatment Plan:  Updated    Dr  Exam:  Dr Elysia Alba  Referral:  No referral given  NV1:  Crown prep and imp #19 - 90 min  NV2:  6  Month Recall

## 2021-10-14 ENCOUNTER — OFFICE VISIT (OUTPATIENT)
Dept: RHEUMATOLOGY | Facility: CLINIC | Age: 46
End: 2021-10-14

## 2021-10-14 VITALS
HEART RATE: 80 BPM | DIASTOLIC BLOOD PRESSURE: 77 MMHG | BODY MASS INDEX: 29.98 KG/M2 | WEIGHT: 158.8 LBS | SYSTOLIC BLOOD PRESSURE: 119 MMHG | HEIGHT: 61 IN

## 2021-10-14 DIAGNOSIS — M54.2 NECK PAIN: ICD-10-CM

## 2021-10-14 DIAGNOSIS — M25.561 CHRONIC PAIN OF BOTH KNEES: ICD-10-CM

## 2021-10-14 DIAGNOSIS — M25.562 CHRONIC PAIN OF BOTH KNEES: ICD-10-CM

## 2021-10-14 DIAGNOSIS — G89.29 CHRONIC PAIN OF BOTH KNEES: ICD-10-CM

## 2021-10-14 DIAGNOSIS — M25.50 POLYARTHRALGIA: Primary | ICD-10-CM

## 2021-10-14 DIAGNOSIS — M79.642 BILATERAL HAND PAIN: ICD-10-CM

## 2021-10-14 DIAGNOSIS — M79.641 BILATERAL HAND PAIN: ICD-10-CM

## 2021-10-14 PROCEDURE — 99203 OFFICE O/P NEW LOW 30 MIN: CPT | Performed by: INTERNAL MEDICINE

## 2021-10-14 RX ORDER — CYCLOBENZAPRINE HCL 10 MG
10 TABLET ORAL
Qty: 30 TABLET | Refills: 3 | Status: SHIPPED | OUTPATIENT
Start: 2021-10-14

## 2021-10-14 RX ORDER — NAPROXEN 500 MG/1
500 TABLET ORAL 2 TIMES DAILY WITH MEALS
Qty: 60 TABLET | Refills: 3 | Status: SHIPPED | OUTPATIENT
Start: 2021-10-14

## 2021-10-15 ENCOUNTER — APPOINTMENT (OUTPATIENT)
Dept: RADIOLOGY | Facility: MEDICAL CENTER | Age: 46
End: 2021-10-15

## 2021-10-15 ENCOUNTER — APPOINTMENT (OUTPATIENT)
Dept: LAB | Facility: MEDICAL CENTER | Age: 46
End: 2021-10-15

## 2021-10-15 DIAGNOSIS — M79.641 BILATERAL HAND PAIN: ICD-10-CM

## 2021-10-15 DIAGNOSIS — M79.642 BILATERAL HAND PAIN: ICD-10-CM

## 2021-10-15 LAB
25(OH)D3 SERPL-MCNC: 11.7 NG/ML (ref 30–100)
T4 FREE SERPL-MCNC: 1.08 NG/DL (ref 0.76–1.46)
TSH SERPL DL<=0.05 MIU/L-ACNC: 4.23 UIU/ML (ref 0.36–3.74)

## 2021-10-15 PROCEDURE — 36415 COLL VENOUS BLD VENIPUNCTURE: CPT | Performed by: INTERNAL MEDICINE

## 2021-10-15 PROCEDURE — 86664 EPSTEIN-BARR NUCLEAR ANTIGEN: CPT | Performed by: INTERNAL MEDICINE

## 2021-10-15 PROCEDURE — 82306 VITAMIN D 25 HYDROXY: CPT | Performed by: INTERNAL MEDICINE

## 2021-10-15 PROCEDURE — 86665 EPSTEIN-BARR CAPSID VCA: CPT | Performed by: INTERNAL MEDICINE

## 2021-10-15 PROCEDURE — 86663 EPSTEIN-BARR ANTIBODY: CPT | Performed by: INTERNAL MEDICINE

## 2021-10-15 PROCEDURE — 73130 X-RAY EXAM OF HAND: CPT

## 2021-10-15 PROCEDURE — 84443 ASSAY THYROID STIM HORMONE: CPT | Performed by: INTERNAL MEDICINE

## 2021-10-15 PROCEDURE — 84439 ASSAY OF FREE THYROXINE: CPT | Performed by: INTERNAL MEDICINE

## 2021-10-16 LAB
EBV NA IGG SER IA-ACNC: 106 U/ML (ref 0–17.9)
EBV VCA IGG SER IA-ACNC: 529 U/ML (ref 0–17.9)
EBV VCA IGM SER IA-ACNC: <36 U/ML (ref 0–35.9)
INTERPRETATION: ABNORMAL

## 2021-10-26 DIAGNOSIS — E55.9 VITAMIN D DEFICIENCY: Primary | ICD-10-CM

## 2021-10-26 RX ORDER — ERGOCALCIFEROL 1.25 MG/1
50000 CAPSULE ORAL WEEKLY
Qty: 12 CAPSULE | Refills: 1 | Status: SHIPPED | OUTPATIENT
Start: 2021-10-26 | End: 2022-01-04

## 2021-11-15 ENCOUNTER — OFFICE VISIT (OUTPATIENT)
Dept: FAMILY MEDICINE CLINIC | Facility: CLINIC | Age: 46
End: 2021-11-15

## 2021-11-15 VITALS
OXYGEN SATURATION: 98 % | SYSTOLIC BLOOD PRESSURE: 124 MMHG | RESPIRATION RATE: 18 BRPM | HEART RATE: 72 BPM | BODY MASS INDEX: 29.45 KG/M2 | WEIGHT: 156 LBS | DIASTOLIC BLOOD PRESSURE: 76 MMHG | HEIGHT: 61 IN | TEMPERATURE: 97.8 F

## 2021-11-15 DIAGNOSIS — E55.9 VITAMIN D DEFICIENCY: ICD-10-CM

## 2021-11-15 DIAGNOSIS — E03.8 SUBCLINICAL HYPOTHYROIDISM: Primary | ICD-10-CM

## 2021-11-15 DIAGNOSIS — G56.01 CARPAL TUNNEL SYNDROME ON RIGHT: ICD-10-CM

## 2021-11-15 PROCEDURE — 99214 OFFICE O/P EST MOD 30 MIN: CPT | Performed by: NURSE PRACTITIONER

## 2021-11-15 RX ORDER — METHYLPREDNISOLONE 4 MG/1
TABLET ORAL
Qty: 21 EACH | Refills: 0 | Status: SHIPPED | OUTPATIENT
Start: 2021-11-15 | End: 2022-04-11

## 2021-11-15 RX ORDER — LEVOTHYROXINE SODIUM 0.03 MG/1
25 TABLET ORAL
Qty: 90 TABLET | Refills: 3 | Status: SHIPPED | OUTPATIENT
Start: 2021-11-15

## 2021-12-30 ENCOUNTER — APPOINTMENT (OUTPATIENT)
Dept: LAB | Facility: CLINIC | Age: 46
End: 2021-12-30

## 2021-12-30 DIAGNOSIS — E55.9 VITAMIN D DEFICIENCY: ICD-10-CM

## 2021-12-30 DIAGNOSIS — E03.8 SUBCLINICAL HYPOTHYROIDISM: ICD-10-CM

## 2021-12-30 LAB
25(OH)D3 SERPL-MCNC: 56 NG/ML (ref 30–100)
TSH SERPL DL<=0.05 MIU/L-ACNC: 2.58 UIU/ML (ref 0.36–3.74)

## 2021-12-30 PROCEDURE — 84443 ASSAY THYROID STIM HORMONE: CPT

## 2021-12-30 PROCEDURE — 36415 COLL VENOUS BLD VENIPUNCTURE: CPT

## 2021-12-30 PROCEDURE — 82306 VITAMIN D 25 HYDROXY: CPT

## 2022-04-06 ENCOUNTER — OFFICE VISIT (OUTPATIENT)
Dept: DENTISTRY | Facility: CLINIC | Age: 47
End: 2022-04-06

## 2022-04-06 VITALS — DIASTOLIC BLOOD PRESSURE: 81 MMHG | TEMPERATURE: 98.2 F | SYSTOLIC BLOOD PRESSURE: 120 MMHG | HEART RATE: 61 BPM

## 2022-04-06 DIAGNOSIS — Z01.20 ENCOUNTER FOR DENTAL EXAMINATION: Primary | ICD-10-CM

## 2022-04-06 PROCEDURE — D0120 PERIODIC ORAL EVALUATION - ESTABLISHED PATIENT: HCPCS | Performed by: DENTIST

## 2022-04-06 PROCEDURE — D1110 PROPHYLAXIS - ADULT: HCPCS | Performed by: DENTAL HYGIENIST

## 2022-04-06 NOTE — PROGRESS NOTES
Dental procedures in this visit     - PERIODIC ORAL EVALUATION - ESTABLISHED PATIENT (Completed)     Service provider: Eliseo Man DMD     Billing provider: Eliseo Man DMD     - PROPHYLAXIS - ADULT (Completed)     Service provider: Malachi Guzmán     Billing provider: Eliseo Man DMD     Subjective   Patient ID: Kelly Melendez is a 55 y o  female  Chief Complaint   Patient presents with    Routine Oral Cleaning    Periodic Exam     ASA  I;  Reviewed M/DH  Adult Prophy     Exams:  Periodic exam   Xrays:     none  Type of Treatment:  Adult Prophy - used Ultrasonic and Hand scaling,  Polished, Flossed  Reviewed OHI  Brush:  2X/day and Floss 1X/day  Recommended Listerine  / ACT  mouth rinses  EO/OCS Exams:  No significant findings  IO: No significant findings  Occlusion:  Class I  Oral Hygiene:  Good / Fair  Plaque:  Light   Calculus:  Light  / Moderate    Bleeding:  Light    Gingiva:  Pink  / Firm  /  Red  /   Inflamed  Stain:   none  Perio Charting:  Periocharting was not completed  Probe at next recall  Perio Findings:  Mild gingivitis  Localized Mild Perio  Caries Findings: No decay  Open margin on distal of #19 crown - not too different from previous x-rays - needs to be redone but there is not a hanna    Caries Risk Assessment:    Moderate caries risk    Treatment Plan:  Updated    Dr  Exam:  Dr Johanny Trujillo  Referral:  No referral given   Nv1:  6mrc w/ BW's and FMP - need 60 min

## 2022-04-11 ENCOUNTER — OFFICE VISIT (OUTPATIENT)
Dept: FAMILY MEDICINE CLINIC | Facility: CLINIC | Age: 47
End: 2022-04-11

## 2022-04-11 ENCOUNTER — APPOINTMENT (OUTPATIENT)
Dept: LAB | Facility: CLINIC | Age: 47
End: 2022-04-11
Payer: COMMERCIAL

## 2022-04-11 VITALS
RESPIRATION RATE: 18 BRPM | SYSTOLIC BLOOD PRESSURE: 116 MMHG | HEART RATE: 85 BPM | OXYGEN SATURATION: 98 % | HEIGHT: 61 IN | DIASTOLIC BLOOD PRESSURE: 72 MMHG | BODY MASS INDEX: 30.58 KG/M2 | WEIGHT: 162 LBS | TEMPERATURE: 97.8 F

## 2022-04-11 DIAGNOSIS — G56.02 CARPAL TUNNEL SYNDROME OF LEFT WRIST: Primary | ICD-10-CM

## 2022-04-11 DIAGNOSIS — E03.8 SUBCLINICAL HYPOTHYROIDISM: ICD-10-CM

## 2022-04-11 DIAGNOSIS — E01.0 THYROMEGALY: ICD-10-CM

## 2022-04-11 DIAGNOSIS — L50.9 URTICARIA: ICD-10-CM

## 2022-04-11 LAB
25(OH)D3 SERPL-MCNC: 32.1 NG/ML (ref 30–100)
CHOLEST SERPL-MCNC: 214 MG/DL
EST. AVERAGE GLUCOSE BLD GHB EST-MCNC: 117 MG/DL
HBA1C MFR BLD: 5.7 %
HDLC SERPL-MCNC: 84 MG/DL
LDLC SERPL CALC-MCNC: 119 MG/DL (ref 0–100)
NONHDLC SERPL-MCNC: 130 MG/DL
TRIGL SERPL-MCNC: 57 MG/DL

## 2022-04-11 PROCEDURE — 83789 MASS SPECTROMETRY QUAL/QUAN: CPT

## 2022-04-11 PROCEDURE — 99214 OFFICE O/P EST MOD 30 MIN: CPT | Performed by: NURSE PRACTITIONER

## 2022-04-11 PROCEDURE — 82306 VITAMIN D 25 HYDROXY: CPT

## 2022-04-11 PROCEDURE — 36415 COLL VENOUS BLD VENIPUNCTURE: CPT

## 2022-04-11 PROCEDURE — 80061 LIPID PANEL: CPT

## 2022-04-11 PROCEDURE — 83036 HEMOGLOBIN GLYCOSYLATED A1C: CPT

## 2022-04-11 RX ORDER — PREDNISONE 10 MG/1
TABLET ORAL
Qty: 20 TABLET | Refills: 0 | Status: SHIPPED | OUTPATIENT
Start: 2022-04-11 | End: 2022-04-19

## 2022-04-11 RX ORDER — BETAMETHASONE DIPROPIONATE 0.5 MG/G
CREAM TOPICAL 2 TIMES DAILY
Qty: 30 G | Refills: 0 | Status: SHIPPED | OUTPATIENT
Start: 2022-04-11

## 2022-04-11 RX ORDER — LORATADINE 10 MG/1
10 TABLET ORAL DAILY
Qty: 30 TABLET | Refills: 2 | Status: SHIPPED | OUTPATIENT
Start: 2022-04-11

## 2022-04-11 NOTE — ASSESSMENT & PLAN NOTE
Start prednisone taper, wear cock up wrist braces  RA negative, autoimmune and add'l workup from  Rheumatology negative

## 2022-04-11 NOTE — PROGRESS NOTES
Assessment/Plan:    Problem List Items Addressed This Visit        Endocrine    Thyromegaly       Recheck TSH, obtain ultrasound  Relevant Medications    predniSONE 10 mg tablet    Other Relevant Orders    US thyroid    Iodine, Serum/Plasma    Subclinical hypothyroidism       Recheck TSH, ultrasound for thyroid  Relevant Medications    predniSONE 10 mg tablet    Other Relevant Orders    Vitamin D 25 hydroxy    Hemoglobin A1C    Lipid panel       Nervous and Auditory    Carpal tunnel syndrome of left wrist - Primary     Start prednisone taper, wear cock up wrist braces  RA negative, autoimmune and add'l workup from  Rheumatology negative  Relevant Medications    predniSONE 10 mg tablet      Other Visit Diagnoses     Urticaria        Relevant Medications    betamethasone dipropionate (DIPROSONE) 0 05 % cream    loratadine (CLARITIN) 10 mg tablet            Return in about 3 months (around 7/11/2022) for f/u carpal tunnel, vit d, thyromegaly  A chart review was performed and previous primary care visit notes were reviewed  All applicable imaging studies were reviewed and images were reviewed personally  All applicable laboratory studies were reviewed personally  Care everywhere review was performed if  available and all pertinent notes were reviewed  Subjective:     HPI: Valentino Jacobs is a 55 y o  female who  has a past medical history of Depression, No known health problems, and Varicose veins of both lower extremities  who presented to the office today for ED f/u  She was seen last month in the ED for chest pain with radiation down the left arm  Her EKG was normal, her x-ray of the chest was unremarkable  She was discharged with Pepcid for dyspepsia  She does complain today of some mild bruising on her bilateral lower arms and she is known to have very low vitamin-D  She also follows Rheumatology for chronic joint pain    Inflammation tests were all negative an autoimmune tests were unremarkable  Rheumatology believes this is myofascial pain  She does have subclinical hypothyroidism but also states that her neck has been swelling and is tender to the touch  She has not had a recheck of her TSH recently and she is only on 25 mcg of levothyroxine  She does state that her bilateral wrist pain likely carpal tunnel has also flared up recently  The following portions of the patient's history were reviewed and updated as appropriate: allergies, current medications, past family history, past medical history, past social history, past surgical history, and problem list     Current Outpatient Medications on File Prior to Visit   Medication Sig Dispense Refill    acetaminophen (TYLENOL) 500 mg tablet Take 2 tablets (1,000 mg total) by mouth every 6 (six) hours as needed for moderate pain (Patient taking differently: Take 1,000 mg by mouth every 6 (six) hours as needed for moderate pain As needed ) 90 tablet 3    cholecalciferol (VITAMIN D3) 1,000 units tablet Take 1 tablet (1,000 Units total) by mouth daily 30 tablet 2    cyclobenzaprine (FLEXERIL) 10 mg tablet Take 1 tablet (10 mg total) by mouth daily at bedtime 30 tablet 3    Diclofenac Sodium (VOLTAREN) 1 % Apply 2 g topically 4 (four) times a day (Patient not taking: Reported on 9/27/2021) 100 g 2    levothyroxine (Synthroid) 25 mcg tablet Take 1 tablet (25 mcg total) by mouth daily in the early morning 90 tablet 3    naproxen (NAPROSYN) 500 mg tablet Take 1 tablet (500 mg total) by mouth 2 (two) times a day with meals 60 tablet 3    [DISCONTINUED] methylPREDNISolone 4 MG tablet therapy pack Use as directed on package (Patient not taking: Reported on 4/6/2022 ) 21 each 0     No current facility-administered medications on file prior to visit  Review of Systems   Constitutional: Negative  HENT: Negative  Eyes: Negative  Respiratory: Negative  Cardiovascular: Negative  Gastrointestinal: Negative      Endocrine: Thyroid swelling   Genitourinary: Negative  Musculoskeletal: Positive for arthralgias and myalgias  Skin:        Light bruising of arms   Allergic/Immunologic: Negative  Neurological: Negative  Psychiatric/Behavioral: Negative  Objective:    /72 (BP Location: Left arm, Patient Position: Sitting, Cuff Size: Standard)   Pulse 85   Temp 97 8 °F (36 6 °C) (Temporal)   Resp 18   Ht 5' 1" (1 549 m)   Wt 73 5 kg (162 lb)   LMP 04/09/2022 (Exact Date)   SpO2 98%   BMI 30 61 kg/m²     Physical Exam  Vitals reviewed  Constitutional:       General: She is not in acute distress  Appearance: Normal appearance  HENT:      Head: Normocephalic  Right Ear: External ear normal       Left Ear: External ear normal       Nose: Nose normal  No congestion  Mouth/Throat:      Mouth: Mucous membranes are moist    Eyes:      Extraocular Movements: Extraocular movements intact  Conjunctiva/sclera: Conjunctivae normal    Cardiovascular:      Rate and Rhythm: Normal rate and regular rhythm  Heart sounds: Normal heart sounds  No murmur heard  No friction rub  No gallop  Pulmonary:      Effort: Pulmonary effort is normal       Breath sounds: Normal breath sounds  No wheezing  Musculoskeletal:      Right hand: Swelling and bony tenderness present  Decreased range of motion  Left hand: Swelling and bony tenderness present  Decreased range of motion  Cervical back: Normal range of motion and neck supple  No muscular tenderness  Right lower leg: No edema  Left lower leg: No edema  Skin:     General: Skin is warm and dry  Capillary Refill: Capillary refill takes less than 2 seconds  Neurological:      General: No focal deficit present  Mental Status: She is alert  Psychiatric:         Mood and Affect: Mood normal          Behavior: Behavior normal          Thought Content:  Thought content normal          JEANETH Ramos  04/11/22  11:05 AM    There are no Patient Instructions on file for this visit

## 2022-04-13 LAB — IODINE SERPL-MCNC: 44.8 UG/L (ref 40–92)

## 2022-04-25 ENCOUNTER — HOSPITAL ENCOUNTER (OUTPATIENT)
Dept: ULTRASOUND IMAGING | Facility: HOSPITAL | Age: 47
Discharge: HOME | End: 2022-04-25
Payer: COMMERCIAL

## 2022-04-25 DIAGNOSIS — E01.0 THYROMEGALY: ICD-10-CM

## 2022-04-25 PROCEDURE — 76536 US EXAM OF HEAD AND NECK: CPT

## 2022-05-16 PROBLEM — R53.83 FATIGUE: Status: RESOLVED | Noted: 2017-09-07 | Resolved: 2022-05-16

## 2022-05-16 PROBLEM — R42 VERTIGO: Status: RESOLVED | Noted: 2018-05-21 | Resolved: 2022-05-16

## 2022-05-16 PROBLEM — M54.2 NECK PAIN: Status: RESOLVED | Noted: 2018-05-21 | Resolved: 2022-05-16

## 2022-05-16 RX ORDER — FAMOTIDINE 20 MG/1
20 TABLET, FILM COATED ORAL 2 TIMES DAILY
COMMUNITY
Start: 2022-03-01

## 2022-05-16 NOTE — PROGRESS NOTES
OB/GYN ANNUAL H&P    SUBJECTIVE:    Serbian interpretor: [id number]    Kelly Melendez is a 55 y o  female who presents for annual well woman exam  Periods are regular every 28-30 days, lasting 5-6 days  LMP 5/6/22  She notes bilateral lower abdominal starting about 2 weeks ago which is equal bilaterally  She denies constipation, diarrhea, fever, chills, nausea, vomiting, and urinary symptoms (including dysuria, hematuria, urgency, frequency)  The pain is intermittent and self resolving, and worsens with eating  She describes the pain as being triggered by eating consistently, and she points across her bilateral lower quadrants  She plans to discuss this with her PCP  Today, she reports not feeling well due to pain "all over my body "  This pain started 8 months ago and is located "in my bones in my hands and my whole body and I feel tired "  She has not seen any doctors for this pain yet  Today, she notes sore throat and cough starting 2 days ago  She took a Covid test on Saturday which was negative  She believes her symptoms are secondary to allergies, and we discussed that she can follow up with her PCP if her symptoms worsen or fail to improve  GYN:  · No vaginal discharge, labial erythema or lesions, dyspareunia  · Menses are regular, q 28-30 days, lasting 5-6 days  · Contraception: no   · Patient is not sexually active currently  · STI history: no   · Gynecologic surgery: CS x1, ovarian cyst surgery (benign per patient)    OB:  · [id number] female  Pregnancies were uncomplicated  :  · No dysuria, urinary frequency or urgency  · No hematuria, flank pain, incontinence  Breast:  · No breast mass, skin changes, dimpling, reddening, nipple retraction  · No breast discharge  · Last mammogram on 4/12/21  Results were BIRADs 1 (negative)  Due for next mammogram per ACOG guidelines, ordered today    · Patient does no have a family history of breast, endometrial, or ovarian cancer  General:  · Diet: "I eat everything"  · Exercise: no  · Work: works cleaning offices  · ETOH use: no  · Tobacco use: no  · Recreational drug use: no    Screening:  · Cervical cancer: last pap smear on 3/8/21  Results were NILM, HPV negative  Next due 3/2026  · Breast cancer: last mammogram on 4/12/21  Results were BIRADs 1 (negative)  Due for next mammogram per ACOG guidelines, ordered today  · Colon cancer: no colonoscopy to date  Referred to GI today for colonoscopy per USPSTF guidelines  · STI screening: GC/CT, RPR, HIV, hep B and C ordered today  Immunizations:  · COVID: s/p 3 doses  · Flu: no  · Gardasil: no, beyond age range for vaccination    Review of Systems:  Pertinent items are noted in HPI  OBJECTIVE:    Vitals:   /80 (BP Location: Left arm, Patient Position: Sitting, Cuff Size: Adult)   Pulse 80   Resp 18   Ht 5' 1" (1 549 m)   Wt 72 6 kg (160 lb)   LMP 05/06/2022 (Exact Date)   BMI 30 23 kg/m²     Physical Exam  Constitutional:       General: She is not in acute distress  Appearance: Normal appearance  She is not ill-appearing  Genitourinary:      Urethral meatus normal       No lesions in the vagina  Right Labia: No rash, tenderness, lesions, skin changes or Bartholin's cyst      Left Labia: No tenderness, lesions, skin changes, Bartholin's cyst or rash  No labial fusion noted  Pelvic Inck Score: 5/5  No vaginal discharge, erythema, tenderness, bleeding, ulceration or granulation tissue  No vaginal prolapse present  No vaginal atrophy present  Right Adnexa: not tender, not full, not palpable and no mass present  Left Adnexa: not tender, not full, not palpable and no mass present  Cervix is parous  Cervix is not absent  No cervical motion tenderness, discharge, friability, lesion, polyp or nabothian cyst       Uterus is not enlarged, fixed, tender, irregular or prolapsed  No uterine mass detected  Uterus is anteverted  Breasts: Nick Score is 5  Breasts are symmetrical       Breasts are soft  Right: Present  No swelling, bleeding, inverted nipple, mass, nipple discharge, skin change, tenderness, breast implant, axillary adenopathy or supraclavicular adenopathy  Left: Present  No swelling, bleeding, inverted nipple, mass, nipple discharge, skin change, tenderness, breast implant, axillary adenopathy or supraclavicular adenopathy  HENT:      Mouth/Throat:      Mouth: Mucous membranes are moist    Eyes:      Extraocular Movements: Extraocular movements intact  Cardiovascular:      Rate and Rhythm: Normal rate and regular rhythm  Heart sounds: Normal heart sounds  Pulmonary:      Effort: Pulmonary effort is normal       Breath sounds: Normal breath sounds  Abdominal:      General: There is no distension  Palpations: Abdomen is soft  Tenderness: There is no abdominal tenderness  Musculoskeletal:         General: No swelling  Right lower leg: No edema  Left lower leg: No edema  Lymphadenopathy:      Upper Body:      Right upper body: No supraclavicular or axillary adenopathy  Left upper body: No supraclavicular or axillary adenopathy  Neurological:      General: No focal deficit present  Mental Status: She is alert  Skin:     General: Skin is warm and dry  Capillary Refill: Capillary refill takes less than 2 seconds  Coloration: Skin is not jaundiced or pale  Psychiatric:         Mood and Affect: Mood normal          Behavior: Behavior normal          Thought Content: Thought content normal          Judgment: Judgment normal            ASSESSMENT:    Geno Washington is a 55 y o  [id number] with normal gynecologic exam     PLAN:    1  Women's annual routine gynecological examination  - Mammo screening bilateral w cad; Future  - Ambulatory Referral to Gastroenterology;  Future  - Chlamydia/GC amplified DNA by PCR  - HIV 1/2 ANTIGEN/ANTIBODY (4TH GENERATION) W REFLEX [facility]; Future  - Hepatitis B surface antigen; Future  - Hepatitis C antibody; Future  - RPR;  Future        Marline Diane MD  05/17/22  9:06 AM

## 2022-05-17 ENCOUNTER — ANNUAL EXAM (OUTPATIENT)
Dept: OBGYN CLINIC | Facility: CLINIC | Age: 47
End: 2022-05-17

## 2022-05-17 VITALS
HEIGHT: 61 IN | RESPIRATION RATE: 18 BRPM | HEART RATE: 80 BPM | SYSTOLIC BLOOD PRESSURE: 123 MMHG | WEIGHT: 160 LBS | BODY MASS INDEX: 30.21 KG/M2 | DIASTOLIC BLOOD PRESSURE: 80 MMHG

## 2022-05-17 DIAGNOSIS — Z01.419 WOMEN'S ANNUAL ROUTINE GYNECOLOGICAL EXAMINATION: ICD-10-CM

## 2022-05-17 DIAGNOSIS — Z11.3 ENCOUNTER FOR SCREENING FOR BACTERIAL SEXUALLY TRANSMITTED DISEASE: Primary | ICD-10-CM

## 2022-05-17 PROCEDURE — 99396 PREV VISIT EST AGE 40-64: CPT | Performed by: OBSTETRICS & GYNECOLOGY

## 2022-05-17 PROCEDURE — 87591 N.GONORRHOEAE DNA AMP PROB: CPT

## 2022-05-17 PROCEDURE — 87491 CHLMYD TRACH DNA AMP PROBE: CPT

## 2022-05-19 LAB
C TRACH DNA SPEC QL NAA+PROBE: NEGATIVE
N GONORRHOEA DNA SPEC QL NAA+PROBE: NEGATIVE

## 2022-05-24 ENCOUNTER — TELEPHONE (OUTPATIENT)
Dept: OBGYN CLINIC | Facility: CLINIC | Age: 47
End: 2022-05-24

## 2022-05-24 NOTE — TELEPHONE ENCOUNTER
----- Message from Robert Piedra sent at 5/24/2022  8:37 AM EDT -----  Regarding: FW: Normal STI testing  Do you mind calling this Turkish speaking pt? Thanks for your help :) Cierra Cunningham  ----- Message -----  From: Robbie Hopson MD  Sent: 5/19/2022   8:49 PM EDT  To: [facility]  Subject: Normal STI testing                               Hi all,    Could you call this patient to let her know her gonorrhea and chlamydia tests were negative? It looks like she hasn't done her blood work for other STIs yet, so you can also just remind her to get those done  Thanks!     Robbie Hopson MD  OBGYN PGY1

## 2022-05-24 NOTE — TELEPHONE ENCOUNTER
Was able to speak to pt confirming neg GCC results  Also let her know she still has lab orders pending , pt stated understanding that she would be completing them soon  Also let her know when we received those results would would call her with them as well

## 2022-05-25 ENCOUNTER — APPOINTMENT (OUTPATIENT)
Dept: LAB | Facility: HOSPITAL | Age: 47
End: 2022-05-25
Payer: COMMERCIAL

## 2022-05-25 DIAGNOSIS — Z01.419 WOMEN'S ANNUAL ROUTINE GYNECOLOGICAL EXAMINATION: ICD-10-CM

## 2022-05-25 LAB
HBV SURFACE AG SER QL: NORMAL
HCV AB SER QL: NORMAL
RPR SER QL: NORMAL

## 2022-05-25 PROCEDURE — 87389 HIV-1 AG W/HIV-1&-2 AB AG IA: CPT

## 2022-05-25 PROCEDURE — 86592 SYPHILIS TEST NON-TREP QUAL: CPT

## 2022-05-25 PROCEDURE — 36415 COLL VENOUS BLD VENIPUNCTURE: CPT

## 2022-05-25 PROCEDURE — 87340 HEPATITIS B SURFACE AG IA: CPT

## 2022-05-25 PROCEDURE — 86803 HEPATITIS C AB TEST: CPT

## 2022-05-26 LAB — HIV 1+2 AB+HIV1 P24 AG SERPL QL IA: NORMAL

## 2022-05-31 ENCOUNTER — TELEPHONE (OUTPATIENT)
Dept: OBGYN CLINIC | Facility: CLINIC | Age: 47
End: 2022-05-31

## 2022-05-31 NOTE — TELEPHONE ENCOUNTER
Called pt to notify her of neg sti results  Pt stated understanding and did not have any further questions

## 2022-06-17 ENCOUNTER — HOSPITAL ENCOUNTER (OUTPATIENT)
Dept: MAMMOGRAPHY | Facility: CLINIC | Age: 47
Discharge: HOME | End: 2022-06-17
Payer: COMMERCIAL

## 2022-06-17 VITALS — HEIGHT: 61 IN | BODY MASS INDEX: 30.21 KG/M2 | WEIGHT: 160 LBS

## 2022-06-17 DIAGNOSIS — Z12.31 VISIT FOR SCREENING MAMMOGRAM: ICD-10-CM

## 2022-06-17 DIAGNOSIS — Z01.419 WOMEN'S ANNUAL ROUTINE GYNECOLOGICAL EXAMINATION: ICD-10-CM

## 2022-06-17 PROCEDURE — 77067 SCR MAMMO BI INCL CAD: CPT

## 2022-06-17 PROCEDURE — 77063 BREAST TOMOSYNTHESIS BI: CPT

## 2022-06-24 ENCOUNTER — APPOINTMENT (OUTPATIENT)
Dept: LAB | Facility: MEDICAL CENTER | Age: 47
End: 2022-06-24
Payer: COMMERCIAL

## 2022-06-24 ENCOUNTER — CONSULT (OUTPATIENT)
Dept: GASTROENTEROLOGY | Facility: MEDICAL CENTER | Age: 47
End: 2022-06-24
Payer: COMMERCIAL

## 2022-06-24 VITALS
DIASTOLIC BLOOD PRESSURE: 68 MMHG | WEIGHT: 164.3 LBS | HEART RATE: 78 BPM | SYSTOLIC BLOOD PRESSURE: 127 MMHG | TEMPERATURE: 97.6 F | BODY MASS INDEX: 31.04 KG/M2

## 2022-06-24 DIAGNOSIS — K21.9 GASTROESOPHAGEAL REFLUX DISEASE, UNSPECIFIED WHETHER ESOPHAGITIS PRESENT: ICD-10-CM

## 2022-06-24 DIAGNOSIS — R10.84 GENERALIZED ABDOMINAL PAIN: Primary | ICD-10-CM

## 2022-06-24 DIAGNOSIS — Z01.419 WOMEN'S ANNUAL ROUTINE GYNECOLOGICAL EXAMINATION: ICD-10-CM

## 2022-06-24 DIAGNOSIS — Z12.11 COLON CANCER SCREENING: ICD-10-CM

## 2022-06-24 DIAGNOSIS — R19.7 DIARRHEA, UNSPECIFIED TYPE: ICD-10-CM

## 2022-06-24 LAB — IGA SERPL-MCNC: 296 MG/DL (ref 70–400)

## 2022-06-24 PROCEDURE — 36415 COLL VENOUS BLD VENIPUNCTURE: CPT

## 2022-06-24 PROCEDURE — 82784 ASSAY IGA/IGD/IGG/IGM EACH: CPT

## 2022-06-24 PROCEDURE — 86364 TISS TRNSGLTMNASE EA IG CLAS: CPT

## 2022-06-24 PROCEDURE — 99244 OFF/OP CNSLTJ NEW/EST MOD 40: CPT | Performed by: PHYSICIAN ASSISTANT

## 2022-06-24 RX ORDER — PANTOPRAZOLE SODIUM 20 MG/1
20 TABLET, DELAYED RELEASE ORAL DAILY
Qty: 30 TABLET | Refills: 2 | Status: SHIPPED | OUTPATIENT
Start: 2022-06-24

## 2022-06-24 RX ORDER — DICYCLOMINE HCL 20 MG
20 TABLET ORAL EVERY 6 HOURS
Qty: 120 TABLET | Refills: 2 | Status: SHIPPED | OUTPATIENT
Start: 2022-06-24

## 2022-06-24 NOTE — PROGRESS NOTES
[facility]  Nazanin Campoverde 52 y o  female MRN: [id number]  Encounter: [id number]          ASSESSMENT AND PLAN:      1  Generalized abdominal pain:   2  Diarrhea, unspecified type: She admits to a few month history of generalized abdominal pain, worse in the left lower quadrant associated with bowel change of diarrhea  She admits to having 4-5 episodes of postprandial diarrhea  She states that is just liquid in nature but denies any melena or hematochezia  Denies any nausea, vomiting  Will plan for CT scan to rule out diverticulitis given significant left lower quadrant pain and tenderness on exam radiating to her back  Will also order stool studies rule out infectious etiology  If this is all normal, would recommend colonoscopy with random biopsies for further evaluation  We will also perform EGD at the same time with biopsies to rule out celiac disease or H pylori  - dicyclomine (BENTYL) 20 mg tablet; Take 1 tablet (20 mg total) by mouth every 6 (six) hours  Dispense: 120 tablet; Refill: 2  - CT abdomen pelvis w contrast; Future  - Clostridium difficile toxin by PCR with EIA  - Giardia antigen  - Stool Enteric Bacterial Panel by PCR  - Pancreatic elastase, fecal  - Ova and parasite examination; Future  - Tissue transglutaminase, IgA; Future  - IgA; Future  - Calprotectin,Fecal  -EGD with gastric and small bowel biopsies    3  Colon cancer screening: she has never had a colonoscopy in the past  No family hx of colorectal cancer  Will plan for random biopsies at the same time due to diarrhea if stool tests are negative  -colonoscopy  -miralax/dulcolax    4  Gastroesophageal reflux disease, unspecified whether esophagitis present:  She admits to acid reflux but is not currently on any medication for this  Will start Protonix 20 mg daily  - pantoprazole (PROTONIX) 20 mg tablet;  Take 1 tablet (20 mg total) by mouth daily  Dispense: 30 tablet; Refill: 2    Risks of EGD and colonoscopy were discussed including but not limited to bleeding, infection, perforation  She understands and agrees to proceed with procedure        ______________________________________________________________________    HPI:  Elizabeth Tracy is a 51 yo female with no pmh here as a new patient who is Divehi-speaking for abdominal pain, diarrhea   [id number] was used via Wally  Her pain is worse in her left lower quadrant and radiates to her back  The symptoms have been present for the past few months  She also admits to abdominal distention and postprandial diarrhea  She states that immediately after eating she has 4-5 episodes of watery diarrhea on a daily basis  She denies any melena or hematochezia  Denies any fever/chills  He does admit to acid reflux is not taking any medication for this  She denies any sick contacts, recent antibiotic use, travel  She has no family history of colorectal cancers  She has never had EGD or colonoscopy in the past       REVIEW OF SYSTEMS:    CONSTITUTIONAL: Denies any fever, chills, rigors, and weight loss  HEENT: No earache or tinnitus  Denies hearing loss or visual disturbances  CARDIOVASCULAR: No chest pain or palpitations  RESPIRATORY: Denies any cough, hemoptysis, shortness of breath or dyspnea on exertion  GASTROINTESTINAL: As noted in the History of Present Illness  GENITOURINARY: No problems with urination  Denies any hematuria or dysuria  NEUROLOGIC: No dizziness or vertigo, denies headaches  MUSCULOSKELETAL: Denies any muscle or joint pain  SKIN: Denies skin rashes or itching  ENDOCRINE: Denies excessive thirst  Denies intolerance to heat or cold  PSYCHOSOCIAL: Denies depression or anxiety  Denies any recent memory loss         Historical Information   Past Medical History:   Diagnosis Date    Depression     No known health problems     Varicose veins of both lower extremities      Past Surgical History: Procedure Laterality Date     SECTION      FL INJECTION RIGHT HIP (NON ARTHROGRAM)  02/10/2021    HERNIA REPAIR      OVARIAN CYST SURGERY      at 15 yo     Social History   Social History     Substance and Sexual Activity   Alcohol Use No     Social History     Substance and Sexual Activity   Drug Use No     Social History     Tobacco Use   Smoking Status Never Smoker   Smokeless Tobacco Never Used     Family History   Problem Relation Age of Onset    Diabetes Mother     Heart failure Father     No Known Problems Sister     No Known Problems Daughter     No Known Problems Maternal Grandfather     No Known Problems Paternal Grandmother     No Known Problems Paternal Grandfather     No Known Problems Brother     No Known Problems Brother     No Known Problems Brother     No Known Problems Brother     No Known Problems Brother     No Known Problems Son     No Known Problems Maternal Aunt     No Known Problems Maternal Aunt     No Known Problems Maternal Aunt     No Known Problems Paternal Aunt     No Known Problems Paternal Aunt     Diabetes Other     Hyperlipidemia Other         pure       Meds/Allergies       Current Outpatient Medications:     dicyclomine (BENTYL) 20 mg tablet    pantoprazole (PROTONIX) 20 mg tablet    acetaminophen (TYLENOL) 500 mg tablet    betamethasone dipropionate (DIPROSONE) 0 05 % cream    cholecalciferol (VITAMIN D3) 1,000 units tablet    cyclobenzaprine (FLEXERIL) 10 mg tablet    Diclofenac Sodium (VOLTAREN) 1 %    famotidine (PEPCID) 20 mg tablet    levothyroxine (Synthroid) 25 mcg tablet    loratadine (CLARITIN) 10 mg tablet    naproxen (NAPROSYN) 500 mg tablet    No Known Allergies        Objective     Blood pressure 127/68, pulse 78, temperature 97 6 °F (36 4 °C), weight 74 5 kg (164 lb 4 8 oz), not currently breastfeeding  Body mass index is 31 04 kg/m²          PHYSICAL EXAM:      General Appearance:   Alert, cooperative, no distress HEENT:   Normocephalic, atraumatic, anicteric      Neck:  Supple, symmetrical, trachea midline   Lungs:   Clear to auscultation bilaterally; no rales, rhonchi or wheezing; respirations unlabored    Heart[de-identified]   Regular rate and rhythm; no murmur, rub, or gallop  Abdomen:   Soft, LLQ tenderness, distended; normal bowel sounds; no masses, no organomegaly    Genitalia:   Deferred    Rectal:   Deferred    Extremities:  No cyanosis, clubbing or edema    Pulses:  2+ and symmetric    Skin:  No jaundice, rashes, or lesions    Lymph nodes:  No palpable cervical lymphadenopathy        Lab Results:   No visits with results within 1 Day(s) from this visit  Latest known visit with results is:   Appointment on 05/25/2022   Component Date Value    HIV-1/HIV-2 Ab 05/25/2022 Non-Reactive     Hepatitis B Surface Ag 05/25/2022 Non-reactive     Hepatitis C Ab 05/25/2022 Non-reactive     RPR 05/25/2022 Non-Reactive          Radiology Results:   Mammo screening bilateral w 3d & cad    Result Date: 6/21/2022  Narrative: DIAGNOSIS: Women's annual routine gynecological examination; Visit for screening mammogram TECHNIQUE: Digital screening mammography was performed  Computer Aided Detection (CAD) analyzed all applicable images  COMPARISONS: Prior breast imaging dated: 04/12/2021, 12/31/2019, 12/02/2019, 10/03/2016, and 09/27/2016 RELEVANT HISTORY: Family Breast Cancer History: No known family history of breast cancer  Family Medical History: No known relevant family medical history  Personal History: No known relevant hormone history  No known relevant surgical history  No known relevant medical history  The patient is scheduled in a reminder system for screening mammography  8-10% of cancers will be missed on mammography  Management of a palpable abnormality must be based on clinical grounds  Patients will be notified of their results via letter from our facility  Accredited by [employer] of Radiology and FDA   RISK ASSESSMENT: 5 Year Tyrer-Cuzick: 0 4 % 10 Year Tyrer-Cuzick: 0 86 % Lifetime Tyrer-Cuzick: 4 45 % TISSUE DENSITY: There are scattered areas of fibroglandular density  INDICATION: Bunny Suggs is a 52 y o  female presenting for screening mammography  FINDINGS: There are no suspicious masses, grouped microcalcifications or areas of architectural distortion  The skin and nipple areolar complex are unremarkable  Impression: No mammographic evidence of malignancy  ASSESSMENT/BI-RADS CATEGORY: Left: 1 - Negative Right: 1 - Negative Overall: 1 - Negative RECOMMENDATION:      - Routine screening mammogram in 1 year for both breasts

## 2022-06-27 LAB — TTG IGA SER-ACNC: <2 U/ML (ref 0–3)

## 2022-06-28 ENCOUNTER — TELEPHONE (OUTPATIENT)
Dept: OBGYN CLINIC | Facility: CLINIC | Age: 47
End: 2022-06-28

## 2022-06-28 NOTE — TELEPHONE ENCOUNTER
----- Message from Everett Cabrera MD sent at 6/28/2022  8:30 AM EDT -----  Regarding: Normal Mammo Results  Hi all,    Can you call this patient to let her know that her mammogram was normal?  She will need routine repeat mammogram in 1 year  Thanks!     Everett Cabrera MD  06/28/22  8:31 AM

## 2022-06-29 ENCOUNTER — APPOINTMENT (OUTPATIENT)
Dept: LAB | Facility: HOSPITAL | Age: 47
End: 2022-06-29
Payer: COMMERCIAL

## 2022-06-29 DIAGNOSIS — R19.7 DIARRHEA, UNSPECIFIED TYPE: ICD-10-CM

## 2022-06-29 LAB
C DIFF TOX GENS STL QL NAA+PROBE: NEGATIVE
CAMPYLOBACTER DNA SPEC NAA+PROBE: NORMAL
SALMONELLA DNA SPEC QL NAA+PROBE: NORMAL
SHIGA TOXIN STX GENE SPEC NAA+PROBE: NORMAL
SHIGELLA DNA SPEC QL NAA+PROBE: NORMAL

## 2022-06-29 PROCEDURE — 83993 ASSAY FOR CALPROTECTIN FECAL: CPT | Performed by: PHYSICIAN ASSISTANT

## 2022-06-29 PROCEDURE — 82653 EL-1 FECAL QUANTITATIVE: CPT | Performed by: PHYSICIAN ASSISTANT

## 2022-06-29 PROCEDURE — 87177 OVA AND PARASITES SMEARS: CPT

## 2022-06-29 PROCEDURE — 87505 NFCT AGENT DETECTION GI: CPT | Performed by: PHYSICIAN ASSISTANT

## 2022-06-29 PROCEDURE — 87209 SMEAR COMPLEX STAIN: CPT

## 2022-06-30 LAB — G LAMBLIA AG STL QL IA: NEGATIVE

## 2022-07-02 LAB — ELASTASE PANC STL-MCNT: 434 UG ELAST./G

## 2022-07-06 LAB — CALPROTECTIN STL-MCNT: 41 UG/G (ref 0–120)

## 2022-07-10 PROBLEM — G56.03 CARPAL TUNNEL SYNDROME ON BOTH SIDES: Status: ACTIVE | Noted: 2022-07-10

## 2022-07-10 NOTE — PROGRESS NOTES
Assessment/Plan:    Subclinical hypothyroidism  Repeat TSH  Continue with home medication    Carpal tunnel syndrome on both sides  10/15/2021 bilateral hand/wrist XRs unremarkable  Previous rheumatologic studies done have been unremarkable  Ibuprofen 600mg q6 for 2 weeks  Bilateral wrist splinting   Can consider nerve conduction studies if progresses    Vitamin D deficiency  Repeat vitamin-D    Thyromegaly  04/25/2022 thyroid ultrasound:  Homogeneous thyroid  No thyroid nodules  12/30/2021 TSH: Normal    Chronic fatigue  Chronic issue that has been previously examined with no cause discovered  Previous Rheum exams negative    CBC, CMP, TSH  Ibuprofen 600mg q6 PRN for muscle aches       Diagnoses and all orders for this visit:    Chronic fatigue  -     CBC and differential; Future    Carpal tunnel syndrome on both sides  -     Comprehensive metabolic panel; Future  -     ibuprofen (MOTRIN) 600 mg tablet; Take 1 tablet (600 mg total) by mouth every 6 (six) hours as needed for mild pain for up to 14 days  -     Cock Up Wrist Splint    Subclinical hypothyroidism  -     TSH, 3rd generation with Free T4 reflex; Future    Thyromegaly    Vitamin D deficiency  -     Vitamin D 25 hydroxy; Future          Subjective:      Patient ID: Maryana Nath is a 52 y o  female  Pleasant 53yo female presenting for f/u of bilateral carpal tunnel and chronic fatigue  Carpal tunnel- given steroid taper on 4/11/22 which improved symptoms significantly  However 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  Pt continues to endorse tingling of bilateral hands, worse in the evenings  States previously asked to undergo PT but insurance does not cover sessions  Chronic fatigue- endorsing chronic body aches and fatigue present throughout the day when not working  Has been present for many years   Pt states she has undergone many studies but none have discovered possible cause  Currently taking levothyroxine 25mcg daily without difficulty  Continues to have regular menses with no concerns at this time  Pt is not sure whether she snores but notes she feels most tired right before bed  No pain when active throughout day  Has noted weight gain  Has taken tylenol, unknown dosage, but this too has not helped  The following portions of the patient's history were reviewed and updated as appropriate: allergies, current medications, past family history, past medical history, past social history, past surgical history and problem list     Review of Systems   Constitutional: Positive for fatigue  Negative for activity change, appetite change, chills and fever  HENT: Negative for congestion, ear pain, rhinorrhea, sore throat and trouble swallowing  Eyes: Negative for visual disturbance  Respiratory: Negative for cough and shortness of breath  Cardiovascular: Negative for palpitations  Gastrointestinal: Negative for abdominal pain, blood in stool, constipation, diarrhea, nausea and vomiting  Genitourinary: Negative for dysuria, hematuria and vaginal discharge  Musculoskeletal: Positive for myalgias  Skin: Positive for color change (noted pinpoint discolorations on arms  Not increasing in size or causing discomfort)  Negative for rash  Neurological: Positive for numbness  Negative for dizziness, weakness, light-headedness and headaches  Psychiatric/Behavioral: Negative for suicidal ideas  All other systems reviewed and are negative  Objective:      /60 (BP Location: Right arm, Patient Position: Sitting, Cuff Size: Standard)   Pulse 79   Temp 98 1 °F (36 7 °C) (Temporal)   Resp 18   Ht 5' 1" (1 549 m)   Wt 72 8 kg (160 lb 6 4 oz)   LMP 07/04/2022 (Exact Date)   SpO2 93%   BMI 30 31 kg/m²          Physical Exam  Vitals and nursing note reviewed  Constitutional:       General: She is not in acute distress  Appearance: Normal appearance   She is obese  She is not ill-appearing, toxic-appearing or diaphoretic  HENT:      Head: Normocephalic and atraumatic  Nose: Nose normal    Eyes:      General: No scleral icterus  Right eye: No discharge  Left eye: No discharge  Extraocular Movements: Extraocular movements intact  Conjunctiva/sclera: Conjunctivae normal    Cardiovascular:      Rate and Rhythm: Normal rate and regular rhythm  Pulses: Normal pulses  Heart sounds: Normal heart sounds  No murmur heard  No friction rub  No gallop  Pulmonary:      Effort: Pulmonary effort is normal  No respiratory distress  Breath sounds: Normal breath sounds  No stridor  No wheezing, rhonchi or rales  Musculoskeletal:         General: No swelling or tenderness  Cervical back: Normal range of motion  No tenderness  Right lower leg: No edema  Left lower leg: No edema  Skin:     General: Skin is warm and dry  Capillary Refill: Capillary refill takes less than 2 seconds  Coloration: Skin is not jaundiced  Findings: No abscess, bruising, ecchymosis, signs of injury, laceration, petechiae or rash  Comments: Pinpoint discolorations on skin of forearms  Neurological:      Mental Status: She is alert and oriented to person, place, and time     Psychiatric:         Mood and Affect: Mood normal          Behavior: Behavior normal

## 2022-07-10 NOTE — ASSESSMENT & PLAN NOTE
10/15/2021 bilateral hand/wrist XRs unremarkable  Previous rheumatologic studies done have been unremarkable      Ibuprofen 600mg q6 for 2 weeks  Bilateral wrist splinting   Can consider nerve conduction studies if progresses

## 2022-07-11 ENCOUNTER — OFFICE VISIT (OUTPATIENT)
Dept: FAMILY MEDICINE CLINIC | Facility: CLINIC | Age: 47
End: 2022-07-11

## 2022-07-11 ENCOUNTER — APPOINTMENT (OUTPATIENT)
Dept: LAB | Facility: CLINIC | Age: 47
End: 2022-07-11
Payer: COMMERCIAL

## 2022-07-11 VITALS
WEIGHT: 160.4 LBS | DIASTOLIC BLOOD PRESSURE: 60 MMHG | OXYGEN SATURATION: 93 % | BODY MASS INDEX: 30.29 KG/M2 | RESPIRATION RATE: 18 BRPM | HEART RATE: 79 BPM | HEIGHT: 61 IN | TEMPERATURE: 98.1 F | SYSTOLIC BLOOD PRESSURE: 100 MMHG

## 2022-07-11 DIAGNOSIS — G56.03 CARPAL TUNNEL SYNDROME ON BOTH SIDES: ICD-10-CM

## 2022-07-11 DIAGNOSIS — R53.82 CHRONIC FATIGUE: Primary | ICD-10-CM

## 2022-07-11 DIAGNOSIS — E01.0 THYROMEGALY: ICD-10-CM

## 2022-07-11 DIAGNOSIS — R53.82 CHRONIC FATIGUE: ICD-10-CM

## 2022-07-11 DIAGNOSIS — E03.8 SUBCLINICAL HYPOTHYROIDISM: ICD-10-CM

## 2022-07-11 DIAGNOSIS — E55.9 VITAMIN D DEFICIENCY: ICD-10-CM

## 2022-07-11 LAB
ALBUMIN SERPL BCP-MCNC: 3.8 G/DL (ref 3.5–5)
ALP SERPL-CCNC: 76 U/L (ref 46–116)
ALT SERPL W P-5'-P-CCNC: 27 U/L (ref 12–78)
ANION GAP SERPL CALCULATED.3IONS-SCNC: 3 MMOL/L (ref 4–13)
AST SERPL W P-5'-P-CCNC: 15 U/L (ref 5–45)
BASOPHILS # BLD AUTO: 0.09 THOUSANDS/ÂΜL (ref 0–0.1)
BASOPHILS NFR BLD AUTO: 1 % (ref 0–1)
BILIRUB SERPL-MCNC: 0.49 MG/DL (ref 0.2–1)
BUN SERPL-MCNC: 15 MG/DL (ref 5–25)
CALCIUM SERPL-MCNC: 9.2 MG/DL (ref 8.3–10.1)
CHLORIDE SERPL-SCNC: 107 MMOL/L (ref 100–108)
CO2 SERPL-SCNC: 28 MMOL/L (ref 21–32)
CREAT SERPL-MCNC: 0.68 MG/DL (ref 0.6–1.3)
EOSINOPHIL # BLD AUTO: 0.1 THOUSAND/ÂΜL (ref 0–0.61)
EOSINOPHIL NFR BLD AUTO: 1 % (ref 0–6)
ERYTHROCYTE [DISTWIDTH] IN BLOOD BY AUTOMATED COUNT: 12.1 % (ref 11.6–15.1)
GFR SERPL CREATININE-BSD FRML MDRD: 104 ML/MIN/1.73SQ M
GLUCOSE P FAST SERPL-MCNC: 90 MG/DL (ref 65–99)
HCT VFR BLD AUTO: 42.8 % (ref 34.8–46.1)
HGB BLD-MCNC: 13.6 G/DL (ref 11.5–15.4)
IMM GRANULOCYTES # BLD AUTO: 0.03 THOUSAND/UL (ref 0–0.2)
IMM GRANULOCYTES NFR BLD AUTO: 0 % (ref 0–2)
LYMPHOCYTES # BLD AUTO: 2.62 THOUSANDS/ÂΜL (ref 0.6–4.47)
LYMPHOCYTES NFR BLD AUTO: 37 % (ref 14–44)
MCH RBC QN AUTO: 33 PG (ref 26.8–34.3)
MCHC RBC AUTO-ENTMCNC: 31.8 G/DL (ref 31.4–37.4)
MCV RBC AUTO: 104 FL (ref 82–98)
MONOCYTES # BLD AUTO: 0.36 THOUSAND/ÂΜL (ref 0.17–1.22)
MONOCYTES NFR BLD AUTO: 5 % (ref 4–12)
NEUTROPHILS # BLD AUTO: 3.86 THOUSANDS/ÂΜL (ref 1.85–7.62)
NEUTS SEG NFR BLD AUTO: 56 % (ref 43–75)
NRBC BLD AUTO-RTO: 0 /100 WBCS
PLATELET # BLD AUTO: 314 THOUSANDS/UL (ref 149–390)
PMV BLD AUTO: 10.5 FL (ref 8.9–12.7)
POTASSIUM SERPL-SCNC: 4 MMOL/L (ref 3.5–5.3)
PROT SERPL-MCNC: 7.6 G/DL (ref 6.4–8.2)
RBC # BLD AUTO: 4.12 MILLION/UL (ref 3.81–5.12)
SODIUM SERPL-SCNC: 138 MMOL/L (ref 136–145)
TSH SERPL DL<=0.05 MIU/L-ACNC: 2.36 UIU/ML (ref 0.45–4.5)
WBC # BLD AUTO: 7.06 THOUSAND/UL (ref 4.31–10.16)

## 2022-07-11 PROCEDURE — 36415 COLL VENOUS BLD VENIPUNCTURE: CPT

## 2022-07-11 PROCEDURE — 85025 COMPLETE CBC W/AUTO DIFF WBC: CPT

## 2022-07-11 PROCEDURE — 99213 OFFICE O/P EST LOW 20 MIN: CPT | Performed by: FAMILY MEDICINE

## 2022-07-11 PROCEDURE — 84443 ASSAY THYROID STIM HORMONE: CPT

## 2022-07-11 PROCEDURE — 80053 COMPREHEN METABOLIC PANEL: CPT

## 2022-07-11 RX ORDER — IBUPROFEN 600 MG/1
600 TABLET ORAL EVERY 6 HOURS PRN
Qty: 30 TABLET | Refills: 0 | Status: SHIPPED | OUTPATIENT
Start: 2022-07-11 | End: 2022-07-25

## 2022-07-11 NOTE — PATIENT INSTRUCTIONS
Síndrome del túnel nona   CUIDADO AMBULATORIO:   El síndrome del túnel nona es amira afección que causa que se acumule presión en el túnel carpiano  El túnel carpiano es amira área pequeña entre los [place] y tejidos de arechiga [place]  Amira inflamación en esta área pone presión en el nervio mediano  El nervio mediano controla los músculos y la sensación en la [place]  Los signos y síntomas comunes son:  Dolor sordo, margo y punzante en la mano    Sensación de entumecimiento, hormigueo o ardor en el dedo pulgar, índice y medio    Dolor en el brazo que se extiende hasta el hombro    Debilidad en arechiga mano    Inflamación en arechiga mano    No poder controlar come [employer], o hansel caer objetos    Busque atención médica de inmediato si:  De repente usted pierde sensación en arechiga mano o dedos y no los puede   Aerchiga mano de repente cambia de color  Comuníquese con arechiga médico si:  Lea síntomas Natty Grieves  Arechiga mano y lea dedos se debilitan tanto que no puede agarrar, apretar o alzar los objetos  Usted tiene preguntas o inquietudes acerca de arechiga condición o cuidado  El tratamiento podría no ser necesario  Si lea síntomas persisten o son graves, usted podría necesitar cualquier de los siguientes:  Los [place], podría ser recomendado para reducir la inflamación y el dolor  Los AINEs se pueden obtener sin receta médica  Pregunte a arechiga médico cual medicamento es adecuado para usted y cuánto debería nahun  Tómelos kandace se le indique  Cuando no se viola de la [employer], los medicamentos antiinflamatorios no esteroides pueden causar sangrado estomacal o problemas renales  Si usted esta tomando un medicamento anticoagulante, siempre pregunte a arechiga médico si un ZEINAB es seguro para usted  Las inyecciones de esteroides podrían ayudar a reducir el dolor y la inflamación  Los esteroides se inyectan dentro del túnel carpiano      La neuroestimulación eléctrica transcutánea [place] impulsos eléctricos leves para ayudar a reducir arechiga dolor de say  La cirugía llamada descompresión del túnel carpiano se [place] para eliminar la presión sobre el nervio mediano en treadwell [place] [address]  El [place] de ashley síntomas:  Aplique hielo a treadwell say  El hielo ayuda a reducir la inflamación y el dolor en treadwell say  El hielo también puede contribuir a evitar el daño de los tejidos  Use amira compresa de hielo o ponga hielo triturado en amira bolsa de plástico  Cubra la compresa de hielo con amira toalla  Colóquela en treadwell say por 15 a 20 minutos cada hora, o kandace se le indique  Descanse ashley isrrael  Permita que ashley isrrael descansen por un tiempo cuando hace movimientos repetitivos kandace la mecanografía  Suspenda lo que está haciendo cuando usted sienta dolor en treadwell say y suavemente realice un masaje en treadwell say y [place]  Obtenga terapia física y ocupacional si se lo ocampo indicado  Un terapeuta físico le demostrará maneras de realizar ejercicios y fortalecer treadwell say  Un terapeuta ocupacional le demostrará maneras seguras de usar treadwell say mientras realiza ashley [place]  Use amira férula para la [employer] se le haya indicado  Amira férula mantiene treadwell say recta o en amira posición ligeramente flexionada  Amira férula para la say reduce la presión en el nervio mediano y de esta manera descansa la [place]  Es probable que usted necesite usar la férula por un denton de 8 semanas  Es posible que deba usarlo a la noche  Acuda a la consulta de control con treadwell médico según las indicaciones: Anote ashley preguntas para que se acuerde de hacerlas linda ashley visitas  © Copyright [employer] 2022 Information is for End User's use only and may not be sold, redistributed or otherwise used for commercial purposes  All illustrations and images included in CareNotes® are the copyrighted property of Ksenia ACKERMAN  or [address] [place] [address] es sólo para uso en educación  Treadwell intención no es darle un consejo médico sobre enfermedades o tratamientos   Colsulte con treadwell [place] Ev[place] farmacéutico antes de seguir cualquier régimen médico para saber si es seguro y efectivo para usted

## 2022-07-11 NOTE — ASSESSMENT & PLAN NOTE
Chronic issue that has been previously examined with no cause discovered   Previous Rheum exams negative    CBC, CMP, TSH  Ibuprofen 600mg q6 PRN for muscle aches

## 2022-08-16 RX ORDER — SODIUM CHLORIDE 9 MG/ML
125 INJECTION, SOLUTION INTRAVENOUS CONTINUOUS
Status: CANCELLED | OUTPATIENT
Start: 2022-08-16

## 2022-08-17 ENCOUNTER — ANESTHESIA EVENT (OUTPATIENT)
Dept: GASTROENTEROLOGY | Facility: MEDICAL CENTER | Age: 47
End: 2022-08-17

## 2022-08-17 ENCOUNTER — HOSPITAL ENCOUNTER (OUTPATIENT)
Dept: GASTROENTEROLOGY | Facility: MEDICAL CENTER | Age: 47
Setting detail: OUTPATIENT SURGERY
Discharge: HOME | End: 2022-08-17
Admitting: INTERNAL MEDICINE
Payer: COMMERCIAL

## 2022-08-17 ENCOUNTER — ANESTHESIA (OUTPATIENT)
Dept: GASTROENTEROLOGY | Facility: MEDICAL CENTER | Age: 47
End: 2022-08-17

## 2022-08-17 VITALS
BODY MASS INDEX: 29.44 KG/M2 | SYSTOLIC BLOOD PRESSURE: 120 MMHG | DIASTOLIC BLOOD PRESSURE: 66 MMHG | OXYGEN SATURATION: 100 % | HEART RATE: 67 BPM | WEIGHT: 160 LBS | TEMPERATURE: 98.4 F | HEIGHT: 62 IN | RESPIRATION RATE: 19 BRPM

## 2022-08-17 DIAGNOSIS — K21.9 GASTROESOPHAGEAL REFLUX DISEASE, UNSPECIFIED WHETHER ESOPHAGITIS PRESENT: ICD-10-CM

## 2022-08-17 DIAGNOSIS — R10.84 GENERALIZED ABDOMINAL PAIN: ICD-10-CM

## 2022-08-17 LAB
EXT PREGNANCY TEST URINE: NEGATIVE
EXT. CONTROL: NORMAL

## 2022-08-17 PROCEDURE — 81025 URINE PREGNANCY TEST: CPT | Performed by: ANESTHESIOLOGY

## 2022-08-17 PROCEDURE — 45380 COLONOSCOPY AND BIOPSY: CPT | Performed by: INTERNAL MEDICINE

## 2022-08-17 PROCEDURE — 88342 IMHCHEM/IMCYTCHM 1ST ANTB: CPT | Performed by: PATHOLOGY

## 2022-08-17 PROCEDURE — 88305 TISSUE EXAM BY PATHOLOGIST: CPT | Performed by: PATHOLOGY

## 2022-08-17 PROCEDURE — 43239 EGD BIOPSY SINGLE/MULTIPLE: CPT | Performed by: INTERNAL MEDICINE

## 2022-08-17 RX ORDER — PROPOFOL 10 MG/ML
INJECTION, EMULSION INTRAVENOUS AS NEEDED
Status: DISCONTINUED | OUTPATIENT
Start: 2022-08-17 | End: 2022-08-17

## 2022-08-17 RX ORDER — PANTOPRAZOLE SODIUM 20 MG/1
20 TABLET, DELAYED RELEASE ORAL DAILY
Qty: 30 TABLET | Refills: 2 | Status: SHIPPED | OUTPATIENT
Start: 2022-08-17 | End: 2022-09-16

## 2022-08-17 RX ORDER — SODIUM CHLORIDE 9 MG/ML
125 INJECTION, SOLUTION INTRAVENOUS CONTINUOUS
Status: DISCONTINUED | OUTPATIENT
Start: 2022-08-17 | End: 2022-08-21 | Stop reason: HOSPADM

## 2022-08-17 RX ORDER — LIDOCAINE HYDROCHLORIDE 20 MG/ML
INJECTION, SOLUTION EPIDURAL; INFILTRATION; INTRACAUDAL; PERINEURAL AS NEEDED
Status: DISCONTINUED | OUTPATIENT
Start: 2022-08-17 | End: 2022-08-17

## 2022-08-17 RX ORDER — PROPOFOL 10 MG/ML
INJECTION, EMULSION INTRAVENOUS CONTINUOUS PRN
Status: DISCONTINUED | OUTPATIENT
Start: 2022-08-17 | End: 2022-08-17

## 2022-08-17 RX ADMIN — PROPOFOL 30 MG: 10 INJECTION, EMULSION INTRAVENOUS at 08:25

## 2022-08-17 RX ADMIN — SODIUM CHLORIDE 125 ML/HR: 0.9 INJECTION, SOLUTION INTRAVENOUS at 08:11

## 2022-08-17 RX ADMIN — PROPOFOL 100 MG: 10 INJECTION, EMULSION INTRAVENOUS at 08:21

## 2022-08-17 RX ADMIN — LIDOCAINE HYDROCHLORIDE 100 MG: 20 INJECTION, SOLUTION EPIDURAL; INFILTRATION; INTRACAUDAL at 08:21

## 2022-08-17 RX ADMIN — PROPOFOL 20 MG: 10 INJECTION, EMULSION INTRAVENOUS at 08:28

## 2022-08-17 RX ADMIN — Medication 40 MG: at 08:36

## 2022-08-17 RX ADMIN — PROPOFOL 100 MCG/KG/MIN: 10 INJECTION, EMULSION INTRAVENOUS at 08:32

## 2022-08-17 RX ADMIN — PROPOFOL 20 MG: 10 INJECTION, EMULSION INTRAVENOUS at 08:31

## 2022-08-17 RX ADMIN — PROPOFOL 30 MG: 10 INJECTION, EMULSION INTRAVENOUS at 08:23

## 2022-08-17 NOTE — ANESTHESIA POSTPROCEDURE EVALUATION
Post-Op Assessment Note    CV Status:  Stable    Pain management: adequate     Mental Status:  Alert and awake   Hydration Status:  Euvolemic   PONV Controlled:  Controlled   Airway Patency:  Patent   Two or more mitigation strategies used for obstructive sleep apnea   Post Op Vitals Reviewed: Yes      Staff: Anesthesiologist         No complications documented      BP      Temp      Pulse     Resp      SpO2      /66   Pulse 67   Temp 98 4 °F (36 9 °C) (Temporal)   Resp 19   Ht 5' 2" (1 575 m)   Wt 72 6 kg (160 lb)   SpO2 100%   BMI 29 26 kg/m²

## 2022-08-17 NOTE — H&P
History and Physical -  [facility]  Carole Pulido Tyrus Hammans 52 y o  female MRN: [id number]                  HPI: Mathew Fajardo is a 52y o  year old female who presents for colonoscopy due to abdominal pain and diarrhea      REVIEW OF SYSTEMS: Per the HPI, and otherwise unremarkable  Historical Information   Past Medical History:   Diagnosis Date    Depression     Hypothyroid     No known health problems     Varicose veins of both lower extremities      Past Surgical History:   Procedure Laterality Date     SECTION      FL INJECTION RIGHT HIP (NON ARTHROGRAM)  02/10/2021    HERNIA REPAIR      OVARIAN CYST SURGERY      at 15 yo     Social History   Social History     Substance and Sexual Activity   Alcohol Use No     Social History     Substance and Sexual Activity   Drug Use No     Social History     Tobacco Use   Smoking Status Never Smoker   Smokeless Tobacco Never Used     Family History   Problem Relation Age of Onset    Diabetes Mother     Heart failure Father     No Known Problems Sister     No Known Problems Daughter     No Known Problems Maternal Grandfather     No Known Problems Paternal Grandmother     No Known Problems Paternal Grandfather     No Known Problems Brother     No Known Problems Brother     No Known Problems Brother     No Known Problems Brother     No Known Problems Brother     No Known Problems Son     No Known Problems Maternal Aunt     No Known Problems Maternal Aunt     No Known Problems Maternal Aunt     No Known Problems Paternal Aunt     No Known Problems Paternal Aunt     Diabetes Other     Hyperlipidemia Other         pure       Meds/Allergies     (Not in a hospital admission)      No Known Allergies    Objective     not currently breastfeeding        PHYSICAL EXAM    Gen: NAD  CV: RRR  CHEST: Clear  ABD: soft, NT/ND  EXT: no edema      ASSESSMENT/PLAN:  This is a 52y o  year old female here for EGD and colonoscopy    PLAN:   Procedure: EGD and colonoscopy

## 2022-08-17 NOTE — ANESTHESIA PREPROCEDURE EVALUATION
Procedure:  COLONOSCOPY  EGD    Relevant Problems   ANESTHESIA (within normal limits)      CARDIO (within normal limits)      ENDO   (+) Subclinical hypothyroidism      PULMONARY (within normal limits)        Physical Exam    Airway    Mallampati score: II  TM Distance: >3 FB  Neck ROM: full     Dental   No notable dental hx     Cardiovascular  Rhythm: regular, Rate: normal, Cardiovascular exam normal    Pulmonary  Pulmonary exam normal Breath sounds clear to auscultation,     Other Findings        Anesthesia Plan  ASA Score- 2     Anesthesia Type- IV sedation with anesthesia with ASA Monitors  Additional Monitors:   Airway Plan:           Plan Factors-Exercise tolerance (METS): >4 METS  Chart reviewed  Existing labs reviewed  Patient summary reviewed  Patient is not a current smoker  Induction-     Postoperative Plan-     Informed Consent- Anesthetic plan and risks discussed with patient

## 2022-09-02 DIAGNOSIS — A04.8 H. PYLORI INFECTION: Primary | ICD-10-CM

## 2022-09-02 RX ORDER — CLARITHROMYCIN 500 MG/1
500 TABLET, COATED ORAL EVERY 12 HOURS SCHEDULED
Qty: 28 TABLET | Refills: 0 | Status: SHIPPED | OUTPATIENT
Start: 2022-09-02 | End: 2022-09-16

## 2022-09-02 RX ORDER — OMEPRAZOLE 40 MG/1
40 CAPSULE, DELAYED RELEASE ORAL 2 TIMES DAILY
Qty: 28 CAPSULE | Refills: 0 | Status: SHIPPED | OUTPATIENT
Start: 2022-09-02 | End: 2022-10-06 | Stop reason: ALTCHOICE

## 2022-09-02 RX ORDER — AMOXICILLIN 500 MG/1
1000 CAPSULE ORAL EVERY 12 HOURS SCHEDULED
Qty: 56 CAPSULE | Refills: 0 | Status: SHIPPED | OUTPATIENT
Start: 2022-09-02 | End: 2022-09-16

## 2022-10-06 ENCOUNTER — OFFICE VISIT (OUTPATIENT)
Dept: FAMILY MEDICINE CLINIC | Facility: CLINIC | Age: 47
End: 2022-10-06

## 2022-10-06 VITALS
HEIGHT: 62 IN | WEIGHT: 160 LBS | OXYGEN SATURATION: 99 % | TEMPERATURE: 98.2 F | BODY MASS INDEX: 29.44 KG/M2 | RESPIRATION RATE: 18 BRPM | SYSTOLIC BLOOD PRESSURE: 128 MMHG | DIASTOLIC BLOOD PRESSURE: 80 MMHG | HEART RATE: 81 BPM

## 2022-10-06 DIAGNOSIS — M54.2 NECK PAIN: Primary | ICD-10-CM

## 2022-10-06 PROBLEM — Z59.89 DOES NOT HAVE HEALTH INSURANCE: Status: RESOLVED | Noted: 2021-03-22 | Resolved: 2022-10-06

## 2022-10-06 PROBLEM — G56.02 CARPAL TUNNEL SYNDROME OF LEFT WRIST: Status: RESOLVED | Noted: 2022-04-11 | Resolved: 2022-10-06

## 2022-10-06 PROBLEM — Z59.71 DOES NOT HAVE HEALTH INSURANCE: Status: RESOLVED | Noted: 2021-03-22 | Resolved: 2022-10-06

## 2022-10-06 PROBLEM — G56.01 CARPAL TUNNEL SYNDROME ON RIGHT: Status: RESOLVED | Noted: 2021-08-09 | Resolved: 2022-10-06

## 2022-10-06 PROCEDURE — 99214 OFFICE O/P EST MOD 30 MIN: CPT | Performed by: FAMILY MEDICINE

## 2022-10-06 RX ORDER — DULOXETIN HYDROCHLORIDE 30 MG/1
30 CAPSULE, DELAYED RELEASE ORAL DAILY
Qty: 60 CAPSULE | Refills: 0 | Status: SHIPPED | OUTPATIENT
Start: 2022-10-06

## 2022-10-06 RX ORDER — CYCLOBENZAPRINE HCL 10 MG
10 TABLET ORAL 3 TIMES DAILY PRN
Qty: 30 TABLET | Refills: 0 | Status: CANCELLED | OUTPATIENT
Start: 2022-10-06

## 2022-10-06 RX ORDER — METHOCARBAMOL 750 MG/1
750 TABLET, FILM COATED ORAL EVERY 12 HOURS PRN
Qty: 30 TABLET | Refills: 0 | Status: SHIPPED | OUTPATIENT
Start: 2022-10-06

## 2022-10-06 NOTE — PATIENT INSTRUCTIONS
Upper Back Exercises   AMBULATORY CARE:   Upper back exercises  help heal and strengthen your back muscles and prevent another injury  Ask your healthcare provider if you need to see a physical therapist for more advanced exercises  Do the exercises on a mat or firm surface  (not on a bed) to support your spine  Move slowly and smoothly  Avoid fast or jerky motions  Breathe normally  Do not hold your breath  Stop if you feel pain  It is normal to feel some discomfort at first  Regular exercise will help decrease your discomfort over time  Seek care immediately if:   You have severe pain that prevents you from moving  Contact your healthcare provider if:   Your pain becomes worse  You have new pain  You have questions or concerns about your condition, care, or exercise program     Perform upper back exercises safely:  Ask your healthcare provider which of the following exercises are best for you and how often to do them  Head rolls:  Sit in a chair or stand  Bring your chin toward your chest and roll your head to the right  Your ear should be over your shoulder  Hold this position for 5 seconds  Roll your head back toward your chest and to the left  Your ear should be over your left shoulder  Hold this position for 5 seconds  Next, roll your head back slowly in a clockwise [place] and repeat 3 times  Do 3 sets of head rolls  Scapular squeeze:  Sit or stand with your arms at your sides  Squeeze your shoulder blades together and hold for 3 seconds  Relax and repeat 3 times  Pectoralis stretch:   a doorway  Lift your hands and place them on each side of the door frame or wall slightly higher than your head  Lean forward slowly until you feel a gentle stretch  Hold for 15 seconds  Repeat 3 times, or as directed  Cat and camel exercise:  Place your hands and knees on the floor  Arch your back upward toward the ceiling and lower your head   Round out your spine as much as you can  Hold for 5 seconds  Lift your head upward and push your chest downward toward the floor  Hold for 5 seconds  Do 3 sets or as directed  Bird dog:  Place your hands and knees on the floor  Keep your wrists directly below your shoulders and your knees directly below your hips  Pull your belly button in toward your spine  Do not flatten or arch your back  Tighten your abdominal muscles  Raise one arm straight out so that it is aligned with your head  Next, raise the leg opposite your arm  Hold this position for 15 seconds  Lower your arm and leg slowly and change sides  Do 5 sets  © Copyright [employer] 2022 Information is for End User's use only and may not be sold, redistributed or otherwise used for commercial purposes  All illustrations and images included in CareNotes® are the copyrighted property of [employer]  or Rupinder Hope  The above information is an  only  It is not intended as medical advice for individual conditions or treatments  Talk to your doctor, nurse or pharmacist before following any medical regimen to see if it is safe and effective for you

## 2022-10-06 NOTE — ASSESSMENT & PLAN NOTE
Would consider fibromyalgia  Not convinced this is carpal tunnel  Had bilateral wrist imaging in the recent past - negative  Could consider a future EMG   For acute pain relief, will send course of robaxin 750 mg BID  Start duloxetine 30 mg for the next 1-2 weeks and then increase to 60 mg daily  Advise on ice compresses and upper back exercises  Recommended massages as well  Comprehensive spine referral placed

## 2022-10-06 NOTE — PROGRESS NOTES
Name: Katya Jonas      : 1975      MRN: [id number]  Encounter Provider: Zoila Hager MD  Encounter Date: 10/6/2022   [facility]     1  Neck pain  Assessment & Plan:  Would consider fibromyalgia  Not convinced this is carpal tunnel  Had bilateral wrist imaging in the recent past - negative  Could consider a future EMG   For acute pain relief, will send course of robaxin 750 mg BID  Start duloxetine 30 mg for the next 1-2 weeks and then increase to 60 mg daily  Advise on ice compresses and upper back exercises  Recommended massages as well  Comprehensive spine referral placed    Orders:  -     XR spine cervical complete 4 or 5 vw non injury; Future; Expected date: 10/06/2022  -     DULoxetine (Cymbalta) 30 mg delayed release capsule; Take 1 capsule (30 mg total) by mouth daily  -     methocarbamol (Robaxin-750) 750 mg tablet; Take 1 tablet (750 mg total) by mouth every 12 (twelve) hours as needed for muscle spasms  -     Ambulatory Referral to Comprehensive Spine PT; Future    Subjective      HPI   Patient is presenting with worsening pain in the right arm, radiating from the neck  Pain has been chronic, worsening over the last 4 months  She has not been getting much sleep at night  She works as a , but denies any heavy lifting  Takes ibuprofen 2x/day everyday, sometimes, naproxen  Has also tried topical analgesics  She has reported polyarthralgia with knees, hips and wrists in the past  Has been worked up in the past by rheumatology for other inflammatory conditions and orthopedics which have been benign  Has had medrol dose gil and steroid taper in the last few months  Also tried muscle relaxants from rheumatology  Review of Systems  As stated above      Current Outpatient Medications on File Prior to Visit   Medication Sig    acetaminophen (TYLENOL) 500 mg tablet Take 2 tablets (1,000 mg total) by mouth every 6 (six) hours as needed for moderate pain (Patient not taking: Reported on 6/24/2022)    betamethasone dipropionate (DIPROSONE) 0 05 % cream Apply topically 2 (two) times a day (Patient not taking: Reported on 6/24/2022)    cholecalciferol (VITAMIN D3) 1,000 units tablet Take 1 tablet (1,000 Units total) by mouth daily (Patient not taking: Reported on 6/24/2022)    cyclobenzaprine (FLEXERIL) 10 mg tablet Take 1 tablet (10 mg total) by mouth daily at bedtime (Patient not taking: Reported on 6/24/2022)    Diclofenac Sodium (VOLTAREN) 1 % Apply 2 g topically 4 (four) times a day (Patient not taking: Reported on 6/24/2022)    dicyclomine (BENTYL) 20 mg tablet Take 1 tablet (20 mg total) by mouth every 6 (six) hours (Patient not taking: Reported on 8/17/2022)    famotidine (PEPCID) 20 mg tablet Take 20 mg by mouth in the morning and 20 mg in the evening   (Patient not taking: Reported on 6/24/2022)    ibuprofen (MOTRIN) 600 mg tablet Take 1 tablet (600 mg total) by mouth every 6 (six) hours as needed for mild pain for up to 14 days    levothyroxine (Synthroid) 25 mcg tablet Take 1 tablet (25 mcg total) by mouth daily in the early morning    naproxen (NAPROSYN) 500 mg tablet Take 1 tablet (500 mg total) by mouth 2 (two) times a day with meals (Patient not taking: Reported on 6/24/2022)    pantoprazole (PROTONIX) 20 mg tablet Take 1 tablet (20 mg total) by mouth daily    [DISCONTINUED] loratadine (CLARITIN) 10 mg tablet Take 1 tablet (10 mg total) by mouth daily (Patient not taking: Reported on 6/24/2022)    [DISCONTINUED] omeprazole (PriLOSEC) 40 MG capsule Take 1 capsule (40 mg total) by mouth 2 (two) times a day for 14 days       Objective     /80 (BP Location: Left arm, Patient Position: Sitting, Cuff Size: Standard)   Pulse 81   Temp 98 2 °F (36 8 °C) (Temporal)   Resp 18   Ht 5' 2" (1 575 m)   Wt 72 6 kg (160 lb)   LMP 09/17/2022 (Approximate)   SpO2 99%   BMI 29 26 kg/m²     Physical Exam   Marked tenderness/tightness /spasm over the upper back, neck and down the right arm  Tinel test and phalen test unclear because patient reports numbness and tingling even without the tests and pain is diffuse along the right arm    Weakness RUE - motor strength 3/5 in the RUE, 5/5 in JOHANNA Alvarado MD

## 2022-11-10 ENCOUNTER — TELEPHONE (OUTPATIENT)
Dept: FAMILY MEDICINE CLINIC | Facility: CLINIC | Age: 47
End: 2022-11-10

## 2022-11-10 NOTE — TELEPHONE ENCOUNTER
I spoke to pt and she sates if pain medication can be sent, also she c/o left heel pain "she is in a lot pain"  I offered a same day appointment, she refused due to be unable to come in  please advise

## 2022-11-20 NOTE — PROGRESS NOTES
Name: Urbano Caro      : 1975      MRN: [id number]  Encounter Provider: Florencia Pallas, MD  Encounter Date: 2022   Encounter department: [address] [place] [address]     1  Encounter for vaccination  -     influenza vaccine, quadrivalent, recombinant, PF, 0 5 mL, for patients 18 yr+ (FLUBLOK)  -     Tdap Vaccine greater than or equal to 6yo    2  Vitamin D deficiency  -     cholecalciferol (VITAMIN D3) 1,000 units tablet; Take 1 tablet (1,000 Units total) by mouth daily    3  Gastroesophageal reflux disease, unspecified whether esophagitis present  -     pantoprazole (PROTONIX) 20 mg tablet; Take 1 tablet (20 mg total) by mouth daily    4  Chronic pain of both knees  -     acetaminophen (TYLENOL) 500 mg tablet; Take 2 tablets (1,000 mg total) by mouth every 6 (six) hours as needed for moderate pain    5  Plantar fasciitis  -     Ambulatory Referral to Sports Medicine; Future    6  Neck pain  Assessment & Plan:  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   Previous RF, anti-ccp, FILOMENA, lyme panel, ESR, CRP all negative in 2021  ESR, CRP (10/24/22) negative  Has not started duloxetine or robaxin as prescribed last visit  Patient would like to continue taking her artriifin;  Have discussed taking Protonix daily given NSAID component and possibly prolonged use of this medication  Advise on ice compresses and upper back exercises  Advised to go to her pharmacy to see what medications are available  May hold duloxetine for now and if needed can trial Robaxin 750 mg b i d  p r n  Advised patient to go to the [place] [address] heart radiology for cervical x-ray, given right upper extremity weakness    RTC for follow up in 3 months, or sooner based on results        Subjective      HPI   51 y/o F hx of subclinical hypothyroidism, chronic myalgia and arthralgias, here for follow up for chronic worsening bilateral arm pain radiating from the neck  She has hx of polyarthralgia with knees, hips, heels and wrists  Has been worked up in the past by rheumatology for other inflammatory conditions and orthopedics which have been benign  Has had medrol dose gil, steroid taper, muscle relaxants, NSAIDs and topical analgesics in the last few months  Was previously suspected to have carpal tunnel syndrome, which based on exam, seems much unlikely  Also had bilateral wrist imaging in the recent past which was negative  Last seen regarding this a month ago, started a course of robaxin and duloxetine with referral to comprehensive spine and cervical spine x-ray which patient did not obtain  She was seen in the [facility] ED a few weeks ago where blood work and inflammatory markers ESR CRP were negative  On continued probing of patient about whether or not she actually took any of the medications previously prescribed, patient confirms she has only been taking her levothyroxine the past year  Nothing else  She reports her pharmacy did not call her to say they had any medications  She has been using a medication called Artrifin in which she obtains from [place] and states 1 pill a day is very helpful for her pain  Upon looking up this medication, it is a supplement but contains some steroid and diclofenac  Patient would like a referral to sports medicine for her plantar fasciitis, for which she has been seen by Dr Jass Redding in the past and received steroid injections  Review of Systems   Constitutional: Negative for fever  Respiratory: Negative for shortness of breath and stridor  Cardiovascular: Negative for chest pain and palpitations  Musculoskeletal: Positive for arthralgias, back pain, myalgias and neck pain       Current Outpatient Medications on File Prior to Visit   Medication Sig   • DULoxetine (Cymbalta) 30 mg delayed release capsule Take 1 capsule (30 mg total) by mouth daily   • ibuprofen (MOTRIN) 600 mg tablet Take 1 tablet (600 mg total) by mouth every 6 (six) hours as needed for mild pain for up to 14 days   • levothyroxine (Synthroid) 25 mcg tablet Take 1 tablet (25 mcg total) by mouth daily in the early morning   • methocarbamol (Robaxin-750) 750 mg tablet Take 1 tablet (750 mg total) by mouth every 12 (twelve) hours as needed for muscle spasms   • [DISCONTINUED] acetaminophen (TYLENOL) 500 mg tablet Take 2 tablets (1,000 mg total) by mouth every 6 (six) hours as needed for moderate pain (Patient not taking: Reported on 6/24/2022)   • [DISCONTINUED] betamethasone dipropionate (DIPROSONE) 0 05 % cream Apply topically 2 (two) times a day (Patient not taking: Reported on 6/24/2022)   • [DISCONTINUED] cholecalciferol (VITAMIN D3) 1,000 units tablet Take 1 tablet (1,000 Units total) by mouth daily (Patient not taking: Reported on 6/24/2022)   • [DISCONTINUED] cyclobenzaprine (FLEXERIL) 10 mg tablet Take 1 tablet (10 mg total) by mouth daily at bedtime (Patient not taking: Reported on 6/24/2022)   • [DISCONTINUED] Diclofenac Sodium (VOLTAREN) 1 % Apply 2 g topically 4 (four) times a day (Patient not taking: Reported on 6/24/2022)   • [DISCONTINUED] dicyclomine (BENTYL) 20 mg tablet Take 1 tablet (20 mg total) by mouth every 6 (six) hours (Patient not taking: Reported on 8/17/2022)   • [DISCONTINUED] famotidine (PEPCID) 20 mg tablet Take 20 mg by mouth in the morning and 20 mg in the evening   (Patient not taking: Reported on 6/24/2022)   • [DISCONTINUED] naproxen (NAPROSYN) 500 mg tablet Take 1 tablet (500 mg total) by mouth 2 (two) times a day with meals (Patient not taking: Reported on 6/24/2022)   • [DISCONTINUED] pantoprazole (PROTONIX) 20 mg tablet Take 1 tablet (20 mg total) by mouth daily       Objective     /72 (BP Location: Right arm, Patient Position: Sitting, Cuff Size: Standard)   Pulse 66   Temp (!) 97 1 °F (36 2 °C) (Temporal)   Resp 18   Wt 72 2 kg (159 lb 3 2 oz) SpO2 99%   BMI 29 12 kg/m²     Physical Exam  Vitals reviewed  Constitutional:       General: She is not in acute distress  Appearance: She is not ill-appearing  Cardiovascular:      Rate and Rhythm: Normal rate and regular rhythm  Pulses: Normal pulses  Heart sounds: Normal heart sounds  No murmur heard  Pulmonary:      Effort: Pulmonary effort is normal  No respiratory distress  Breath sounds: Normal breath sounds  Musculoskeletal:         General: Tenderness present  No swelling, deformity or signs of injury  Right lower leg: No edema  Left lower leg: No edema  Comments: Marked tenderness/tightness /spasm over the upper back, neck and down the right arm  Tinel test and phalen test unclear because patient reports numbness and tingling even without the tests and pain is diffuse along the right arm  Weakness RUE - motor strength 3/5 in the RUE, 5/5 in LUE   Skin:     General: Skin is warm  Neurological:      Mental Status: She is alert          Heather Taylor MD

## 2022-11-21 ENCOUNTER — OFFICE VISIT (OUTPATIENT)
Dept: FAMILY MEDICINE CLINIC | Facility: CLINIC | Age: 47
End: 2022-11-21

## 2022-11-21 VITALS
DIASTOLIC BLOOD PRESSURE: 72 MMHG | TEMPERATURE: 97.1 F | HEART RATE: 66 BPM | OXYGEN SATURATION: 99 % | SYSTOLIC BLOOD PRESSURE: 122 MMHG | WEIGHT: 159.2 LBS | BODY MASS INDEX: 29.12 KG/M2 | RESPIRATION RATE: 18 BRPM

## 2022-11-21 DIAGNOSIS — E55.9 VITAMIN D DEFICIENCY: ICD-10-CM

## 2022-11-21 DIAGNOSIS — K21.9 GASTROESOPHAGEAL REFLUX DISEASE, UNSPECIFIED WHETHER ESOPHAGITIS PRESENT: ICD-10-CM

## 2022-11-21 DIAGNOSIS — Z23 ENCOUNTER FOR VACCINATION: Primary | ICD-10-CM

## 2022-11-21 DIAGNOSIS — M25.562 CHRONIC PAIN OF BOTH KNEES: ICD-10-CM

## 2022-11-21 DIAGNOSIS — M25.561 CHRONIC PAIN OF BOTH KNEES: ICD-10-CM

## 2022-11-21 DIAGNOSIS — M54.2 NECK PAIN: ICD-10-CM

## 2022-11-21 DIAGNOSIS — G89.29 CHRONIC PAIN OF BOTH KNEES: ICD-10-CM

## 2022-11-21 DIAGNOSIS — M72.2 PLANTAR FASCIITIS: ICD-10-CM

## 2022-11-21 RX ORDER — MELATONIN
1000 DAILY
Qty: 30 TABLET | Refills: 2 | Status: SHIPPED | OUTPATIENT
Start: 2022-11-21

## 2022-11-21 RX ORDER — PANTOPRAZOLE SODIUM 20 MG/1
20 TABLET, DELAYED RELEASE ORAL DAILY
Qty: 30 TABLET | Refills: 2 | Status: SHIPPED | OUTPATIENT
Start: 2022-11-21 | End: 2022-12-21

## 2022-11-21 RX ORDER — ACETAMINOPHEN 500 MG
1000 TABLET ORAL EVERY 6 HOURS PRN
Qty: 90 TABLET | Refills: 3 | Status: SHIPPED | OUTPATIENT
Start: 2022-11-21

## 2022-11-21 NOTE — ASSESSMENT & PLAN NOTE
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   Previous RF, anti-ccp, FILOMENA, lyme panel, ESR, CRP all negative in 9/2021  ESR, CRP (10/24/22) negative  Has not started duloxetine or robaxin as prescribed last visit  Patient would like to continue taking her artriifin;  Have discussed taking Protonix daily given NSAID component and possibly prolonged use of this medication  Advise on ice compresses and upper back exercises  Advised to go to her pharmacy to see what medications are available  May hold duloxetine for now and if needed can trial Robaxin 750 mg b i d  p r n  Advised patient to go to the [facility] [place] heart radiology for cervical x-ray, given right upper extremity weakness    RTC for follow up in 3 months, or sooner based on results

## 2022-11-28 ENCOUNTER — TELEPHONE (OUTPATIENT)
Dept: FAMILY MEDICINE CLINIC | Facility: CLINIC | Age: 47
End: 2022-11-28

## 2022-11-28 NOTE — TELEPHONE ENCOUNTER
PT LEFT VOICEMAIL STATING IF THE PCP CAN CALL BACK, I CALLED BACK THE PT AND PT STATES THE REFERRAL FOR THE PODIATRIST IS GIVING HARD TIME BECAUSE THEY DONT TAKE [place] MEDICAID INSURANCE

## 2022-12-07 ENCOUNTER — HOSPITAL ENCOUNTER (OUTPATIENT)
Dept: RADIOLOGY | Facility: HOSPITAL | Age: 47
Discharge: HOME | End: 2022-12-07

## 2022-12-07 DIAGNOSIS — M54.2 NECK PAIN: ICD-10-CM

## 2022-12-12 DIAGNOSIS — M48.02 CERVICAL SPINAL STENOSIS: Primary | ICD-10-CM

## 2022-12-13 ENCOUNTER — TELEPHONE (OUTPATIENT)
Dept: FAMILY MEDICINE CLINIC | Facility: CLINIC | Age: 47
End: 2022-12-13

## 2022-12-13 DIAGNOSIS — M48.02 CERVICAL SPINAL STENOSIS: Primary | ICD-10-CM

## 2022-12-13 NOTE — RESULT ENCOUNTER NOTE
Discussed x-ray findings with patient  Notified of pain management referral  Will have our referral team find available appointment      Dr Kavon Roy

## 2022-12-13 NOTE — TELEPHONE ENCOUNTER
Hello Goodmorning , pain management does intake question to patient directly  Also, can the order be placed I cannot see it on referral patient chart

## 2022-12-14 ENCOUNTER — OFFICE VISIT (OUTPATIENT)
Dept: OBGYN CLINIC | Facility: MEDICAL CENTER | Age: 47
End: 2022-12-14

## 2022-12-14 VITALS
BODY MASS INDEX: 30.18 KG/M2 | SYSTOLIC BLOOD PRESSURE: 107 MMHG | WEIGHT: 164 LBS | HEIGHT: 62 IN | HEART RATE: 81 BPM | DIASTOLIC BLOOD PRESSURE: 70 MMHG

## 2022-12-14 DIAGNOSIS — M79.672 HEEL PAIN, BILATERAL: Primary | ICD-10-CM

## 2022-12-14 DIAGNOSIS — M72.2 PLANTAR FASCIITIS: ICD-10-CM

## 2022-12-14 DIAGNOSIS — M79.671 HEEL PAIN, BILATERAL: Primary | ICD-10-CM

## 2022-12-14 NOTE — PATIENT INSTRUCTIONS
Recommend Spenco arch supports (green and black) on [place] or their website www spenco impl[employer] com  Wean into wearing them, as your feet will need to get use to them  You may use Advil (ibuprofen) 600mg every 6 hours or at least twice per day OR Aleve (naproxen) 250-500mg every 12 hours as needed for pain and inflammation  You may also take Tylenol 500mg every 4-6 hours as needed OR max 1,000mg per dose up to 3 times per day for a total of 3,000mg per day  Check with your primary care physician to see if these medications are safe to take and to make sure they do not interfere with your other medications and medical issues

## 2022-12-14 NOTE — PROGRESS NOTES
Assessment/Plan:    Diagnoses and all orders for this visit:    Heel pain, bilateral  -     Ambulatory Referral to Physical Therapy; Future    Plantar fasciitis  -     Ambulatory Referral to Sports Medicine  -     Ambulatory Referral to Physical Therapy; Future    Other orders  -     IBUPROFEN PO; Take by mouth    Will try PT, arch supports and NSAIDs declines steroid injections at this time  If no improvement t/c CSIs  Recommend Spenco arch supports (green and black) on [employer] or their website www spenco impl[employer] com  Wean into wearing them, as your feet will need to get use to them  You may use Advil (ibuprofen) 600mg every 6 hours or at least twice per day OR Aleve (naproxen) 250-500mg every 12 hours as needed for pain and inflammation  You may also take Tylenol 500mg every 4-6 hours as needed OR max 1,000mg per dose up to 3 times per day for a total of 3,000mg per day  Check with your primary care physician to see if these medications are safe to take and to make sure they do not interfere with your other medications and medical issues  Return in about 5 weeks (around 2023)  Chief Complaint:     Chief Complaint   Patient presents with   • Left Foot - Pain   • Right Foot - Pain       Subjective:   Patient ID: Freddie Galeana is a 52 y o  female  NP presents for b/l heel pain x 5 months  She did undergo prior Left PF CSI 2020  She is taking Artrifan Roger and Ibuprofen  Review of Systems    The following portions of the patient's chart were reviewed and updated as appropriate:    Allergy:  No Known Allergies      Past Medical History:   Diagnosis Date   • Carpal tunnel syndrome of left wrist 2022   • Carpal tunnel syndrome on right 2021   • Depression    • Does not have health insurance 3/22/2021   • Hypothyroid    • No known health problems    • Varicose veins of both lower extremities        Past Surgical History:   Procedure Laterality Date   •  SECTION     • FL INJECTION RIGHT HIP (NON ARTHROGRAM)  02/10/2021   • HERNIA REPAIR     • OVARIAN CYST SURGERY      at 15 yo       Social History     Socioeconomic History   • Marital status: Single     Spouse name: Not on file   • Number of children: Not on file   • Years of education: Not on file   • Highest education level: Not on file   Occupational History   • Not on file   Tobacco Use   • Smoking status: Never   • Smokeless tobacco: Never   Vaping Use   • Vaping Use: Never used   Substance and Sexual Activity   • Alcohol use: No   • Drug use: No   • Sexual activity: Not Currently     Partners: Male   Other Topics Concern   • Not on file   Social History Narrative    No preference on Restorationism beliefs     Social Determinants of Health     Financial Resource Strain: Low Risk    • Difficulty of Paying Living Expenses: Not hard at all   Food Insecurity: No Food Insecurity   • Worried About Running Out of Food in the Last Year: Never true   • Ran Out of Food in the Last Year: Never true   Transportation Needs: No Transportation Needs   • Lack of Transportation (Medical): No   • Lack of Transportation (Non-Medical): No   Physical Activity: Insufficiently Active   • Days of Exercise per Week: 2 days   • Minutes of Exercise per Session: 20 min   Stress: No Stress Concern Present   • Feeling of Stress : Not at all   Social Connections: Socially Isolated   • Frequency of Communication with Friends and Family: Three times a week   • Frequency of Social Gatherings with Friends and Family:  Three times a week   • Attends Judaism Services: Never   • Active Member of Clubs or Organizations: No   • Attends Club or Organization Meetings: Never   • Marital Status: Never    Intimate Partner Violence: Not At Risk   • Fear of Current or Ex-Partner: No   • Emotionally Abused: No   • Physically Abused: No   • Sexually Abused: No   Housing Stability: Unknown   • Unable to Pay for Housing in the Last Year: No   • Number of Places Lived in the Last Year: Not on file   • Unstable Housing in the Last Year: No       Family History   Problem Relation Age of Onset   • Diabetes Mother    • Heart failure Father    • No Known Problems Sister    • No Known Problems Daughter    • No Known Problems Maternal Grandfather    • No Known Problems Paternal Grandmother    • No Known Problems Paternal Grandfather    • No Known Problems Brother    • No Known Problems Brother    • No Known Problems Brother    • No Known Problems Brother    • No Known Problems Brother    • No Known Problems Son    • No Known Problems Maternal Aunt    • No Known Problems Maternal Aunt    • No Known Problems Maternal Aunt    • No Known Problems Paternal Aunt    • No Known Problems Paternal Aunt    • Diabetes Other    • Hyperlipidemia Other         pure       Medications:    Current Outpatient Medications:   •  cholecalciferol (VITAMIN D3) 1,000 units tablet, Take 1 tablet (1,000 Units total) by mouth daily, Disp: 30 tablet, Rfl: 2  •  IBUPROFEN PO, Take by mouth, Disp: , Rfl:   •  levothyroxine (Synthroid) 25 mcg tablet, Take 1 tablet (25 mcg total) by mouth daily in the early morning, Disp: 90 tablet, Rfl: 3  •  pantoprazole (PROTONIX) 20 mg tablet, Take 1 tablet (20 mg total) by mouth daily, Disp: 30 tablet, Rfl: 2  •  acetaminophen (TYLENOL) 500 mg tablet, Take 2 tablets (1,000 mg total) by mouth every 6 (six) hours as needed for moderate pain (Patient not taking: Reported on 12/14/2022), Disp: 90 tablet, Rfl: 3  •  DULoxetine (Cymbalta) 30 mg delayed release capsule, Take 1 capsule (30 mg total) by mouth daily (Patient not taking: Reported on 12/14/2022), Disp: 60 capsule, Rfl: 0  •  ibuprofen (MOTRIN) 600 mg tablet, Take 1 tablet (600 mg total) by mouth every 6 (six) hours as needed for mild pain for up to 14 days, Disp: 30 tablet, Rfl: 0  •  methocarbamol (Robaxin-750) 750 mg tablet, Take 1 tablet (750 mg total) by mouth every 12 (twelve) hours as needed for muscle spasms (Patient not taking: Reported on 12/14/2022), Disp: 30 tablet, Rfl: 0    Patient Active Problem List   Diagnosis   • Vitamin D deficiency   • Chronic fatigue   • Cervical spinal stenosis   • Chronic pain of both knees   • Pterygium eye, left   • Thyromegaly   • Subclinical hypothyroidism   • Carpal tunnel syndrome on both sides   • Plantar fasciitis       Objective:  /70   Pulse 81   Ht 5' 2" (1 575 m)   Wt 74 4 kg (164 lb)   BMI 30 00 kg/m²     Right Ankle Exam     Comments:  Antalgic gait  Tenderness to bilateral heels            Physical Exam      Neurologic Exam    Procedures    There are no pertinent studies obtained with regards to today's office visit

## 2022-12-20 ENCOUNTER — TELEPHONE (OUTPATIENT)
Dept: PAIN MEDICINE | Facility: MEDICAL CENTER | Age: 47
End: 2022-12-20

## 2022-12-30 DIAGNOSIS — E03.8 SUBCLINICAL HYPOTHYROIDISM: ICD-10-CM

## 2023-01-04 RX ORDER — LEVOTHYROXINE SODIUM 0.03 MG/1
25 TABLET ORAL
Qty: 60 TABLET | Refills: 0 | Status: SHIPPED | OUTPATIENT
Start: 2023-01-04

## 2023-01-18 ENCOUNTER — OFFICE VISIT (OUTPATIENT)
Dept: OBGYN CLINIC | Facility: MEDICAL CENTER | Age: 48
End: 2023-01-18

## 2023-01-18 VITALS
HEIGHT: 62 IN | SYSTOLIC BLOOD PRESSURE: 113 MMHG | BODY MASS INDEX: 30.91 KG/M2 | WEIGHT: 168 LBS | HEART RATE: 78 BPM | DIASTOLIC BLOOD PRESSURE: 72 MMHG

## 2023-01-18 DIAGNOSIS — M79.671 HEEL PAIN, BILATERAL: Primary | ICD-10-CM

## 2023-01-18 DIAGNOSIS — M79.672 HEEL PAIN, BILATERAL: Primary | ICD-10-CM

## 2023-01-18 DIAGNOSIS — M72.2 PLANTAR FASCIITIS: ICD-10-CM

## 2023-01-18 RX ORDER — BUPIVACAINE HYDROCHLORIDE 5 MG/ML
1 INJECTION, SOLUTION PERINEURAL
Status: COMPLETED | OUTPATIENT
Start: 2023-01-18 | End: 2023-01-18

## 2023-01-18 RX ORDER — METHYLPREDNISOLONE ACETATE 40 MG/ML
1 INJECTION, SUSPENSION INTRA-ARTICULAR; INTRALESIONAL; INTRAMUSCULAR; SOFT TISSUE
Status: COMPLETED | OUTPATIENT
Start: 2023-01-18 | End: 2023-01-18

## 2023-01-18 RX ADMIN — BUPIVACAINE HYDROCHLORIDE 1 ML: 5 INJECTION, SOLUTION PERINEURAL at 13:47

## 2023-01-18 RX ADMIN — METHYLPREDNISOLONE ACETATE 1 ML: 40 INJECTION, SUSPENSION INTRA-ARTICULAR; INTRALESIONAL; INTRAMUSCULAR; SOFT TISSUE at 13:47

## 2023-01-18 NOTE — PROGRESS NOTES
Assessment/Plan:    Diagnoses and all orders for this visit:    Heel pain, bilateral  -     Cancel: Injection tendon origin insert single; Future  -     Injection tendon origin insert single; Future    Plantar fasciitis  -     Cancel: Injection tendon origin insert single; Future  -     Injection tendon origin insert single; Future    B/L Heel CSIs  IF no improvement will refer to [address] [place] [address] [id number] used for entire encounter    Return if symptoms worsen or fail to improve  Chief Complaint:     Chief Complaint   Patient presents with   • Left Foot - Follow-up   • Right Foot - Follow-up       Subjective:   Patient ID: Chase Lawler is a 52 y o  female  Patient returns with continued symptoms of b/l heel pain, she did not start PT due to insurance issues  Previous note:  NP presents for b/l heel pain x 5 months  She did undergo prior Left PF CSI 2020  She is taking Artrifan Roger and Ibuprofen  Review of Systems    The following portions of the patient's chart were reviewed and updated as appropriate:    Allergy:  No Known Allergies      Past Medical History:   Diagnosis Date   • Carpal tunnel syndrome of left wrist 2022   • Carpal tunnel syndrome on right 2021   • Depression    • Does not have health insurance 3/22/2021   • Hypothyroid    • No known health problems    • Varicose veins of both lower extremities        Past Surgical History:   Procedure Laterality Date   •  SECTION     • FL INJECTION RIGHT HIP (NON ARTHROGRAM)  02/10/2021   • HERNIA REPAIR     • OVARIAN CYST SURGERY      at 15 yo       Social History     Socioeconomic History   • Marital status: Single     Spouse name: Not on file   • Number of children: Not on file   • Years of education: Not on file   • Highest education level: Not on file   Occupational History   • Not on file   Tobacco Use   • Smoking status: Never   • Smokeless tobacco: Never   Vaping Use   • Vaping Use: Never used Substance and Sexual Activity   • Alcohol use: No   • Drug use: No   • Sexual activity: Not Currently     Partners: Male   Other Topics Concern   • Not on file   Social History Narrative    No preference on Nondenominational beliefs     Social Determinants of Health     Financial Resource Strain: Low Risk    • Difficulty of Paying Living Expenses: Not hard at all   Food Insecurity: No Food Insecurity   • Worried About Running Out of Food in the Last Year: Never true   • Ran Out of Food in the Last Year: Never true   Transportation Needs: No Transportation Needs   • Lack of Transportation (Medical): No   • Lack of Transportation (Non-Medical): No   Physical Activity: Insufficiently Active   • Days of Exercise per Week: 2 days   • Minutes of Exercise per Session: 20 min   Stress: No Stress Concern Present   • Feeling of Stress : Not at all   Social Connections: Socially Isolated   • Frequency of Communication with Friends and Family: Three times a week   • Frequency of Social Gatherings with Friends and Family:  Three times a week   • Attends Adventist Services: Never   • Active Member of Clubs or Organizations: No   • Attends Club or Organization Meetings: Never   • Marital Status: Never    Intimate Partner Violence: Not At Risk   • Fear of Current or Ex-Partner: No   • Emotionally Abused: No   • Physically Abused: No   • Sexually Abused: No   Housing Stability: Unknown   • Unable to Pay for Housing in the Last Year: No   • Number of Places Lived in the Last Year: Not on file   • Unstable Housing in the Last Year: No       Family History   Problem Relation Age of Onset   • Diabetes Mother    • Heart failure Father    • No Known Problems Sister    • No Known Problems Daughter    • No Known Problems Maternal Grandfather    • No Known Problems Paternal Grandmother    • No Known Problems Paternal Grandfather    • No Known Problems Brother    • No Known Problems Brother    • No Known Problems Brother    • No Known Problems Brother    • No Known Problems Brother    • No Known Problems Son    • No Known Problems Maternal Aunt    • No Known Problems Maternal Aunt    • No Known Problems Maternal Aunt    • No Known Problems Paternal Aunt    • No Known Problems Paternal Aunt    • Diabetes Other    • Hyperlipidemia Other         pure       Medications:    Current Outpatient Medications:   •  cholecalciferol (VITAMIN D3) 1,000 units tablet, Take 1 tablet (1,000 Units total) by mouth daily, Disp: 30 tablet, Rfl: 2  •  IBUPROFEN PO, Take by mouth, Disp: , Rfl:   •  levothyroxine (Synthroid) 25 mcg tablet, Take 1 tablet (25 mcg total) by mouth daily in the early morning, Disp: 60 tablet, Rfl: 0  •  acetaminophen (TYLENOL) 500 mg tablet, Take 2 tablets (1,000 mg total) by mouth every 6 (six) hours as needed for moderate pain (Patient not taking: Reported on 12/14/2022), Disp: 90 tablet, Rfl: 3  •  DULoxetine (Cymbalta) 30 mg delayed release capsule, Take 1 capsule (30 mg total) by mouth daily (Patient not taking: Reported on 12/14/2022), Disp: 60 capsule, Rfl: 0  •  ibuprofen (MOTRIN) 600 mg tablet, Take 1 tablet (600 mg total) by mouth every 6 (six) hours as needed for mild pain for up to 14 days, Disp: 30 tablet, Rfl: 0  •  methocarbamol (Robaxin-750) 750 mg tablet, Take 1 tablet (750 mg total) by mouth every 12 (twelve) hours as needed for muscle spasms (Patient not taking: Reported on 12/14/2022), Disp: 30 tablet, Rfl: 0  •  pantoprazole (PROTONIX) 20 mg tablet, Take 1 tablet (20 mg total) by mouth daily, Disp: 30 tablet, Rfl: 2    Patient Active Problem List   Diagnosis   • Vitamin D deficiency   • Chronic fatigue   • Cervical spinal stenosis   • Chronic pain of both knees   • Pterygium eye, left   • Thyromegaly   • Subclinical hypothyroidism   • Carpal tunnel syndrome on both sides   • Plantar fasciitis       Objective:  /72   Pulse 78   Ht 5' 2" (1 575 m)   Wt 76 2 kg (168 lb)   BMI 30 73 kg/m²     Ortho Exam    Physical Exam      Neurologic Exam      Large joint arthrocentesis  Universal Protocol:  Consent: Verbal consent obtained  Risks and benefits: risks, benefits and alternatives were discussed  Consent given by: patient  Time out: Immediately prior to procedure a "time out" was called to verify the correct patient, procedure, equipment, support staff and site/side marked as required  Timeout called at: 1/18/2023 12:00 PM   Site marked: the operative site was marked  Patient identity confirmed: verbally with patient    Supporting Documentation  Indications: pain   Procedure Details  Location: B/L Plantar fascia origins    Preparation: Patient was prepped and draped in the usual sterile fashion  Needle size: 22 G  Ultrasound guidance: no  Approach: medial  Medications administered: 1 mL bupivacaine 0 5 %; 1 mL methylPREDNISolone acetate 40 mg/mL    Patient tolerance: patient tolerated the procedure well with no immediate complications  Dressing:  Sterile dressing applied    BILATERAL

## 2023-01-18 NOTE — PATIENT INSTRUCTIONS
You may use Advil (ibuprofen) 600mg every 6 hours or at least twice per day OR Aleve (naproxen) 250-500mg every 12 hours as needed for pain and inflammation  You may also take Tylenol 500mg every 4-6 hours as needed OR max 1,000mg per dose up to 3 times per day for a total of 3,000mg per day  Check with your primary care physician to see if these medications are safe to take and to make sure they do not interfere with your other medications and medical issues  Fascitis plantar   LO QUE NECESITA SABER:   es la hinchazón de la fascia plantar  La fascia plantar es amira up gruesa de tejido que conecta el hueso del talón a los dedos del pie  Esta parte del pie ayuda a brindar soporte al arco del pie y a amortiguar los golpes  INSTRUCCIONES SOBRE EL DINA HOSPITALARIA:   Llame a arechiga médico si:  El dolor o la hinchazón aumentan repentinamente  Presenta dolor de rodilla, cadera o espalda  Usted tiene preguntas o inquietudes acerca de arechiga condición o cuidado  Medicamentos: Es posible que usted necesite alguno de los siguientes:  Acetaminofén more el dolor y baja la fiebre  Está disponible sin receta médica  Pregunte la cantidad y la frecuencia con que debe tomarlos  [facility]  Cindi las etiquetas de todos los demás medicamentos que esté usando para saber si también contienen acetaminofén, o pregunte a arechiga médico o farmacéutico  El acetaminofén puede causar daño en el hígado cuando no se javy de forma correcta  No use más de 4 gramos (4000 miligramos) en total de acetaminofeno en un día  Los [place], kandace el ibuprofeno, [facility] a disminuir la inflamación, el dolor y la [place]  Los ZEINAB pueden causar sangrado estomacal o problemas renales en ciertas personas  Si usted javy un medicamento anticoagulante, siempre pregúntele a arechiga médico si los ZEINAB son seguros para usted  Siempre cindi la etiqueta de shena medicamento y [place] instrucciones  Darfur ashley medicamentos kandace se le haya indicado   Consulte con arechiga médico si usted christine que arechiga medicamento no le está ayudando o si presenta efectos secundarios  Infórmele si es alérgico a algún medicamento  Mantenga amira lista actualizada de los [place], las vitaminas y los productos herbales que javy  Incluya los siguientes datos de los medicamentos: cantidad, frecuencia y motivo de administración  Traiga con usted la lista o los envases de las píldoras a ashley citas de seguimiento  Lleve la lista de los medicamentos con usted en cornelio de amira emergencia  Cuidados personales:  Use la férula o las plantillas kandace se le indique  Es posible que usted necesite usar amira férula por la noche para mantener el pie estirado mientras duerme  [place] ayudará a evitar el dolor margo temprano por la [place]  Las plantillas ayudarán a disminuir la tensión en la fascia plantar al caminar o hacer ejercicio  Aplique hielo sobre la fascia plantar  El hielo ayuda a evitar daño al tejido y a disminuir la inflamación y el dolor  Llene amira botella de agua con agua y congélela  Envuelva amira toalla alrededor de la botella o cúbrala con amira jeanna de [place]  Ruede la botella del agua bajo el pie por 10 minutos en la mañana y después del [place]  Masaje de la fascia plantar kandace le indicaron  [place] podría ayudar a disminuir la hinchazón y el dolor  Ruede amira pelota de golf debajo de los pies linda 10 minutos  Ag esto 3 veces al día  Vaya a terapia física según indicaciones  Un fisioterapeuta le puede enseñar ejercicios para ayudarle a mejorar el movimiento y la fuerza, y para disminuir el dolor  Evite la fascitis plantar:  Mantenga un peso saludable  [place] ayudará a disminuir la [place]  Consulte con arechiga médico cuánto debería pesar  Pida que le ayude a crear un plan para bajar de peso si usted tiene sobrepeso  Ag ejercicios de bajo impacto  Los ejercicios de bajo impacto disminuyen la tensión en la fascia plantar  Por ejemplo, nadar o andar en bicicleta      Comience nuevas actividades lentamente  Aumente gradualmente la intensidad y la duración  Use calzado que le quede eva y que brinde soporte al arco  Reemplace el calzado antes de que la plantilla o el amortiguador de golpes se desgaste  Evite caminar o pararse descalzo o en sandalias por largos períodos de tiempo  Acuda a la consulta de control con arechiga médico según las indicaciones: Anote ashley preguntas para que se acuerde de hacerlas linda ashley visitas  © Copyright [employer] 2022 Information is for End User's use only and may not be sold, redistributed or otherwise used for commercial purposes  All illustrations and images included in CareNotes® are the copyrighted property of [employer]  or [address] [place] [address] es sólo para uso en educación  Arechiga intención no es darle un consejo médico sobre enfermedades o tratamientos  Colsulte con arechiga [employer] farmacéutico antes de seguir cualquier régimen médico para saber si es seguro y efectivo para usted

## 2023-02-26 PROBLEM — E01.0 THYROMEGALY: Status: RESOLVED | Noted: 2022-04-11 | Resolved: 2023-02-26

## 2023-02-26 PROBLEM — H11.002 PTERYGIUM EYE, LEFT: Status: RESOLVED | Noted: 2021-03-22 | Resolved: 2023-02-26

## 2023-02-26 NOTE — ASSESSMENT & PLAN NOTE
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   Previous RF, anti-ccp, FILOMENA, lyme panel, ESR, CRP all negative in 9/2021  ESR, CRP (10/24/22) negative  TSH last in 7/2022 was normal  Previously prescribed duloxetine and robaxin, but patient preferred to continue taking artriifin; Have discussed taking protonix daily given NSAID component and prolonged use of this medication       Giving consideration for fibromyalgia  Advise ice compresses  Went over upper back exercises with patient and printed in AVS  Physical therapy referral: low impact exercise, aquatherapy  Encourage massages if able to afford  Follow up for annual physical in 3 months

## 2023-02-27 ENCOUNTER — OFFICE VISIT (OUTPATIENT)
Dept: FAMILY MEDICINE CLINIC | Facility: CLINIC | Age: 48
End: 2023-02-27

## 2023-02-27 VITALS
SYSTOLIC BLOOD PRESSURE: 122 MMHG | DIASTOLIC BLOOD PRESSURE: 80 MMHG | TEMPERATURE: 97.7 F | HEART RATE: 86 BPM | WEIGHT: 170 LBS | BODY MASS INDEX: 31.28 KG/M2 | HEIGHT: 62 IN | RESPIRATION RATE: 16 BRPM | OXYGEN SATURATION: 97 %

## 2023-02-27 DIAGNOSIS — E03.8 SUBCLINICAL HYPOTHYROIDISM: ICD-10-CM

## 2023-02-27 DIAGNOSIS — M79.10 MYALGIA: Primary | ICD-10-CM

## 2023-02-27 RX ORDER — METHOCARBAMOL 750 MG/1
750 TABLET, FILM COATED ORAL EVERY 12 HOURS PRN
Qty: 30 TABLET | Refills: 0 | Status: SHIPPED | OUTPATIENT
Start: 2023-02-27

## 2023-02-27 RX ORDER — LIDOCAINE 40 MG/G
CREAM TOPICAL AS NEEDED
Qty: 30 G | Refills: 0 | Status: SHIPPED | OUTPATIENT
Start: 2023-02-27

## 2023-02-27 NOTE — ASSESSMENT & PLAN NOTE
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     Giving consideration for fibromyalgia  Previous RF, anti-ccp, FILOMENA, lyme panel, ESR, CRP all negative in 9/2021  ESR, CRP 10/24/22 negative  TSH last in 7/2022 was normal  Ibuprofen not helpful per patient; topical analgesics were "ok"    Previously prescribed duloxetine and robaxin, but patient states the pharmacy did not dispense     Discussed importance of exercise therapy: low impact exercise, aquatherapy  Physical therapy referral difficult due to no insurance coverage  Went over upper back exercises with patient and printed in AVS    Advise ice compresses; encouraged massages  Will send lidocaine cream and short course of robaxin for marked spasm  She would like to revisit possibly starting cymbalta at another visit  Follow up for annual physical in 3 months

## 2023-02-27 NOTE — PATIENT INSTRUCTIONS
Upper Back Exercises   AMBULATORY CARE:   Upper back exercises  help heal and strengthen your back muscles and prevent another injury  Ask your healthcare provider if you need to see a physical therapist for more advanced exercises  Seek care immediately if:   You have severe pain that prevents you from moving  Call your doctor if:   Your pain becomes worse  You have new pain  You have questions or concerns about your condition, care, or exercise program     What you need to know about exercise safety:   Do the exercises on a mat or firm surface (not on a bed)  A firm surface will support your spine and prevent upper back pain  Move slowly and smoothly  Avoid fast or jerky motions  Breathe normally  Do not hold your breath  Stop if you feel pain  It is normal to feel some discomfort at first, but you should not feel pain  Regular exercise will help decrease your discomfort over time  Perform upper back exercises safely:  Ask your healthcare provider which of the following exercises are best for you and how often to do them  Head rolls:  Sit in a chair or stand  Bring your chin toward your chest and roll your head to the right  Your ear should be over your shoulder  Hold this position for 5 seconds  Roll your head back toward your chest and to the left  Your ear should be over your left shoulder  Hold this position for 5 seconds  Next, roll your head back slowly in a clockwise [place] and repeat 3 times  Do 3 sets of head rolls  Scapular squeeze:  Sit or stand with your arms at your sides  Squeeze your shoulder blades together and hold for 3 seconds  Relax and repeat 3 times  Pectoralis stretch:   a doorway  Lift your hands and place them on each side of the door frame or wall slightly higher than your head  Lean forward slowly until you feel a gentle stretch  Hold for 15 seconds  Repeat 3 times, or as directed           Cat and camel exercise:  Place your hands and knees on the floor  Arch your back upward toward the ceiling and lower your head  Round out your spine as much as you can  Hold for 5 seconds  Lift your head upward and push your chest downward toward the floor  Hold for 5 seconds  Do 3 sets or as directed  Bird dog:  Place your hands and knees on the floor  Keep your wrists directly below your shoulders and your knees directly below your hips  Pull your belly button in toward your spine  Do not flatten or arch your back  Tighten your abdominal muscles  Raise one arm straight out so that it is aligned with your head  Next, raise the leg opposite your arm  Hold this position for 15 seconds  Lower your arm and leg slowly and change sides  Do 5 sets  Follow up with your doctor as directed:  Write down your questions so you remember to ask them during your visits  © Copyright Nenita Gregory 2022 Information is for End User's use only and may not be sold, redistributed or otherwise used for commercial purposes  The above information is an  only  It is not intended as medical advice for individual conditions or treatments  Talk to your doctor, nurse or pharmacist before following any medical regimen to see if it is safe and effective for you

## 2023-06-01 ENCOUNTER — TELEPHONE (OUTPATIENT)
Dept: FAMILY MEDICINE CLINIC | Facility: CLINIC | Age: 48
End: 2023-06-01

## 2023-06-01 NOTE — TELEPHONE ENCOUNTER
06/01/23    Buenos días  Tengo amira llamada, tengo 2 días de estar llamando y nadie me responde las llamadas, no sé, es amira clínica, no sé por qué no responden llamadas  ¿Tenía amira jenna hoy? Manuelito Caras la quiero cancelar de Monica Clipper  Se los dejo saber para que no me manden después amira carta que no asistí  El así a la jenna  Mi nombre es Mary cordón  Y mi fecha nacimiento de [age]ac[age], es el [birth year] del [birth year]  ¿¿Te lo dejo saber para no recibir un solo sobre porque tengo 2 días de llamar y dejar mensajes y nadie responde, no sé por qué es amira clínica y si no responden amira llamada bendiciones? Michael brown Pt     Informed pt the reason of my call    Pt wanted to reschedule 06/01/23 appt       appt has been rescheduled 06/14/23 with Azeri speaking PCP pet Pt Request

## 2023-06-12 ENCOUNTER — LAB (OUTPATIENT)
Dept: LAB | Facility: CLINIC | Age: 48
End: 2023-06-12
Payer: COMMERCIAL

## 2023-06-12 DIAGNOSIS — E55.9 VITAMIN D DEFICIENCY: ICD-10-CM

## 2023-06-12 DIAGNOSIS — E03.8 SUBCLINICAL HYPOTHYROIDISM: ICD-10-CM

## 2023-06-12 LAB
25(OH)D3 SERPL-MCNC: 23 NG/ML (ref 30–100)
TSH SERPL DL<=0.05 MIU/L-ACNC: 3.46 UIU/ML (ref 0.45–4.5)

## 2023-06-12 PROCEDURE — 36415 COLL VENOUS BLD VENIPUNCTURE: CPT

## 2023-06-12 PROCEDURE — 82306 VITAMIN D 25 HYDROXY: CPT

## 2023-06-12 PROCEDURE — 84443 ASSAY THYROID STIM HORMONE: CPT

## 2023-06-14 ENCOUNTER — TELEPHONE (OUTPATIENT)
Dept: OTHER | Facility: OTHER | Age: 48
End: 2023-06-14

## 2023-06-14 NOTE — TELEPHONE ENCOUNTER
Patient is calling regarding cancelling an appointment  Date/Time:06-14-23 @ 0800    Patient was rescheduled: YES [] NO [x]    Patient requesting call back to reschedule: YES [x] NO []    Patient is at the hospital this morning  Her cousin passed away, and she will need to reschedule

## 2023-06-17 DIAGNOSIS — E03.8 SUBCLINICAL HYPOTHYROIDISM: ICD-10-CM

## 2023-06-17 DIAGNOSIS — E55.9 VITAMIN D DEFICIENCY: ICD-10-CM

## 2023-06-17 RX ORDER — MELATONIN
1000 DAILY
Qty: 90 TABLET | Refills: 1 | Status: SHIPPED | OUTPATIENT
Start: 2023-06-17

## 2023-06-17 NOTE — RESULT ENCOUNTER NOTE
Please inform patient that her vitamin D level has started to trend down  She is NOT severely low, however, since she does have a hx of vitamin D deficiency needing supplementation in the past, I have refilled her daily vitamin D  Thank you

## 2023-06-19 ENCOUNTER — OFFICE VISIT (OUTPATIENT)
Dept: FAMILY MEDICINE CLINIC | Facility: CLINIC | Age: 48
End: 2023-06-19

## 2023-06-19 VITALS
DIASTOLIC BLOOD PRESSURE: 74 MMHG | BODY MASS INDEX: 30.73 KG/M2 | HEART RATE: 78 BPM | WEIGHT: 167 LBS | OXYGEN SATURATION: 98 % | HEIGHT: 62 IN | RESPIRATION RATE: 18 BRPM | TEMPERATURE: 97.8 F | SYSTOLIC BLOOD PRESSURE: 116 MMHG

## 2023-06-19 DIAGNOSIS — M79.10 MYALGIA: ICD-10-CM

## 2023-06-19 DIAGNOSIS — J02.9 ALLERGIC PHARYNGITIS: ICD-10-CM

## 2023-06-19 DIAGNOSIS — E03.8 SUBCLINICAL HYPOTHYROIDISM: ICD-10-CM

## 2023-06-19 DIAGNOSIS — Z00.00 ANNUAL PHYSICAL EXAM: Primary | ICD-10-CM

## 2023-06-19 PROCEDURE — 99396 PREV VISIT EST AGE 40-64: CPT

## 2023-06-19 RX ORDER — CETIRIZINE HYDROCHLORIDE 10 MG/1
10 TABLET ORAL DAILY
Qty: 60 TABLET | Refills: 0 | Status: SHIPPED | OUTPATIENT
Start: 2023-06-19 | End: 2023-08-18

## 2023-06-19 RX ORDER — LEVOTHYROXINE SODIUM 0.03 MG/1
25 TABLET ORAL
Qty: 60 TABLET | Refills: 0 | Status: SHIPPED | OUTPATIENT
Start: 2023-06-19

## 2023-06-19 NOTE — PROGRESS NOTES
[address] [facility]    NAME: Mary Nj  AGE: 50 y o  SEX: female  : 1975     DATE: 2023     Assessment and Plan:     Problem List Items Addressed This Visit        Digestive    Allergic pharyngitis     Patient reports seasonal non productive cough worse at night  No associated symptoms of fever, chills, nasal congestion  Cobblestoning present on pharynx consisted with allergic pharyngitis  Start Zyrtec 10 mg daily         Relevant Medications    diclofenac sodium (VOLTAREN) 50 mg EC tablet    cetirizine (ZyrTEC) 10 mg tablet       Endocrine    Subclinical hypothyroidism     Lab Results   Component Value Date    [id number] 3 456 2023     Continue with levothyroxine 25 mcg daily          Relevant Medications    levothyroxine (Synthroid) 25 mcg tablet       Other    Myalgia     Patient reports chronic bone/joint pain with intermittent swelling  ESR/CRP in  - neg   Will check inflammatory markers r/o autoimmune disease  Start diclofenac 50 mg daily   Continue with robaxin 750 prn, lidocaine cream prn and acetaminophen prn          Relevant Medications    diclofenac sodium (VOLTAREN) 50 mg EC tablet    Other Relevant Orders    RF Screen w/ Reflex to Titer    Cyclic citrul peptide antibody, IgG    FILOMENA Screen w/ Reflex to Titer/Pattern    C-reactive protein   Other Visit Diagnoses     Annual physical exam    -  Primary    BMI 30 0-30 9,adult        Relevant Orders    CBC and differential    Iron Panel (Includes Ferritin, Iron Sat%, Iron, and TIBC)          Immunizations and preventive care screenings were discussed with patient today  Appropriate education was printed on patient's after visit summary  Counseling:  Alcohol/drug use: discussed moderation in alcohol intake, the recommendations for healthy alcohol use, and avoidance of illicit drug use    Dental Health: discussed importance of regular tooth brushing, flossing, and dental visits  Injury prevention: discussed safety/seat belts, safety helmets, smoke detectors, carbon dioxide detectors, and smoking near bedding or upholstery  Sexual health: discussed sexually transmitted diseases, partner selection, use of condoms, avoidance of unintended pregnancy, and contraceptive alternatives  · Exercise: the importance of regular exercise/physical activity was discussed  Recommend exercise 3-5 times per week for at least 30 minutes  BMI Counseling: Body mass index is 30 54 kg/m²  The BMI is above normal  Nutrition recommendations include decreasing portion sizes, encouraging healthy choices of fruits and vegetables, decreasing fast food intake, consuming healthier snacks, limiting drinks that contain sugar, moderation in carbohydrate intake, increasing intake of lean protein, reducing intake of saturated and trans fat and reducing intake of cholesterol  Exercise recommendations include exercising 3-5 times per week  No pharmacotherapy was ordered  Rationale for BMI follow-up plan is due to patient being overweight or obese  Return in about 6 months (around 12/19/2023)  Chief Complaint:     Chief Complaint   Patient presents with   • Physical Exam      History of Present Illness:     Adult Annual Physical   Patient here for a comprehensive physical exam  The patient reports problems - bone pain   Mariia Mad 50 y  o female  has a past medical history of Carpal tunnel syndrome of left wrist (4/11/2022), Carpal tunnel syndrome on right (8/9/2021), Depression, Does not have health insurance (3/22/2021), Hypothyroid, No known health problems, Thyromegaly (4/11/2022), and Varicose veins of both lower extremities  Presenting today   Patient currently works cleaning homes, and reports daily generalized joint pain with intermittent swelling of the hands   Has been on duloxetine 30 mg, robaxin 750 prn, ibuprofen 600 prn and seen by pain management with mild relief of symptoms  Denies fatigue, headaches, dizziness, blurred vision, nausea, palpitation, chest pain, SOB, urinary changes, weakness, bowel changes, sleep problems,  sick contacts, red flag signs,  or recent travel  Overall patient reports feeling well   Patient has no further complaints other than what is mentioned in the ROS  Generalized Body Aches  The current episode started more than 1 year ago  The problem occurs daily  The problem has been waxing and waning since onset  The pain is moderate  The symptoms are aggravated by activity and movement  Associated symptoms include joint pain, joint swelling and muscle aches  Pertinent negatives include no decreased vision, ear discharge, ear pain, eye pain, headaches, photophobia, rhinorrhea, sore throat, fatigue, fever, chest pain, coughing, shortness of breath, abdominal pain, vomiting or rash  Past treatments include one or more OTC medications and one or more prescription drugs  The treatment provided mild relief  She has been eating and drinking normally  Urine output has been normal  There were no sick contacts  Cough  This is a chronic problem  The current episode started more than 1 year ago  The problem has been waxing and waning  Episode frequency: at night  The cough is non-productive  Pertinent negatives include no chest pain, chills, ear pain, fever, headaches, nasal congestion, postnasal drip, rash, rhinorrhea, sore throat or shortness of breath  The symptoms are aggravated by pollens (seasonal )  She has tried nothing for the symptoms  The treatment provided no relief  Diet and Physical Activity  · Diet/Nutrition: well balanced diet, consuming 3-5 servings of fruits/vegetables daily and limited fruits/vegetables  · Exercise: no formal exercise        Depression Screening  PHQ-2/9 Depression Screening    Little interest or pleasure in doing things: 0 - not at all  Feeling down, depressed, or hopeless: 0 - not at all  PHQ-2 Score: 0  PHQ-2 Interpretation: Negative depression screen       General Health  · Sleep: gets more than 8 hours of sleep on average  · Hearing: normal - bilateral   · Vision: goes for regular eye exams and wears glasses  · Dental: regular dental visits and brushes teeth three times daily  /GYN Health  · Patient is: perimenopausal  · Last menstrual period: 2023  · Contraceptive method: none  Review of Systems:     Review of Systems   Constitutional: Negative for chills, fatigue and fever  HENT: Negative for ear discharge, ear pain, postnasal drip, rhinorrhea and sore throat  Eyes: Negative for photophobia, pain and visual disturbance  Respiratory: Negative for cough and shortness of breath  Cardiovascular: Negative for chest pain and palpitations  Gastrointestinal: Negative for abdominal pain and vomiting  Genitourinary: Negative for dysuria and hematuria  Musculoskeletal: Positive for joint pain and joint swelling  Negative for arthralgias and back pain  Skin: Negative for color change and rash  Neurological: Negative for seizures, syncope and headaches  All other systems reviewed and are negative  Past Medical History:     Past Medical History:   Diagnosis Date   • Carpal tunnel syndrome of left wrist 2022   • Carpal tunnel syndrome on right 2021   • Depression    • Does not have health insurance 3/22/2021   • Hypothyroid    • No known health problems    • Thyromegaly 2022 thyroid ultrasound:  Homogeneous thyroid  No thyroid nodules   2021 TSH: Normal Home medication:  Levothyroxine 25 mcg daily   • Varicose veins of both lower extremities       Past Surgical History:     Past Surgical History:   Procedure Laterality Date   •  SECTION     • FL INJECTION RIGHT HIP (NON ARTHROGRAM)  02/10/2021   • HERNIA REPAIR     • OVARIAN CYST SURGERY      at 15 yo      Social History:     Social History     Socioeconomic History   • Marital status: Single     Spouse name: None   • Number of children: None   • Years of education: None   • Highest education level: None   Occupational History   • None   Tobacco Use   • Smoking status: Never     Passive exposure: Never   • Smokeless tobacco: Never   Vaping Use   • Vaping Use: Never used   Substance and Sexual Activity   • Alcohol use: No   • Drug use: No   • Sexual activity: Not Currently     Partners: Male   Other Topics Concern   • None   Social History Narrative    No preference on Jewish beliefs     Social Determinants of Health     Financial Resource Strain: Low Risk  (6/19/2023)    Overall Financial Resource Strain (CARDIA)    • Difficulty of Paying Living Expenses: Not very hard   Food Insecurity: No Food Insecurity (6/19/2023)    Hunger Vital Sign    • Worried About Running Out of Food in the Last Year: Never true    • Ran Out of Food in the Last Year: Never true   Transportation Needs: No Transportation Needs (6/19/2023)    PRAPARE - Transportation    • Lack of Transportation (Medical): No    • Lack of Transportation (Non-Medical): No   Physical Activity: Insufficiently Active (5/17/2022)    Exercise Vital Sign    • Days of Exercise per Week: 2 days    • Minutes of Exercise per Session: 20 min   Stress: No Stress Concern Present (5/17/2022)    [address]    • Feeling of Stress : Not at all   Social Connections: Socially Isolated (5/17/2022)    Social Connection and Isolation Panel [NHANES]    • Frequency of Communication with Friends and Family: Three times a week    • Frequency of Social Gatherings with Friends and Family:  Three times a week    • Attends [employer]ist Services: Never    • Active Member of Clubs or Organizations: No    • Attends Club or Organization Meetings: Never    • Marital Status: Never    Intimate Partner Violence: Not At Risk (5/17/2022)    Humiliation, Afraid, Rape, and Kick questionnaire    • Fear of Current or Ex-Partner: No    • Emotionally Abused: No    • Physically Abused: No    • Sexually Abused: No   Housing Stability: Unknown (5/17/2022)    Housing Stability Vital Sign    • Unable to Pay for Housing in the Last Year: No    • Number of Places Lived in the Last Year: Not on file    • Unstable Housing in the Last Year: No      Family History:     Family History   Problem Relation Age of Onset   • Diabetes Mother    • Heart failure Father    • No Known Problems Sister    • No Known Problems Daughter    • No Known Problems Maternal Grandfather    • No Known Problems Paternal Grandmother    • No Known Problems Paternal Grandfather    • No Known Problems Brother    • No Known Problems Brother    • No Known Problems Brother    • No Known Problems Brother    • No Known Problems Brother    • No Known Problems Son    • No Known Problems Maternal Aunt    • No Known Problems Maternal Aunt    • No Known Problems Maternal Aunt    • No Known Problems Paternal Aunt    • No Known Problems Paternal Aunt    • Diabetes Other    • Hyperlipidemia Other         pure      Current Medications:     Current Outpatient Medications   Medication Sig Dispense Refill   • cetirizine (ZyrTEC) 10 mg tablet Take 1 tablet (10 mg total) by mouth daily 60 tablet 0   • diclofenac sodium (VOLTAREN) 50 mg EC tablet Take 1 tablet (50 mg total) by mouth 2 (two) times a day 180 tablet 0   • levothyroxine (Synthroid) 25 mcg tablet Take 1 tablet (25 mcg total) by mouth daily in the early morning 60 tablet 0   • cholecalciferol (VITAMIN D3) 1,000 units tablet Take 1 tablet (1,000 Units total) by mouth daily 90 tablet 1   • lidocaine (LMX) 4 % cream Apply topically as needed for mild pain 30 g 0   • methocarbamol (Robaxin-750) 750 mg tablet Take 1 tablet (750 mg total) by mouth every 12 (twelve) hours as needed for muscle spasms 30 tablet 0     No current facility-administered medications for this visit        Allergies:     No "Known Allergies   Physical Exam:     /74 (BP Location: Left arm, Patient Position: Sitting, Cuff Size: Standard)   Pulse 78   Temp 97 8 °F (36 6 °C) (Temporal)   Resp 18   Ht 5' 2\" (1 575 m)   Wt 75 8 kg (167 lb)   LMP 03/15/2023 (Approximate)   SpO2 98%   BMI 30 54 kg/m²     Physical Exam  Vitals and nursing note reviewed  Constitutional:       General: She is not in acute distress  Appearance: She is well-developed  HENT:      Head: Normocephalic and atraumatic  Right Ear: External ear normal       Left Ear: External ear normal       Nose: Nose normal       Mouth/Throat:      Mouth: Mucous membranes are moist    Eyes:      Conjunctiva/sclera: Conjunctivae normal    Cardiovascular:      Rate and Rhythm: Normal rate and regular rhythm  Pulses: Normal pulses  Heart sounds: Normal heart sounds  No murmur heard  Pulmonary:      Effort: Pulmonary effort is normal  No respiratory distress  Breath sounds: Normal breath sounds  Abdominal:      General: Bowel sounds are normal       Palpations: Abdomen is soft  Tenderness: There is no abdominal tenderness  Musculoskeletal:         General: No swelling  Normal range of motion  Cervical back: Normal range of motion and neck supple  Skin:     General: Skin is warm and dry  Capillary Refill: Capillary refill takes less than 2 seconds  Neurological:      Mental Status: She is alert     Psychiatric:         Mood and Affect: Mood normal           Yessenia Hall, [address] [facility]  "

## 2023-06-19 NOTE — ASSESSMENT & PLAN NOTE
Patient reports seasonal non productive cough worse at night  No associated symptoms of fever, chills, nasal congestion  Cobblestoning present on pharynx consisted with allergic pharyngitis     Start Zyrtec 10 mg daily

## 2023-06-19 NOTE — ASSESSMENT & PLAN NOTE
Lab Results   Component Value Date    [id number] 3 456 06/12/2023     Continue with levothyroxine 25 mcg daily

## 2023-06-19 NOTE — ASSESSMENT & PLAN NOTE
Patient reports chronic bone/joint pain with intermittent swelling  ESR/CRP in 2022 - neg   Will check inflammatory markers r/o autoimmune disease     Start diclofenac 50 mg daily   Continue with robaxin 750 prn, lidocaine cream prn and acetaminophen prn

## 2023-07-26 NOTE — PROGRESS NOTES
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   Giving consideration for fibromyalgia  Previous RF, anti-ccp, FILOMENA, lyme panel, ESR, CRP all negative in 2021  ESR, CRP 10/24/22 negative  TSH last in 2022 was normal  Ibuprofen not helpful per patient; topical analgesics were "ok"    Previously prescribed duloxetine and robaxin, but patient states the pharmacy did not dispense     Discussed importance of exercise therapy: low impact exercise, aquatherapy  Physical therapy referral difficult due to no insurance coverage  Went over upper back exercises with patient and printed in AVS    Advise ice compresses; encouraged massages  Will send lidocaine cream and short course of robaxin for marked spasm  She would like to revisit possibly starting cymbalta at another visit  Follow up for annual physical in 3 months    Orders:  -     lidocaine (LMX) 4 % cream; Apply topically as needed for mild pain  -     methocarbamol (Robaxin-750) 750 mg tablet; Take 1 tablet (750 mg total) by mouth every 12 (twelve) hours as needed for muscle spasms    2  Subclinical hypothyroidism  Assessment & Plan:  Last TSH was in 2022, was normal  Recheck TSH, ordered  Continue on Synthroid 25mcg daily    Orders:  -     TSH, 3rd generation with Free T4 reflex; Future       Subjective     HPI     Patient Rosalia Vance  Rylee Hodgkins is a 52year old female with history of polyarthralgia/ polymyalgia of knees, hips and wrists who presents today for follow up regarding chronic RUE pain radiating from the neck  She was last seen for this in 10/2022  Today, the above has resolved   She has concern for chronic left sided chest soreness for the past 2 months which is throbbing in nature, occurs throughout the day and night, associated with left arm soreness worse when she lays down at night  She sleeps on alternating sides of the body with associated fatigue  The pain occurs with movement of the body  Denies any numbness, tingling, weakness of the extremities  She continues to works as a , working 5 hours/days for 5 days a week  Denies any heavy lifting or recent bodily accidents  Uses ibuprofen every now and then    Has been worked up in the past by rheumatology for other inflammatory conditions and orthopedics which have been benign  Has been treated with multiple pharmacological therapies: ibuprofen, naproxen, muscle relaxers, topical analgesics, medrol dose gil and steroid taper  Review of Systems   Constitutional: Negative for chills and fever  Musculoskeletal: Negative for joint swelling and neck stiffness  Skin: Negative for rash  Neurological: Negative for tremors, weakness, numbness and headaches  Denies fevers, chills, shortness of breath, palpitations, syncopal or presyncopal episodes, abdominal pain, nausea, vomiting    Labs reviewed: CBC, CMP, ESR, CRP    Imaging reviewed: Cervical Spine X-Ray: IMPRESSION:  Straightening with fused appearance of C5-C6  Foraminal narrowing on the left at C6-C7 and on the right at C4-C5  Past Medical History:   Diagnosis Date   • Carpal tunnel syndrome of left wrist 2022   • Carpal tunnel syndrome on right 2021   • Depression    • Does not have health insurance 3/22/2021   • Hypothyroid    • No known health problems    • Thyromegaly 2022 thyroid ultrasound:  Homogeneous thyroid  No thyroid nodules   2021 TSH: Normal Home medication:  Levothyroxine 25 mcg daily   • Varicose veins of both lower extremities      Past Surgical History:   Procedure Laterality Date   •  SECTION     • FL INJECTION RIGHT HIP (NON ARTHROGRAM)  02/10/2021   • HERNIA REPAIR     • OVARIAN CYST SURGERY      at 15 yo     Family History   Problem Relation Age of Onset   • Diabetes Mother    • Heart failure Father    • No Known Problems Sister    • No Known Problems Daughter    • No Known Problems Maternal Grandfather    • No Known Problems Paternal Grandmother    • No Known Problems Paternal Grandfather    • No Known Problems Brother    • No Known Problems Brother    • No Known Problems Brother    • No Known Problems Brother    • No Known Problems Brother    • No Known Problems Son    • No Known Problems Maternal Aunt    • No Known Problems Maternal Aunt    • No Known Problems Maternal Aunt    • No Known Problems Paternal Aunt    • No Known Problems Paternal Aunt    • Diabetes Other    • Hyperlipidemia Other         pure     Social History     Socioeconomic History   • Marital status: Single     Spouse name: None   • Number of children: None   • Years of education: None   • Highest education level: None   Occupational History   • None   Tobacco Use   • Smoking status: Never   • Smokeless tobacco: Never   Vaping Use   • Vaping Use: Never used   Substance and Sexual Activity   • Alcohol use: No   • Drug use: No   • Sexual activity: Not Currently     Partners: Male   Other Topics Concern   • None   Social History Narrative    No preference on Caodaism beliefs     Social Determinants of Health     Financial Resource Strain: Low Risk    • Difficulty of Paying Living Expenses: Not hard at all   Food Insecurity: No Food Insecurity   • Worried About Running Out of Food in the Last Year: Never true   • Ran Out of Food in the Last Year: Never true   Transportation Needs: No Transportation Needs   • Lack of Transportation (Medical): No   • Lack of Transportation (Non-Medical):  No   Physical Activity: Insufficiently Active   • Days of Exercise per Week: 2 days   • Minutes of Exercise per Session: 20 min   Stress: No Stress Concern Present   • Feeling of Stress : Not at all   Social Connections: Socially Isolated   • Frequency of Communication with Friends and Family: Three times a week   • Frequency of Social Gatherings with Friends and Family:  Three times a week   • Attends Yazidism Services: Never   • Active Member of Clubs or Organizations: No   • Attends Club or Organization Meetings: Never   • Marital Status: Never    Intimate Partner Violence: Not At Risk   • Fear of Current or Ex-Partner: No   • Emotionally Abused: No   • Physically Abused: No   • Sexually Abused: No   Housing Stability: Unknown   • Unable to Pay for Housing in the Last Year: No   • Number of Places Lived in the Last Year: Not on file   • Unstable Housing in the Last Year: No     Current Outpatient Medications on File Prior to Visit   Medication Sig   • cholecalciferol (VITAMIN D3) 1,000 units tablet Take 1 tablet (1,000 Units total) by mouth daily   • levothyroxine (Synthroid) 25 mcg tablet Take 1 tablet (25 mcg total) by mouth daily in the early morning   • acetaminophen (TYLENOL) 500 mg tablet Take 2 tablets (1,000 mg total) by mouth every 6 (six) hours as needed for moderate pain (Patient not taking: Reported on 12/14/2022)   • DULoxetine (Cymbalta) 30 mg delayed release capsule Take 1 capsule (30 mg total) by mouth daily (Patient not taking: Reported on 12/14/2022)   • ibuprofen (MOTRIN) 600 mg tablet Take 1 tablet (600 mg total) by mouth every 6 (six) hours as needed for mild pain for up to 14 days   • pantoprazole (PROTONIX) 20 mg tablet Take 1 tablet (20 mg total) by mouth daily   • [DISCONTINUED] IBUPROFEN PO Take by mouth     No Known Allergies  Immunization History   Administered Date(s) Administered   • Influenza, recombinant, quadrivalent,injectable, preservative free 11/21/2022   • Tdap 11/21/2022       Objective     /80 (BP Location: Right arm, Patient Position: Sitting, Cuff Size: Standard)   Pulse 86   Temp 97 7 °F (36 5 °C) (Temporal)   Resp 16   Ht 5' 2" (1 575 m)   Wt 77 1 kg (170 lb)   LMP 02/22/2023 (Approximate)   SpO2 97%   Breastfeeding No   BMI 31 09 kg/m²     Physical Exam     Tenderness and marked spasm of posterior neck, along the trapezius and upper back, upper chest, mostly to the left and upper left arm, reproducible with palpation  Tinel test and phalen test negative  Motor strength 5/5 in b/l upper extremities  Sensation is intact in b/l upper extremities  ROM in neck and shoulders intact    Corinna Art MD None

## 2023-08-10 ENCOUNTER — APPOINTMENT (OUTPATIENT)
Dept: LAB | Facility: CLINIC | Age: 48
End: 2023-08-10
Payer: COMMERCIAL

## 2023-08-10 DIAGNOSIS — M79.10 MYALGIA: ICD-10-CM

## 2023-08-10 LAB
BASOPHILS # BLD AUTO: 0.06 THOUSANDS/ÂΜL (ref 0–0.1)
BASOPHILS NFR BLD AUTO: 1 % (ref 0–1)
CRP SERPL QL: <3 MG/L
EOSINOPHIL # BLD AUTO: 0.07 THOUSAND/ÂΜL (ref 0–0.61)
EOSINOPHIL NFR BLD AUTO: 1 % (ref 0–6)
ERYTHROCYTE [DISTWIDTH] IN BLOOD BY AUTOMATED COUNT: 12 % (ref 11.6–15.1)
FERRITIN SERPL-MCNC: 24 NG/ML (ref 11–307)
HCT VFR BLD AUTO: 40.9 % (ref 34.8–46.1)
HGB BLD-MCNC: 13.5 G/DL (ref 11.5–15.4)
IMM GRANULOCYTES # BLD AUTO: 0.01 THOUSAND/UL (ref 0–0.2)
IMM GRANULOCYTES NFR BLD AUTO: 0 % (ref 0–2)
IRON SATN MFR SERPL: 31 % (ref 15–50)
IRON SERPL-MCNC: 97 UG/DL (ref 50–170)
LYMPHOCYTES # BLD AUTO: 2.11 THOUSANDS/ÂΜL (ref 0.6–4.47)
LYMPHOCYTES NFR BLD AUTO: 36 % (ref 14–44)
MCH RBC QN AUTO: 32.5 PG (ref 26.8–34.3)
MCHC RBC AUTO-ENTMCNC: 33 G/DL (ref 31.4–37.4)
MCV RBC AUTO: 99 FL (ref 82–98)
MONOCYTES # BLD AUTO: 0.29 THOUSAND/ÂΜL (ref 0.17–1.22)
MONOCYTES NFR BLD AUTO: 5 % (ref 4–12)
NEUTROPHILS # BLD AUTO: 3.3 THOUSANDS/ÂΜL (ref 1.85–7.62)
NEUTS SEG NFR BLD AUTO: 57 % (ref 43–75)
NRBC BLD AUTO-RTO: 0 /100 WBCS
PLATELET # BLD AUTO: 335 THOUSANDS/UL (ref 149–390)
PMV BLD AUTO: 10.7 FL (ref 8.9–12.7)
RBC # BLD AUTO: 4.15 MILLION/UL (ref 3.81–5.12)
TIBC SERPL-MCNC: 317 UG/DL (ref 250–450)
WBC # BLD AUTO: 5.84 THOUSAND/UL (ref 4.31–10.16)

## 2023-08-10 PROCEDURE — 86430 RHEUMATOID FACTOR TEST QUAL: CPT

## 2023-08-10 PROCEDURE — 82728 ASSAY OF FERRITIN: CPT

## 2023-08-10 PROCEDURE — 83540 ASSAY OF IRON: CPT

## 2023-08-10 PROCEDURE — 36415 COLL VENOUS BLD VENIPUNCTURE: CPT

## 2023-08-10 PROCEDURE — 85025 COMPLETE CBC W/AUTO DIFF WBC: CPT

## 2023-08-10 PROCEDURE — 86200 CCP ANTIBODY: CPT

## 2023-08-10 PROCEDURE — 83550 IRON BINDING TEST: CPT

## 2023-08-10 PROCEDURE — 86140 C-REACTIVE PROTEIN: CPT

## 2023-08-10 PROCEDURE — 86038 ANTINUCLEAR ANTIBODIES: CPT

## 2023-08-11 LAB
ANA SER QL IA: NEGATIVE
CCP AB SER IA-ACNC: 0.9
RHEUMATOID FACT SER QL LA: NEGATIVE

## 2023-10-26 ENCOUNTER — OFFICE VISIT (OUTPATIENT)
Dept: DENTISTRY | Facility: CLINIC | Age: 48
End: 2023-10-26

## 2023-10-26 VITALS — HEART RATE: 67 BPM | TEMPERATURE: 97.7 F | DIASTOLIC BLOOD PRESSURE: 77 MMHG | SYSTOLIC BLOOD PRESSURE: 118 MMHG

## 2023-10-26 DIAGNOSIS — Z01.21 ENCOUNTER FOR DENTAL EXAMINATION AND CLEANING WITH ABNORMAL FINDINGS: Primary | ICD-10-CM

## 2023-10-26 PROCEDURE — D1110 PROPHYLAXIS - ADULT: HCPCS | Performed by: DENTAL HYGIENIST

## 2023-10-26 PROCEDURE — D0274 BITEWINGS - 4 RADIOGRAPHIC IMAGES: HCPCS | Performed by: DENTAL HYGIENIST

## 2023-10-26 PROCEDURE — D0150 COMPREHENSIVE ORAL EVALUATION - NEW OR ESTABLISHED PATIENT: HCPCS

## 2023-10-26 NOTE — DENTAL PROCEDURE DETAILS
Laila Valadez presents for a Comprehensive exam. Verbal consent for treatment given in addition to the forms. Reviewed health history - Patient is ASA II  Consents signed: Yes  I-PAD Divehi translation -  #  [id number] - 15 min     Perio: Moderate bleeding and Gingivitis  ---FMP - 1-4 mm  ---Stage 1, Grade A  Pain Scale: 0  Caries Assessment: Medium  Radiographs: Bitewings x4  EO/IO/OCS:  WNL     Oral Hygiene instruction reviewed and given. OHI:  Poor  ---Lt to mod sub and supra calc  ---Cavitron, handscaled, polish, floss  Recommended Hygiene recall visits with  Abiel Wren. Treatment Plan:  1.  6mrc   2. Caries control: Watch areas as charted  ---Margin leakage on distal of crown #19 - recommend new crown   3. Occlusal evaluation:   Class I    Prognosis is Good.   Referrals needed: No  Exam:  Dr. Smalls Quiet    NV1:  Quote price for crown #19 - if OK then [place] prep - 60 min  NV2:  6mrc - 50 min

## 2023-12-19 PROBLEM — Z12.11 SCREENING FOR COLON CANCER: Status: ACTIVE | Noted: 2023-12-19

## 2024-01-28 PROBLEM — Z00.00 ANNUAL PHYSICAL EXAM: Status: ACTIVE | Noted: 2024-01-28

## 2024-02-07 ENCOUNTER — ANNUAL EXAM (OUTPATIENT)
Dept: OBGYN CLINIC | Facility: CLINIC | Age: 49
End: 2024-02-07

## 2024-02-07 VITALS
HEART RATE: 71 BPM | SYSTOLIC BLOOD PRESSURE: 108 MMHG | HEIGHT: 62 IN | DIASTOLIC BLOOD PRESSURE: 73 MMHG | BODY MASS INDEX: 31.98 KG/M2 | WEIGHT: 173.8 LBS

## 2024-02-07 DIAGNOSIS — Z12.4 CERVICAL CANCER SCREENING: ICD-10-CM

## 2024-02-07 DIAGNOSIS — Z12.31 ENCOUNTER FOR SCREENING MAMMOGRAM FOR MALIGNANT NEOPLASM OF BREAST: ICD-10-CM

## 2024-02-07 DIAGNOSIS — Z01.419 WOMEN'S ANNUAL ROUTINE GYNECOLOGICAL EXAMINATION: Primary | ICD-10-CM

## 2024-02-07 PROCEDURE — 99396 PREV VISIT EST AGE 40-64: CPT | Performed by: NURSE PRACTITIONER

## 2024-02-07 NOTE — PROGRESS NOTES
ANNUAL GYNECOLOGICAL EXAMINATION    Mary Oviedo is a 48 y.o. female who presents today for annual GYN exam.  Her last pap smear was performed 3/2021 and result was NILM, HR-HPV negative.  She reports no history of abnormal pap smears in her past.  Her last mammogram was performed 2022 and result was BIRADS 1.  She had colonoscopy performed in  with recommendation to repeat in 10 years.  She had HIV screening performed in  and it was negative.  She reports menses as irregular over the past year, menses have been about every 2-3 months.  Patient's last menstrual period was 2023 (approximate).  Her general medical history has been reviewed and she reports it as follows:    Past Medical History:   Diagnosis Date    Carpal tunnel syndrome of left wrist 2022    Carpal tunnel syndrome on right 2021    Depression     Does not have health insurance 3/22/2021    Hypothyroid     No known health problems     Thyromegaly 2022 thyroid ultrasound:  Homogeneous thyroid.  No thyroid nodules. 2021 TSH: Normal Home medication:  Levothyroxine 25 mcg daily    Varicose veins of both lower extremities      Past Surgical History:   Procedure Laterality Date     SECTION      FL INJECTION RIGHT HIP (NON ARTHROGRAM)  02/10/2021    HERNIA REPAIR      OVARIAN CYST SURGERY      at 12 yo     OB History          2    Para   2    Term   2            AB        Living   2         SAB        IAB        Ectopic        Multiple        Live Births                   Social History     Tobacco Use    Smoking status: Never     Passive exposure: Never    Smokeless tobacco: Never   Vaping Use    Vaping status: Never Used   Substance Use Topics    Alcohol use: No    Drug use: No     Social History     Substance and Sexual Activity   Sexual Activity Not Currently    Partners: Male     Cancer-related family history is negative for Breast cancer, Colon cancer, and Ovarian  "cancer.    Current Outpatient Medications   Medication Instructions    cetirizine (ZYRTEC) 10 mg, Oral, Daily    cholecalciferol (VITAMIN D3) 1,000 Units, Oral, Daily    diclofenac sodium (VOLTAREN) 50 mg, Oral, 2 times daily    levothyroxine (SYNTHROID) 25 mcg, Oral, Daily (early morning)    lidocaine (LMX) 4 % cream Topical, As needed    methocarbamol (ROBAXIN-750) 750 mg, Oral, Every 12 hours PRN       Review of Systems:  Review of Systems   Constitutional: Negative.    Gastrointestinal: Negative.    Genitourinary: Negative.    Skin: Negative.        Physical Exam:  /73 (BP Location: Right arm)   Pulse 71   Ht 5' 2\" (1.575 m)   Wt 78.8 kg (173 lb 12.8 oz)   LMP 12/05/2023 (Approximate)   BMI 31.79 kg/m²   Physical Exam  Constitutional:       General: She is not in acute distress.     Appearance: Normal appearance.   Genitourinary:      Vulva and bladder normal.      No lesions in the vagina.      No vaginal erythema or ulceration.        Right Adnexa: not tender and no mass present.     Left Adnexa: not tender and no mass present.     No cervical motion tenderness or lesion.      Uterus is not enlarged or tender.      No uterine mass detected.  Breasts:     Right: No mass, nipple discharge or skin change.      Left: No mass, nipple discharge or skin change.   Cardiovascular:      Rate and Rhythm: Normal rate and regular rhythm.   Pulmonary:      Effort: Pulmonary effort is normal.      Breath sounds: Normal breath sounds.   Abdominal:      General: Abdomen is flat.      Palpations: Abdomen is soft.   Musculoskeletal:      Cervical back: Neck supple.   Neurological:      Mental Status: She is alert.   Skin:     General: Skin is warm and dry.   Psychiatric:         Mood and Affect: Mood normal.         Behavior: Behavior normal.   Vitals reviewed.       Assessment/Plan:   1. Normal well-woman GYN exam.  2. Cervical cancer screening:  Normal cervical exam.  Pap smear up to date.      3. STD screening:  " Patient declines.   4. Breast cancer screening:  Normal breast exam.  Order placed for bilateral screening mammogram.  Reviewed breast self-awareness.   5. Colon cancer screening:  Up to date.   6. Depression Screening: Patient's depression screening was assessed with a PHQ-2 score of 0. Their PHQ-9 score was 0. Clinically patient does not have depression. No treatment is required.     7. BMI Counseling: Body mass index is 31.79 kg/m². Discussed the patient's BMI with her. The BMI is above normal. Nutrition recommendations include decreasing overall calorie intake, 3-5 servings of fruits/vegetables daily, increasing intake of lean protein, and reducing intake of saturated fat and trans fat. Exercise recommendations include moderate aerobic physical activity for 150 minutes/week and exercising 3-5 times per week.   8. Contraception:  Abstinence    9. Discussed expectations for perimenopausal period. Discussed average age of menopause is 51, menses leading up to this are often irregular. Encouraged to track periods. Once 1 year of amenorrhea has occurred will consider this official menopause.    10. Return to office in one year for annual exam or sooner if needed.    Reviewed with patient that [facility], but that they will not necessarily be reviewed by me immediately.  Explained that I will review results at my earliest opportunity and contact patient appropriately.

## 2024-02-09 ENCOUNTER — TELEPHONE (OUTPATIENT)
Dept: OTHER | Facility: OTHER | Age: 49
End: 2024-02-09

## 2024-02-09 NOTE — TELEPHONE ENCOUNTER
Mary Salcedoduong Oviedo  [unfilled]  [unfilled]  [phone]  [email or web address]  Luxembourger  Luxembourger  Aly Sherman MD     Mammogram Screening (Hospital locations only): [facility]  Pap smear screening (Clinic vs [facility]):  [facility] ([facility]       Transportation:     Education: Patient scheduled for her mammogram and educated on routine screenings and the importance of early detection.

## 2024-02-17 PROBLEM — Z12.11 SCREENING FOR COLON CANCER: Status: RESOLVED | Noted: 2023-12-19 | Resolved: 2024-02-17

## 2024-03-08 ENCOUNTER — OFFICE VISIT (OUTPATIENT)
Dept: FAMILY MEDICINE CLINIC | Facility: CLINIC | Age: 49
End: 2024-03-08

## 2024-03-08 VITALS
OXYGEN SATURATION: 97 % | WEIGHT: 178.8 LBS | SYSTOLIC BLOOD PRESSURE: 108 MMHG | TEMPERATURE: 98.5 F | HEART RATE: 79 BPM | DIASTOLIC BLOOD PRESSURE: 68 MMHG | HEIGHT: 62 IN | BODY MASS INDEX: 32.9 KG/M2

## 2024-03-08 DIAGNOSIS — E66.09 CLASS 1 OBESITY DUE TO EXCESS CALORIES WITHOUT SERIOUS COMORBIDITY WITH BODY MASS INDEX (BMI) OF 32.0 TO 32.9 IN ADULT: Primary | ICD-10-CM

## 2024-03-08 DIAGNOSIS — E03.8 OTHER SPECIFIED HYPOTHYROIDISM: ICD-10-CM

## 2024-03-08 PROBLEM — E66.811 CLASS 1 OBESITY DUE TO EXCESS CALORIES WITHOUT SERIOUS COMORBIDITY WITH BODY MASS INDEX (BMI) OF 32.0 TO 32.9 IN ADULT: Status: ACTIVE | Noted: 2024-03-08

## 2024-03-08 PROBLEM — E03.9 HYPOTHYROIDISM: Status: ACTIVE | Noted: 2022-04-11

## 2024-03-08 PROCEDURE — 99213 OFFICE O/P EST LOW 20 MIN: CPT | Performed by: FAMILY MEDICINE

## 2024-03-08 NOTE — ASSESSMENT & PLAN NOTE
04/25/2022 thyroid ultrasound:  Homogeneous thyroid.  No thyroid nodules.  O6/2023 TSH: Normal  Currently on Levothyroxine 25 mcg qd     Plan:  Continue current regimen  TSH

## 2024-03-08 NOTE — PROGRESS NOTES
Name: Mary Oviedo      : 1975      MRN: [id number]  Encounter Provider: Aly Sherman MD  Encounter Date: 3/8/2024   Encounter department: [facility]    Assessment & Plan     1. Class 1 obesity due to excess calories without serious comorbidity with body mass index (BMI) of 32.0 to 32.9 in adult  Assessment & Plan:  Education provided   Patient motivated to pursue lifestyle modifications     Orders:  -     Lipid panel; Future  -     CBC and differential; Future  -     Comprehensive metabolic panel; Future    2. Other specified hypothyroidism  Assessment & Plan:  2022 thyroid ultrasound:  Homogeneous thyroid.  No thyroid nodules.   TSH: Normal  Currently on Levothyroxine 25 mcg qd     Plan:  Continue current regimen  TSH     Orders:  -     TSH w/Reflex; Future           Subjective      This is a 48 y.o female with PMH of subclinical hypothyroidism, vitamin D deficiency, chronic pain and seasonal allergies who presents today for follow up of hypothyroidism. Patient reports to feel well, but would like to get some lab work done to follow up on her cholesterol and blood sugars.  Last TSH 3.45 on 2023  Patient denies cold intolerance, fatigue or polyphagia.       Review of Systems   Constitutional:  Negative for fatigue.   Endocrine: Negative for cold intolerance, polydipsia and polyphagia.       Current Outpatient Medications on File Prior to Visit   Medication Sig    cetirizine (ZyrTEC) 10 mg tablet Take 1 tablet (10 mg total) by mouth daily    cholecalciferol (VITAMIN D3) 1,000 units tablet Take 1 tablet (1,000 Units total) by mouth daily (Patient not taking: Reported on 2024)    diclofenac sodium (VOLTAREN) 50 mg EC tablet Take 1 tablet (50 mg total) by mouth 2 (two) times a day    levothyroxine (Synthroid) 25 mcg tablet Take 1 tablet (25 mcg total) by mouth daily in the early morning    lidocaine (LMX) 4 % cream Apply topically as  "needed for mild pain (Patient not taking: Reported on 2/7/2024)    methocarbamol (Robaxin-750) 750 mg tablet Take 1 tablet (750 mg total) by mouth every 12 (twelve) hours as needed for muscle spasms (Patient not taking: Reported on 2/7/2024)       Objective     /68 (BP Location: Left arm, Patient Position: Sitting, Cuff Size: Standard)   Pulse 79   Temp 98.5 °F (36.9 °C) (Temporal)   Ht 5' 2\" (1.575 m)   Wt 81.1 kg (178 lb 12.8 oz)   LMP 12/05/2023 (Approximate) Comment: Pt states this was her last period  SpO2 97%   BMI 32.70 kg/m²     Physical Exam  Constitutional:       General: She is not in acute distress.     Appearance: Normal appearance. She is not toxic-appearing.   HENT:      Head: Normocephalic and atraumatic.      Nose: Nose normal. No congestion or rhinorrhea.   Eyes:      General: No scleral icterus.     Conjunctiva/sclera: Conjunctivae normal.   Neck:      Thyroid: No thyroid mass, thyromegaly or thyroid tenderness.   Cardiovascular:      Rate and Rhythm: Normal rate and regular rhythm.      Pulses: Normal pulses.      Heart sounds: Normal heart sounds. No murmur heard.     No gallop.   Pulmonary:      Effort: Pulmonary effort is normal. No respiratory distress.      Breath sounds: Normal breath sounds. No wheezing or rales.   Musculoskeletal:      Cervical back: Normal range of motion and neck supple.      Right lower leg: No edema.      Left lower leg: No edema.   Skin:     General: Skin is warm and dry.      Capillary Refill: Capillary refill takes less than 2 seconds.      Coloration: Skin is not jaundiced or pale.   Neurological:      General: No focal deficit present.      Mental Status: She is alert and oriented to person, place, and time.   Psychiatric:         Mood and Affect: Mood normal.         Behavior: Behavior normal.       Aly Sherman MD    "

## 2024-03-12 ENCOUNTER — APPOINTMENT (OUTPATIENT)
Dept: LAB | Facility: HOSPITAL | Age: 49
End: 2024-03-12
Payer: COMMERCIAL

## 2024-03-12 DIAGNOSIS — E03.8 OTHER SPECIFIED HYPOTHYROIDISM: ICD-10-CM

## 2024-03-12 DIAGNOSIS — E66.09 CLASS 1 OBESITY DUE TO EXCESS CALORIES WITHOUT SERIOUS COMORBIDITY WITH BODY MASS INDEX (BMI) OF 32.0 TO 32.9 IN ADULT: ICD-10-CM

## 2024-03-12 LAB
ALBUMIN SERPL BCP-MCNC: 4 G/DL (ref 3.5–5)
ALP SERPL-CCNC: 99 U/L (ref 34–104)
ALT SERPL W P-5'-P-CCNC: 13 U/L (ref 7–52)
ANION GAP SERPL CALCULATED.3IONS-SCNC: 5 MMOL/L (ref 4–13)
AST SERPL W P-5'-P-CCNC: 13 U/L (ref 13–39)
BASOPHILS # BLD AUTO: 0.07 THOUSANDS/ÂΜL (ref 0–0.1)
BASOPHILS NFR BLD AUTO: 1 % (ref 0–1)
BILIRUB SERPL-MCNC: 0.46 MG/DL (ref 0.2–1)
BUN SERPL-MCNC: 14 MG/DL (ref 5–25)
CALCIUM SERPL-MCNC: 9.1 MG/DL (ref 8.4–10.2)
CHLORIDE SERPL-SCNC: 106 MMOL/L (ref 96–108)
CHOLEST SERPL-MCNC: 216 MG/DL
CO2 SERPL-SCNC: 27 MMOL/L (ref 21–32)
CREAT SERPL-MCNC: 0.59 MG/DL (ref 0.6–1.3)
EOSINOPHIL # BLD AUTO: 0.07 THOUSAND/ÂΜL (ref 0–0.61)
EOSINOPHIL NFR BLD AUTO: 1 % (ref 0–6)
ERYTHROCYTE [DISTWIDTH] IN BLOOD BY AUTOMATED COUNT: 12.4 % (ref 11.6–15.1)
GFR SERPL CREATININE-BSD FRML MDRD: 108 ML/MIN/1.73SQ M
GLUCOSE P FAST SERPL-MCNC: 108 MG/DL (ref 65–99)
HCT VFR BLD AUTO: 38.7 % (ref 34.8–46.1)
HDLC SERPL-MCNC: 80 MG/DL
HGB BLD-MCNC: 12.7 G/DL (ref 11.5–15.4)
IMM GRANULOCYTES # BLD AUTO: 0.02 THOUSAND/UL (ref 0–0.2)
IMM GRANULOCYTES NFR BLD AUTO: 0 % (ref 0–2)
LDLC SERPL CALC-MCNC: 122 MG/DL (ref 0–100)
LYMPHOCYTES # BLD AUTO: 2.26 THOUSANDS/ÂΜL (ref 0.6–4.47)
LYMPHOCYTES NFR BLD AUTO: 37 % (ref 14–44)
MCH RBC QN AUTO: 32.4 PG (ref 26.8–34.3)
MCHC RBC AUTO-ENTMCNC: 32.8 G/DL (ref 31.4–37.4)
MCV RBC AUTO: 99 FL (ref 82–98)
MONOCYTES # BLD AUTO: 0.35 THOUSAND/ÂΜL (ref 0.17–1.22)
MONOCYTES NFR BLD AUTO: 6 % (ref 4–12)
NEUTROPHILS # BLD AUTO: 3.42 THOUSANDS/ÂΜL (ref 1.85–7.62)
NEUTS SEG NFR BLD AUTO: 55 % (ref 43–75)
NONHDLC SERPL-MCNC: 136 MG/DL
NRBC BLD AUTO-RTO: 0 /100 WBCS
PLATELET # BLD AUTO: 345 THOUSANDS/UL (ref 149–390)
PMV BLD AUTO: 9.3 FL (ref 8.9–12.7)
POTASSIUM SERPL-SCNC: 3.9 MMOL/L (ref 3.5–5.3)
PROT SERPL-MCNC: 6.7 G/DL (ref 6.4–8.4)
RBC # BLD AUTO: 3.92 MILLION/UL (ref 3.81–5.12)
SODIUM SERPL-SCNC: 138 MMOL/L (ref 135–147)
TRIGL SERPL-MCNC: 70 MG/DL
TSH SERPL DL<=0.05 MIU/L-ACNC: 3.39 UIU/ML (ref 0.45–4.5)
WBC # BLD AUTO: 6.19 THOUSAND/UL (ref 4.31–10.16)

## 2024-03-12 PROCEDURE — 85025 COMPLETE CBC W/AUTO DIFF WBC: CPT

## 2024-03-12 PROCEDURE — 80061 LIPID PANEL: CPT

## 2024-03-12 PROCEDURE — 36415 COLL VENOUS BLD VENIPUNCTURE: CPT

## 2024-03-12 PROCEDURE — 80053 COMPREHEN METABOLIC PANEL: CPT

## 2024-03-12 PROCEDURE — 84443 ASSAY THYROID STIM HORMONE: CPT

## 2024-03-21 NOTE — PROGRESS NOTES
Name: Mary Oviedo      : 1975      MRN: [id number]  Encounter Provider: Aly Sherman MD  Encounter Date: 3/22/2024   Encounter department: [facility]    Assessment & Plan     1. Class 1 obesity due to excess calories without serious comorbidity with body mass index (BMI) of 32.0 to 32.9 in adult  Assessment & Plan:  Patient recently started going to the gym   Education about lifestyle modification was given   Nutrition services referral   Patient is motivated to continue lifestyle modifications       Orders:  -     Ambulatory Referral to Nutrition Services; Future    2. Other specified hypothyroidism  Assessment & Plan:  Well controled   TSH on 3/12/24 wnl   Currently on Synthroid 25 mg qd     Plan:  Continue current regimen     Orders:  -     levothyroxine (Synthroid) 25 mcg tablet; Take 1 tablet (25 mcg total) by mouth daily in the early morning           Subjective      Mary is a 48 y.o female with PMH of hypothyroidism, chronic pain, hyperlipidemia and seasonal allergies who presents today to discuss labs.   Labs were reviewed with patient and emphasis on lifestyle modifications to control hyperlipidemia and prediabetes was given. Patient reports that she recently joined a gym and is motivated. She has a son who is also eating better and working out who has been helping her.   All questions were answered to patient's satisfaction. Patient is interested on nutritional services.       Review of Systems   Eyes:  Negative for visual disturbance.   Cardiovascular:  Negative for chest pain and palpitations.   Gastrointestinal:  Negative for constipation and diarrhea.   Endocrine: Negative for cold intolerance, polydipsia, polyphagia and polyuria.       Current Outpatient Medications on File Prior to Visit   Medication Sig    cetirizine (ZyrTEC) 10 mg tablet Take 1 tablet (10 mg total) by mouth daily    diclofenac sodium (VOLTAREN) 50 mg EC tablet Take 1  "tablet (50 mg total) by mouth 2 (two) times a day    [DISCONTINUED] cholecalciferol (VITAMIN D3) 1,000 units tablet Take 1 tablet (1,000 Units total) by mouth daily (Patient not taking: Reported on 2/7/2024)    [DISCONTINUED] levothyroxine (Synthroid) 25 mcg tablet Take 1 tablet (25 mcg total) by mouth daily in the early morning    [DISCONTINUED] lidocaine (LMX) 4 % cream Apply topically as needed for mild pain (Patient not taking: Reported on 2/7/2024)    [DISCONTINUED] methocarbamol (Robaxin-750) 750 mg tablet Take 1 tablet (750 mg total) by mouth every 12 (twelve) hours as needed for muscle spasms (Patient not taking: Reported on 2/7/2024)       Objective     /82 (BP Location: Left arm, Patient Position: Sitting, Cuff Size: Standard)   Pulse 80   Temp 97.5 °F (36.4 °C) (Temporal)   Resp 18   Ht 5' 2\" (1.575 m)   Wt 82.1 kg (181 lb)   LMP 12/05/2023 (Approximate) Comment: Pt states this was her last period  SpO2 99%   BMI 33.11 kg/m²     Physical Exam  Constitutional:       General: She is not in acute distress.     Appearance: She is obese.   HENT:      Right Ear: External ear normal.      Left Ear: External ear normal.      Nose: Nose normal.   Eyes:      General: No scleral icterus.  Pulmonary:      Effort: No respiratory distress.   Musculoskeletal:      Right lower leg: No edema.      Left lower leg: No edema.   Skin:     General: Skin is warm and dry.   Neurological:      General: No focal deficit present.      Mental Status: She is alert and oriented to person, place, and time.   Psychiatric:         Mood and Affect: Mood normal.         Behavior: Behavior normal.       Aly Sherman MD    "

## 2024-03-22 ENCOUNTER — OFFICE VISIT (OUTPATIENT)
Dept: FAMILY MEDICINE CLINIC | Facility: CLINIC | Age: 49
End: 2024-03-22

## 2024-03-22 VITALS
DIASTOLIC BLOOD PRESSURE: 82 MMHG | RESPIRATION RATE: 18 BRPM | OXYGEN SATURATION: 99 % | HEART RATE: 80 BPM | BODY MASS INDEX: 33.31 KG/M2 | HEIGHT: 62 IN | SYSTOLIC BLOOD PRESSURE: 126 MMHG | TEMPERATURE: 97.5 F | WEIGHT: 181 LBS

## 2024-03-22 DIAGNOSIS — E66.09 CLASS 1 OBESITY DUE TO EXCESS CALORIES WITHOUT SERIOUS COMORBIDITY WITH BODY MASS INDEX (BMI) OF 32.0 TO 32.9 IN ADULT: Primary | ICD-10-CM

## 2024-03-22 DIAGNOSIS — E03.8 OTHER SPECIFIED HYPOTHYROIDISM: ICD-10-CM

## 2024-03-22 PROBLEM — Z00.00 ANNUAL PHYSICAL EXAM: Status: RESOLVED | Noted: 2024-01-28 | Resolved: 2024-03-22

## 2024-03-22 PROCEDURE — 99213 OFFICE O/P EST LOW 20 MIN: CPT | Performed by: FAMILY MEDICINE

## 2024-03-22 RX ORDER — LEVOTHYROXINE SODIUM 0.03 MG/1
25 TABLET ORAL
Qty: 90 TABLET | Refills: 0 | Status: SHIPPED | OUTPATIENT
Start: 2024-03-22 | End: 2024-06-20

## 2024-03-22 NOTE — ASSESSMENT & PLAN NOTE
Well controled   TSH on 3/12/24 wnl   Currently on Synthroid 25 mg qd     Plan:  Continue current regimen

## 2024-03-22 NOTE — ASSESSMENT & PLAN NOTE
Patient recently started going to the gym   Education about lifestyle modification was given   Nutrition services referral   Patient is motivated to continue lifestyle modifications

## 2024-05-29 ENCOUNTER — HOSPITAL ENCOUNTER (OUTPATIENT)
Dept: MAMMOGRAPHY | Facility: CLINIC | Age: 49
Discharge: HOME | End: 2024-05-29

## 2024-05-29 VITALS — HEIGHT: 62 IN | WEIGHT: 181 LBS | BODY MASS INDEX: 33.31 KG/M2

## 2024-05-29 DIAGNOSIS — Z12.31 ENCOUNTER FOR SCREENING MAMMOGRAM FOR MALIGNANT NEOPLASM OF BREAST: ICD-10-CM

## 2024-05-29 PROCEDURE — 77067 SCR MAMMO BI INCL CAD: CPT

## 2024-05-29 PROCEDURE — 77063 BREAST TOMOSYNTHESIS BI: CPT

## 2024-07-01 DIAGNOSIS — E03.8 OTHER SPECIFIED HYPOTHYROIDISM: ICD-10-CM

## 2024-07-01 RX ORDER — LEVOTHYROXINE SODIUM 0.03 MG/1
25 TABLET ORAL
Qty: 90 TABLET | Refills: 0 | Status: SHIPPED | OUTPATIENT
Start: 2024-07-01 | End: 2024-09-29

## 2024-07-01 NOTE — TELEPHONE ENCOUNTER
Pt left voicemail requesting refill for   levothyroxine (Synthroid) 25 mcg tablet  I already sent the medication to the pharmacy.

## 2024-10-15 NOTE — PROGRESS NOTES
Ambulatory Visit  Name: Mary Oviedo      : 1975      MRN: [id number]  Encounter Provider: Aly Sherman MD  Encounter Date: 10/16/2024   Encounter department: [facility]    Assessment & Plan  Other specified hypothyroidism  Stable, asymptomatic  Home medication: Levothyroxine 25 mcg daily  Last TSH: 3/12/2024 within normal limits; TSH within range since     Plan:  Continue current regimen  Patient is requesting to repeat TSH   Orders:    TSH, 3rd generation; Future    levothyroxine (Synthroid) 25 mcg tablet; Take 1 tablet (25 mcg total) by mouth daily in the early morning    Class 1 obesity due to excess calories without serious comorbidity with body mass index (BMI) of 32.0 to 32.9 in adult    Diet is well balanced, but portions are not well controlled   No regular exercise    Plan:  Education provided on calorie deficit diet and regular exercise as well as nutritional facts   Lab work ordered per patient request   Follow up in 1 month   Food diary to be brought on next visit     Orders:    CBC and differential; Future    Lipid panel; Future    Hemoglobin A1C; Future       History of Present Illness     Mary is a 49 y.o female with PMH of obesity, hypothyroidism and chronic pain who presents today for management of hypothyroidism.     Patient is requesting new labs to check her cholesterol, sugar levels, anemia and hypothyroidism. Labs were last checked on March, however patient still wants to repeat.   Patient reports to be annoyed by her weight. She has tried to eat more healthy, but has a hard time with portions and exercises.     Education was provided about nutritional facts, calorie deficit diet and regular exercise.     Patient seems motivated to lose weight with lifestyle modifications             Review of Systems   Constitutional:  Negative for fatigue, fever and unexpected weight change.   HENT:  Negative for congestion.    Respiratory:   "Negative for shortness of breath, wheezing and stridor.    Cardiovascular:  Negative for chest pain, palpitations and leg swelling.   Gastrointestinal:  Negative for abdominal pain.   Genitourinary:  Negative for difficulty urinating.   Skin:  Negative for rash.   Neurological:  Negative for headaches.           Objective     /78 (BP Location: Right arm, Patient Position: Sitting, Cuff Size: Standard)   Pulse 96   Temp 97.5 °F (36.4 °C) (Temporal)   Resp 16   Ht 5' 2\" (1.575 m)   Wt 81.6 kg (180 lb)   SpO2 97%   BMI 32.92 kg/m²     Physical Exam  Constitutional:       General: She is not in acute distress.     Appearance: She is obese.   HENT:      Right Ear: External ear normal.      Left Ear: External ear normal.      Nose: Nose normal.   Eyes:      General: No scleral icterus.     Conjunctiva/sclera: Conjunctivae normal.   Cardiovascular:      Rate and Rhythm: Normal rate and regular rhythm.      Pulses: Normal pulses.      Heart sounds: Normal heart sounds. No murmur heard.     No gallop.   Pulmonary:      Effort: Pulmonary effort is normal. No respiratory distress.      Breath sounds: No wheezing, rhonchi or rales.   Abdominal:      General: Bowel sounds are normal. There is no distension.      Palpations: Abdomen is soft. There is no mass.      Tenderness: There is no abdominal tenderness. There is no guarding.   Musculoskeletal:      Cervical back: No rigidity or tenderness.      Right lower leg: No edema.      Left lower leg: No edema.   Lymphadenopathy:      Cervical: No cervical adenopathy.   Skin:     General: Skin is warm.      Capillary Refill: Capillary refill takes less than 2 seconds.   Neurological:      General: No focal deficit present.      Mental Status: She is alert and oriented to person, place, and time.   Psychiatric:         Mood and Affect: Mood normal.         Behavior: Behavior normal.         "

## 2024-10-16 ENCOUNTER — OFFICE VISIT (OUTPATIENT)
Dept: FAMILY MEDICINE CLINIC | Facility: CLINIC | Age: 49
End: 2024-10-16

## 2024-10-16 VITALS
SYSTOLIC BLOOD PRESSURE: 120 MMHG | BODY MASS INDEX: 33.13 KG/M2 | WEIGHT: 180 LBS | HEIGHT: 62 IN | HEART RATE: 96 BPM | RESPIRATION RATE: 16 BRPM | DIASTOLIC BLOOD PRESSURE: 78 MMHG | TEMPERATURE: 97.5 F | OXYGEN SATURATION: 97 %

## 2024-10-16 DIAGNOSIS — E03.8 OTHER SPECIFIED HYPOTHYROIDISM: Primary | ICD-10-CM

## 2024-10-16 DIAGNOSIS — E66.09 CLASS 1 OBESITY DUE TO EXCESS CALORIES WITHOUT SERIOUS COMORBIDITY WITH BODY MASS INDEX (BMI) OF 32.0 TO 32.9 IN ADULT: ICD-10-CM

## 2024-10-16 DIAGNOSIS — E66.811 CLASS 1 OBESITY DUE TO EXCESS CALORIES WITHOUT SERIOUS COMORBIDITY WITH BODY MASS INDEX (BMI) OF 32.0 TO 32.9 IN ADULT: ICD-10-CM

## 2024-10-16 PROCEDURE — 99213 OFFICE O/P EST LOW 20 MIN: CPT | Performed by: FAMILY MEDICINE

## 2024-10-16 RX ORDER — LEVOTHYROXINE SODIUM 25 UG/1
25 TABLET ORAL
Qty: 90 TABLET | Refills: 0 | Status: SHIPPED | OUTPATIENT
Start: 2024-10-16 | End: 2025-01-14

## 2024-10-16 NOTE — ASSESSMENT & PLAN NOTE
Diet is well balanced, but portions are not well controlled   No regular exercise    Plan:  Education provided on calorie deficit diet and regular exercise as well as nutritional facts   Lab work ordered per patient request   Follow up in 1 month   Food diary to be brought on next visit     Orders:    CBC and differential; Future    Lipid panel; Future    Hemoglobin A1C; Future

## 2024-10-16 NOTE — ASSESSMENT & PLAN NOTE
Stable, asymptomatic  Home medication: Levothyroxine 25 mcg daily  Last TSH: 3/12/2024 within normal limits; TSH within range since 2021    Plan:  Continue current regimen  Patient is requesting to repeat TSH   Orders:    TSH, 3rd generation; Future    levothyroxine (Synthroid) 25 mcg tablet; Take 1 tablet (25 mcg total) by mouth daily in the early morning

## 2024-11-11 ENCOUNTER — APPOINTMENT (OUTPATIENT)
Dept: LAB | Facility: HOSPITAL | Age: 49
End: 2024-11-11

## 2024-11-11 DIAGNOSIS — E03.8 OTHER SPECIFIED HYPOTHYROIDISM: ICD-10-CM

## 2024-11-11 DIAGNOSIS — E66.811 CLASS 1 OBESITY DUE TO EXCESS CALORIES WITHOUT SERIOUS COMORBIDITY WITH BODY MASS INDEX (BMI) OF 32.0 TO 32.9 IN ADULT: ICD-10-CM

## 2024-11-11 DIAGNOSIS — E66.09 CLASS 1 OBESITY DUE TO EXCESS CALORIES WITHOUT SERIOUS COMORBIDITY WITH BODY MASS INDEX (BMI) OF 32.0 TO 32.9 IN ADULT: ICD-10-CM

## 2024-11-11 LAB
BASOPHILS # BLD AUTO: 0.06 THOUSANDS/ÂΜL (ref 0–0.1)
BASOPHILS NFR BLD AUTO: 1 % (ref 0–1)
CHOLEST SERPL-MCNC: 205 MG/DL
EOSINOPHIL # BLD AUTO: 0.05 THOUSAND/ÂΜL (ref 0–0.61)
EOSINOPHIL NFR BLD AUTO: 1 % (ref 0–6)
ERYTHROCYTE [DISTWIDTH] IN BLOOD BY AUTOMATED COUNT: 12 % (ref 11.6–15.1)
EST. AVERAGE GLUCOSE BLD GHB EST-MCNC: 137 MG/DL
HBA1C MFR BLD: 6.4 %
HCT VFR BLD AUTO: 39.7 % (ref 34.8–46.1)
HDLC SERPL-MCNC: 69 MG/DL
HGB BLD-MCNC: 13.3 G/DL (ref 11.5–15.4)
IMM GRANULOCYTES # BLD AUTO: 0.01 THOUSAND/UL (ref 0–0.2)
IMM GRANULOCYTES NFR BLD AUTO: 0 % (ref 0–2)
LDLC SERPL CALC-MCNC: 116 MG/DL (ref 0–100)
LYMPHOCYTES # BLD AUTO: 2.07 THOUSANDS/ÂΜL (ref 0.6–4.47)
LYMPHOCYTES NFR BLD AUTO: 34 % (ref 14–44)
MCH RBC QN AUTO: 33.4 PG (ref 26.8–34.3)
MCHC RBC AUTO-ENTMCNC: 33.5 G/DL (ref 31.4–37.4)
MCV RBC AUTO: 100 FL (ref 82–98)
MONOCYTES # BLD AUTO: 0.34 THOUSAND/ÂΜL (ref 0.17–1.22)
MONOCYTES NFR BLD AUTO: 6 % (ref 4–12)
NEUTROPHILS # BLD AUTO: 3.6 THOUSANDS/ÂΜL (ref 1.85–7.62)
NEUTS SEG NFR BLD AUTO: 58 % (ref 43–75)
NONHDLC SERPL-MCNC: 136 MG/DL
NRBC BLD AUTO-RTO: 0 /100 WBCS
PLATELET # BLD AUTO: 341 THOUSANDS/UL (ref 149–390)
PMV BLD AUTO: 9.5 FL (ref 8.9–12.7)
RBC # BLD AUTO: 3.98 MILLION/UL (ref 3.81–5.12)
TRIGL SERPL-MCNC: 101 MG/DL
WBC # BLD AUTO: 6.13 THOUSAND/UL (ref 4.31–10.16)

## 2024-11-11 PROCEDURE — 83036 HEMOGLOBIN GLYCOSYLATED A1C: CPT

## 2024-11-11 PROCEDURE — 85025 COMPLETE CBC W/AUTO DIFF WBC: CPT

## 2024-11-11 PROCEDURE — 36415 COLL VENOUS BLD VENIPUNCTURE: CPT

## 2024-11-11 PROCEDURE — 80061 LIPID PANEL: CPT

## 2024-11-14 ENCOUNTER — OFFICE VISIT (OUTPATIENT)
Dept: FAMILY MEDICINE CLINIC | Facility: CLINIC | Age: 49
End: 2024-11-14

## 2024-11-14 VITALS
HEART RATE: 76 BPM | TEMPERATURE: 98.1 F | DIASTOLIC BLOOD PRESSURE: 81 MMHG | HEIGHT: 62 IN | OXYGEN SATURATION: 98 % | RESPIRATION RATE: 18 BRPM | WEIGHT: 183 LBS | BODY MASS INDEX: 33.68 KG/M2 | SYSTOLIC BLOOD PRESSURE: 122 MMHG

## 2024-11-14 DIAGNOSIS — E78.2 MODERATE MIXED HYPERLIPIDEMIA NOT REQUIRING STATIN THERAPY: ICD-10-CM

## 2024-11-14 DIAGNOSIS — R73.03 PREDIABETES: Primary | ICD-10-CM

## 2024-11-14 PROBLEM — E78.5 HYPERLIPIDEMIA: Status: ACTIVE | Noted: 2024-11-14

## 2024-11-14 NOTE — ASSESSMENT & PLAN NOTE
Likely due to poor diet and lack of exercise  ASCVD 10-year risk: 0.8%  Patient is motivates to start lifestyle modifications '

## 2024-11-14 NOTE — PROGRESS NOTES
Name: Mary Oviedo      : 1975      MRN: [id number]  Encounter Provider: Aly Sherman MD  Encounter Date: 2024   Encounter department: [facility]  :  Assessment & Plan  Prediabetes  Patient advised on lifestyle modifications   Will monitor A1c            Moderate mixed hyperlipidemia not requiring statin therapy  Likely due to poor diet and lack of exercise  ASCVD 10-year risk: 0.8%  Patient is motivates to start lifestyle modifications '                  History of Present Illness     Mary is a 49 y.o female with PMH hypothyroidism and chronic pain who presents today to discuss recent labs. Prediabetes and cholesterol levels were discussed with patient and all questions answered to patient satisfaction.     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 She also feels like she does not have much time to exercise as she also works cleaning houses. However after these recent labs she states that she will prioritize her health and starts to introduce small work outs during her week and work with her family on meal prep and light home cooked meals at night.     Patient reports to feel well overall and has no complaints at this time.           Review of Systems   Constitutional:  Negative for fatigue, fever and unexpected weight change.   HENT:  Negative for congestion and rhinorrhea.    Eyes:  Negative for visual disturbance.   Respiratory:  Negative for shortness of breath.    Cardiovascular:  Negative for chest pain and palpitations.   Gastrointestinal:  Negative for abdominal pain.   Endocrine: Negative for cold intolerance, heat intolerance, polydipsia, polyphagia and polyuria.   Skin:  Negative for rash.   Psychiatric/Behavioral:  Negative for sleep disturbance. The patient is not nervous/anxious.           Objective   /81 (BP Location: Left arm, Patient Position: Sitting, Cuff Size: Large)   Pulse 76   Temp  "98.1 °F (36.7 °C) (Temporal)   Resp 18   Ht 5' 2\" (1.575 m)   Wt 83 kg (183 lb)   LMP  (LMP Unknown)   SpO2 98%   BMI 33.47 kg/m²      Physical Exam  Vitals reviewed.   Constitutional:       General: She is not in acute distress.     Appearance: She is well-developed.   HENT:      Head: Normocephalic and atraumatic.      Right Ear: External ear normal.      Left Ear: External ear normal.      Nose: Nose normal.   Eyes:      General: No scleral icterus.     Conjunctiva/sclera: Conjunctivae normal.   Cardiovascular:      Rate and Rhythm: Normal rate and regular rhythm.      Pulses: Normal pulses.      Heart sounds: Normal heart sounds. No murmur heard.  Pulmonary:      Effort: Pulmonary effort is normal. No respiratory distress.      Breath sounds: Normal breath sounds. No wheezing.   Abdominal:      General: There is no distension.      Palpations: Abdomen is soft.      Tenderness: There is no abdominal tenderness. There is no guarding.   Musculoskeletal:      Cervical back: Neck supple.      Right lower leg: No edema.      Left lower leg: No edema.   Skin:     General: Skin is warm and dry.      Capillary Refill: Capillary refill takes less than 2 seconds.   Neurological:      General: No focal deficit present.      Mental Status: She is alert and oriented to person, place, and time.   Psychiatric:         Mood and Affect: Mood normal.         Behavior: Behavior normal.         "

## 2025-05-21 ENCOUNTER — ANNUAL EXAM (OUTPATIENT)
Dept: OBGYN CLINIC | Facility: CLINIC | Age: 50
End: 2025-05-21

## 2025-05-21 VITALS
BODY MASS INDEX: 32.68 KG/M2 | HEART RATE: 67 BPM | WEIGHT: 177.6 LBS | DIASTOLIC BLOOD PRESSURE: 75 MMHG | SYSTOLIC BLOOD PRESSURE: 111 MMHG | HEIGHT: 62 IN

## 2025-05-21 DIAGNOSIS — Z01.419 WOMEN'S ANNUAL ROUTINE GYNECOLOGICAL EXAMINATION: Primary | ICD-10-CM

## 2025-05-21 DIAGNOSIS — Z12.31 ENCOUNTER FOR SCREENING MAMMOGRAM FOR MALIGNANT NEOPLASM OF BREAST: ICD-10-CM

## 2025-05-21 DIAGNOSIS — N64.4 BREAST PAIN, LEFT: ICD-10-CM

## 2025-05-21 DIAGNOSIS — Z12.4 CERVICAL CANCER SCREENING: ICD-10-CM

## 2025-05-21 NOTE — PROGRESS NOTES
"ANNUAL GYNECOLOGICAL EXAMINATION    Mary Oviedo is a 49 y.o. female who presents today for annual GYN exam.  Her last pap smear was performed 3/2021 and result was NILM, HR-HPV negative.  She reports no history of abnormal pap smears in her past.  Her last mammogram was performed 2024 and result was BIRADS 1.  She had colonoscopy screening performed in  with recommendation to repeat in 10 years.  She had HIV screening performed  and it was negative.  She reports menses as absent for about 6 months, prior to this menses were every few months.  No LMP recorded (lmp unknown). Patient is postmenopausal.  She reports that for some time she has been having pain in the left breast. She feels this becomes worse when laying on this side or when her bra puts pressure on this area. Her general medical history has been reviewed and she reports it as follows:    Past Medical History[1]  Past Surgical History[2]  OB History          2    Para   2    Term   2            AB        Living   2         SAB        IAB        Ectopic        Multiple        Live Births   2               Social History[3]  Social History     Substance and Sexual Activity   Sexual Activity Not Currently    Partners: Male     Cancer-related family history is negative for Breast cancer, Colon cancer, and Ovarian cancer.    Current Outpatient Medications   Medication Instructions    cetirizine (ZYRTEC) 10 mg, Oral, Daily    levothyroxine (SYNTHROID) 25 mcg, Oral, Daily (early morning)       Review of Systems:  Review of Systems   Constitutional: Negative.    Gastrointestinal: Negative.    Genitourinary: Negative.    Skin: Negative.        Physical Exam:  /75 (BP Location: Right arm)   Pulse 67   Ht 5' 2\" (1.575 m)   Wt 80.6 kg (177 lb 9.6 oz)   LMP  (LMP Unknown)   BMI 32.48 kg/m²   Physical Exam  Constitutional:       General: She is not in acute distress.     Appearance: Normal appearance.   Genitourinary: "      Vulva and bladder normal.      No lesions in the vagina.      No vaginal erythema or ulceration.        Right Adnexa: not tender and no mass present.     Left Adnexa: not tender and no mass present.     No cervical motion tenderness or lesion.      Uterus is not enlarged or tender.      No uterine mass detected.  Breasts:     Right: No mass, nipple discharge or skin change.      Left: No mass, nipple discharge or skin change.     Cardiovascular:      Rate and Rhythm: Normal rate and regular rhythm.   Pulmonary:      Effort: Pulmonary effort is normal.      Breath sounds: Normal breath sounds.   Chest:     Abdominal:      General: Abdomen is flat.      Palpations: Abdomen is soft.     Musculoskeletal:      Cervical back: Neck supple.     Neurological:      Mental Status: She is alert.     Skin:     General: Skin is warm and dry.     Psychiatric:         Mood and Affect: Mood normal.         Behavior: Behavior normal.   Vitals reviewed.         Assessment/Plan:   1. Normal well-woman GYN exam.  2. Cervical cancer screening:  Normal cervical exam.  Pap smear up to date.    3. STD screening:  Patient declines.    4. Breast cancer screening:  Normal breast exam.  Order placed for bilateral diagnostic mammogram due to pain.  Reviewed breast self-awareness.   5. Colon cancer screening:  Up to date.    6. Depression Screening: Patient's depression screening was assessed with a PHQ-2 score of 0.Clinically patient does not have depression. No treatment is required.     7. BMI Counseling: Body mass index is 32.48 kg/m². Discussed the patient's BMI with her. The BMI is above normal. Exercise recommendations include exercising 3-5 times per week.   8. Reviewed perimenopause and monitoring menses. After 12 months with no menses will officially be considered menopausal.    9. Return to office in one year for annual exam or sooner if needed.    Reviewed with patient that [facility], but  that they will not necessarily be reviewed by me immediately.  Explained that I will review results at my earliest opportunity and contact patient appropriately.       [1]   Past Medical History:  Diagnosis Date    Carpal tunnel syndrome of left wrist 2022    Carpal tunnel syndrome on right 2021    Depression     Does not have health insurance 3/22/2021    Hypothyroid     No known health problems     Thyromegaly 2022 thyroid ultrasound:  Homogeneous thyroid.  No thyroid nodules. 2021 TSH: Normal Home medication:  Levothyroxine 25 mcg daily    Varicose veins of both lower extremities    [2]   Past Surgical History:  Procedure Laterality Date     SECTION      FL INJECTION RIGHT HIP (NON ARTHROGRAM)  02/10/2021    HERNIA REPAIR      OVARIAN CYST SURGERY      at 14 yo   [3]   Social History  Tobacco Use    Smoking status: Never     Passive exposure: Never    Smokeless tobacco: Never   Vaping Use    Vaping status: Never Used   Substance Use Topics    Alcohol use: No    Drug use: Never